# Patient Record
Sex: FEMALE | Race: WHITE | NOT HISPANIC OR LATINO | Employment: UNEMPLOYED | URBAN - METROPOLITAN AREA
[De-identification: names, ages, dates, MRNs, and addresses within clinical notes are randomized per-mention and may not be internally consistent; named-entity substitution may affect disease eponyms.]

---

## 2017-01-30 DIAGNOSIS — E66.09 NON MORBID OBESITY DUE TO EXCESS CALORIES: Primary | ICD-10-CM

## 2017-01-30 RX ORDER — PHENTERMINE HYDROCHLORIDE 37.5 MG/1
37.5 CAPSULE ORAL EVERY MORNING
Qty: 30 CAPSULE | Refills: 0 | Status: SHIPPED | OUTPATIENT
Start: 2017-01-30 | End: 2017-11-06

## 2017-01-30 NOTE — TELEPHONE ENCOUNTER
Phentermine     Last Written Prescription Date:  12-30-16  Last Fill Quantity: 30,   # refills: 0  Last Office Visit with [facility], [facility] or  Health prescribing provider: 12-30-16  Future Office visit:       Routing refill request to provider for review/approval because:  Drug not on the [facility], [facility] or  [facility] refill protocol or controlled substance

## 2017-01-30 NOTE — TELEPHONE ENCOUNTER
Per my note on 12/30/16, ok for refills x1. This is the first refill    Rx signed and placed in MA task.     [facility]

## 2017-02-08 ENCOUNTER — OFFICE VISIT (OUTPATIENT)
Dept: URGENT CARE | Facility: RETAIL CLINIC | Age: 42
End: 2017-02-08
Payer: COMMERCIAL

## 2017-02-08 VITALS
TEMPERATURE: 97.8 F | SYSTOLIC BLOOD PRESSURE: 126 MMHG | WEIGHT: 232 LBS | BODY MASS INDEX: 36.33 KG/M2 | DIASTOLIC BLOOD PRESSURE: 89 MMHG | HEART RATE: 88 BPM

## 2017-02-08 DIAGNOSIS — J06.9 ACUTE URI: ICD-10-CM

## 2017-02-08 DIAGNOSIS — J02.9 ACUTE PHARYNGITIS, UNSPECIFIED ETIOLOGY: Primary | ICD-10-CM

## 2017-02-08 LAB — S PYO AG THROAT QL IA.RAPID: NEGATIVE

## 2017-02-08 PROCEDURE — 87081 CULTURE SCREEN ONLY: CPT | Performed by: PHYSICIAN ASSISTANT

## 2017-02-08 PROCEDURE — 87880 STREP A ASSAY W/OPTIC: CPT | Mod: QW | Performed by: PHYSICIAN ASSISTANT

## 2017-02-08 PROCEDURE — 99213 OFFICE O/P EST LOW 20 MIN: CPT | Performed by: PHYSICIAN ASSISTANT

## 2017-02-08 ASSESSMENT — PAIN SCALES - GENERAL: PAINLEVEL: MILD PAIN (3)

## 2017-02-08 NOTE — MR AVS SNAPSHOT
"              After Visit Summary   2/8/2017    Andree Kennedy    MRN: [id number]           Patient Information     Date Of Birth          1975        Visit Information        Provider Department      2/8/2017 1:10 PM Flor Romero PA-C [facility]        Today's Diagnoses     Acute pharyngitis, unspecified etiology    -  1       Care Instructions    For sore throat  Rapid strep test today is negative.   Your throat culture is pending. [facility] will call you if positive results to start antibiotics at that time.  No phone call if strep culture is negative  Symptomatic treat with fluids, rest, salt water gargles, lozenges, and over the counter pain reliever, such as tylenol or ibuprofen as needed.   Please follow up with primary care provider if not improving, worsening or new symptoms     For ear and nasal congestion  Sudafed (decongestant) for congestion.  Apply warm compresses/packs for 15 minutes daily.  Steam treatments or humidifier.  Tylenol or ibuprofen as needed for pain or fever  Please follow up with primary care provider if not improving, worsening or new symptoms         Follow-ups after your visit        Who to contact     You can reach your care team any time of the day by calling [phone].  Notification of test results:  If you have an abnormal lab result, we will notify you by phone as soon as possible.         Additional Information About Your Visit        MyChart Information     A[employer]ek lets you send messages to your doctor, view your test results, renew your prescriptions, schedule appointments and more. To sign up, go to [email or web address] . Click on \"Log in\" on the left side of the screen, which will take you to the Welcome page. Then click on \"Sign up Now\" on the right side of the page.     You will be asked to enter the access code listed below, as well as some personal information. Please follow the directions to create your username and " password.     Your access code is: [email or web address]  Expires: 3/8/2017  1:34 PM     Your access code will  in 90 days. If you need help or a new code, please call your [place] clinic or [phone].        Care EveryWhere ID     This is your Care EveryWhere ID. This could be used by other organizations to access your [place] medical records  [id number]        Your Vitals Were     Pulse Temperature                88 97.8  F (36.6  C) (Temporal)           Blood Pressure from Last 3 Encounters:   17 126/89   16 122/76   16 141/100    Weight from Last 3 Encounters:   17 232 lb (105.235 kg)   16 224 lb (101.606 kg)   16 226 lb (102.513 kg)              We Performed the Following     BETA STREP GROUP A R/O CULTURE     RAPID STREP SCREEN        Primary Care Provider Office Phone # Fax #    Letty GILBERT Oneill PA-C [phone]       [facility] [address]  [facility] [address]        Thank you!     Thank you for choosing [facility]  for your care. Our goal is always to provide you with excellent care. Hearing back from our patients is one way we can continue to improve our services. Please take a few minutes to complete the written survey that you may receive in the mail after your visit with us. Thank you!             Your Updated Medication List - Protect others around you: Learn how to safely use, store and throw away your medicines at www.disposemymeds.org.          This list is accurate as of: 17  1:53 PM.  Always use your most recent med list.                   Brand Name Dispense Instructions for use    LATUDA PO      Take 40 mg by mouth daily       phentermine 37.5 MG capsule     30 capsule    Take 1 capsule (37.5 mg) by mouth every morning       XANAX PO      Take by mouth every 4 hours as needed for anxiety       ZOLOFT PO      Take 100 mg by mouth daily

## 2017-02-08 NOTE — PROGRESS NOTES
Chief Complaint   Patient presents with     Pharyngitis     for 3 days, feels tired     Ear Problem     left      SUBJECTIVE:  Andree Kennedy is a 41 year old female with a chief complaint of sore throat.  Onset of symptoms was 2 day(s) ago.    Course of illness: still present.  Severity moderate  Current and Associated symptoms: sore throat, ear pressure, tired, sinus pressure  Treatment measures tried include None tried.  Predisposing factors include had strep as a child    Past Medical History   Diagnosis Date     Papanicolaou smear of cervix with low grade squamous intraepithelial lesion (LGSIL) 11/3/15, 11/4/16     + HR HPV     Anxiety      Depressive disorder      Bipolar 1 disorder (H)      Current Outpatient Prescriptions   Medication Sig Dispense Refill     phentermine 37.5 MG capsule Take 1 capsule (37.5 mg) by mouth every morning 30 capsule 0     Lurasidone HCl (LATUDA PO) Take 40 mg by mouth daily        Sertraline HCl (ZOLOFT PO) Take 100 mg by mouth daily        ALPRAZolam (XANAX PO) Take by mouth every 4 hours as needed for anxiety        No Known Allergies     History   Smoking status     Former Smoker     Types: Cigarettes   Smokeless tobacco     Never Used       ROS:  Review of systems negative except as stated above.    OBJECTIVE:   /89 mmHg  Pulse 88  Temp(Src) 97.8  F (36.6  C) (Temporal)  Wt 232 lb (105.235 kg)  GENERAL APPEARANCE: healthy, alert and no distress  EYES: conjunctiva clear  HENT: ear canals clear. R TM gray. L TM gray with serous effusion. Nose boggy, pink.  Pharynx erythematous with no exudate noted.  NECK: supple, tender to palpation, small adenopathy noted  RESP: lungs clear to auscultation - no rales, rhonchi or wheezes  CV: regular rates and rhythm, normal S1 S2, no murmur noted  SKIN: no suspicious lesions or rashes    Rapid Strep test is negative; await throat culture results.    ASSESSMENT:     Acute pharyngitis, unspecified etiology  Acute URI    PLAN:   Rapid  strep test today is negative.   Your throat culture is pending. [facility] will call you if positive results to start antibiotics at that time.  No phone call if strep culture is negative  Symptomatic treat with fluids, rest, salt water gargles, lozenges, and over the counter pain reliever, such as tylenol or ibuprofen as needed.   Decongestant and warm compresses  Please follow up with primary care provider if not improving, worsening or new symptoms    Flor Romero PA-C  [facility]

## 2017-02-09 ENCOUNTER — OFFICE VISIT (OUTPATIENT)
Dept: OBGYN | Facility: OTHER | Age: 42
End: 2017-02-09
Payer: COMMERCIAL

## 2017-02-09 VITALS
HEART RATE: 72 BPM | SYSTOLIC BLOOD PRESSURE: 132 MMHG | BODY MASS INDEX: 36.21 KG/M2 | WEIGHT: 231.25 LBS | DIASTOLIC BLOOD PRESSURE: 84 MMHG

## 2017-02-09 DIAGNOSIS — N89.8 VAGINAL ODOR: Primary | ICD-10-CM

## 2017-02-09 LAB
MICRO REPORT STATUS: ABNORMAL
SPECIMEN SOURCE: ABNORMAL
WET PREP SPEC: ABNORMAL

## 2017-02-09 PROCEDURE — 86780 TREPONEMA PALLIDUM: CPT | Performed by: ADVANCED PRACTICE MIDWIFE

## 2017-02-09 PROCEDURE — 87389 HIV-1 AG W/HIV-1&-2 AB AG IA: CPT | Performed by: ADVANCED PRACTICE MIDWIFE

## 2017-02-09 PROCEDURE — 87591 N.GONORRHOEAE DNA AMP PROB: CPT | Performed by: ADVANCED PRACTICE MIDWIFE

## 2017-02-09 PROCEDURE — 87210 SMEAR WET MOUNT SALINE/INK: CPT | Performed by: ADVANCED PRACTICE MIDWIFE

## 2017-02-09 PROCEDURE — 87491 CHLMYD TRACH DNA AMP PROBE: CPT | Performed by: ADVANCED PRACTICE MIDWIFE

## 2017-02-09 PROCEDURE — 99213 OFFICE O/P EST LOW 20 MIN: CPT | Performed by: ADVANCED PRACTICE MIDWIFE

## 2017-02-09 PROCEDURE — 36415 COLL VENOUS BLD VENIPUNCTURE: CPT | Performed by: ADVANCED PRACTICE MIDWIFE

## 2017-02-09 RX ORDER — METRONIDAZOLE 7.5 MG/G
1 GEL VAGINAL AT BEDTIME
Qty: 70 G | Refills: 3 | Status: SHIPPED | OUTPATIENT
Start: 2017-02-09 | End: 2017-02-14

## 2017-02-09 NOTE — PROGRESS NOTES
"Andree Kennedy is a 41 year old who presents to the clinic for evaluation of vaginal odor and burning.   Feels that she has had probably 3 vaginal infections in the last 4-5 months.  She is also concerned about STI.   Reports that she and her  \"took a break\" a while ago and would like to be screened.      Histories reviewed and updated  Past Medical History   Diagnosis Date     Papanicolaou smear of cervix with low grade squamous intraepithelial lesion (LGSIL) 11/3/15, 11/4/16     + HR HPV     Anxiety      Depressive disorder      Bipolar 1 disorder (H)      Past Surgical History   Procedure Laterality Date     No history of surgery       Social History     Social History     Marital Status: Single     Spouse Name: N/A     Number of Children: N/A     Years of Education: N/A     Occupational History           customer service     Social History Main Topics     Smoking status: Former Smoker     Types: Cigarettes     Smokeless tobacco: Never Used     Alcohol Use: Yes      Comment: social     Drug Use: No     Sexual Activity:     Partners: Male     Birth Control/ Protection: None     Other Topics Concern     Parent/Sibling W/ Cabg, Mi Or Angioplasty Before 65f 55m? No     Social History Narrative     Family History   Problem Relation Age of Onset     Cancer - colorectal Mother      polyp removal     DIABETES Father      CANCER Father      Skin     Hypertension Father      HEART DISEASE Father      Unknown/Adopted Maternal Grandmother      Unknown/Adopted Maternal Grandfather      Unknown/Adopted Paternal Grandmother      Unknown/Adopted Paternal Grandfather            CONSTITUTIONAL: Healthy, alert, a bit weepy on and off through the visit  : vaginal burning on and off may be related to intercourse.   PSYCHIATRIC: has a therapist and psychiatist    EXAM:  /84 mmHg  Pulse 72  Wt 231 lb 4 oz (104.894 kg)  LMP 02/04/2017 (Approximate)    : PELVIC EXAM:  Vulva: No external lesions, normal hair " distribution, no adenopathy, BUS WNL  Vagina: Moist, pink, no abnormal discharge, well rugated, no lesions.  Ph is >4.5  Odor is noted.   Cervix: smooth, pink, no visible lesions, Rectal exam: deferred    Wet prep with clue    ASSESSMENT/PLAN:    (N89.8) Vaginal odor  (primary encounter diagnosis)  Comment:   Plan: Wet prep, Anti Treponema, Chlamydia trachomatis        PCR, Neisseria gonorrhoeae PCR, HIV Antigen         Antibody Combo            Will plan to place on suppression.    Use metrogel x 5 nights then once a week for 3-6 months.   Will contact me if this fails or she has other issues.

## 2017-02-09 NOTE — NURSING NOTE
"Chief Complaint   Patient presents with     Vaginal Problem     check for vaginal infection       Initial /84 mmHg  Pulse 72  Wt 231 lb 4 oz (104.894 kg)  LMP 2017 (Approximate) Estimated body mass index is 36.21 kg/(m^2) as calculated from the following:    Height as of 16: 5' 7.01\" (1.702 m).    Weight as of this encounter: 231 lb 4 oz (104.894 kg).  BP completed using cuff size: large        The following HM Due: NONE      The following patient reported/Care Every where data was sent to:  P ABSTRACT QUALITY INITIATIVES [49779]       patient has appointment for today  [address] CMA                 "

## 2017-02-09 NOTE — MR AVS SNAPSHOT
After Visit Summary   2/9/2017    Andree Kennedy    MRN: [id number]           Patient Information     Date Of Birth          1975        Visit Information        Provider Department      2/9/2017 12:30 PM Arelis Melgar APRN [facility]        Today's Diagnoses     Vaginal odor    -  1       Care Instructions                          Bacterial Vaginosis (Vaginal Infection)  Information About Your Condition:  Description  Bacterial vaginosis (BV) is a common infection of the vagina caused by bacteria. Bacterial vaginosis needs to be treated because it increases your risk of becoming infected with HIV if you are exposed to the virus. If you also have a sexually transmitted infection, such as chlamydia, the risk that the infection will spread into the uterus is higher when you also have BV. The symptoms usually go away within a few days after you start treatment.   Symptoms  Many women do not have any symptoms. If symptoms are present, they may include one or more of the following:  a fishy smelling, gray or yellowish discharge from the vagina, especially after sexual intercourse   itching or irritation around the opening of the vagina   pain during urination   Causes  Bacterial vaginosis appears to be caused by an overgrowth of several types of bacteria. It is normal to have these bacteria in the vagina. However, when something upsets the balance between normal and harmful bacteria in the vagina, it can cause unpleasant symptoms.   It is not known what causes the overgrowth of harmful bacteria. Most cases of BV occur in sexually active women. Women who have more than one sexual partner or who have a woman as a partner have a greater risk of developing the problem. However, women who are not sexually active can also have BV.   Douching or using an IUD (intrauterine device) for birth control may increase your risk.   What You Should Do At Home (Follow-up Care)   If you were  given a prescription be sure to get it filled right away. Follow the directions exactly. Take the medicine until it is completely gone. Do not stop taking it just because you feel better. If you do not think it is helping, call your healthcare provider. Do not increase how much you take or how often you take it without talking to your healthcare provider first.   If there is a possibility that you may be pregnant, tell your healthcare provider. Do NOT take metronidazole (Flagyl  or MetroGel ). It should not be used during the first 3 months of pregnancy. It can be used AFTER the first 3 months of pregnancy if it is clearly needed.   If you are taking metronidazole (Flagyl  or MetroGel ), do not drink any alcohol until 2 days after you finish the medicine. Drinking alcohol while you are taking Flagyl  may cause severe nausea and vomiting.   If you have sexual intercourse while you are taking the medicine, make sure a latex or polyurethane condom is used so you do not become reinfected.   Please keep all medicines out of the reach of children.   What You Can Do To Stay Healthy   Don't have sex.   If you have sexual intercourse, have only one partner who has no other partners.   Do not douche.   Protect yourself with a condom every time you have vaginal, anal, or oral sex.   Care Alerts  Call Your Healthcare Provider Right Away If:  You develop new or worsening pain.   You have new abnormal vaginal bleeding   Your symptoms don t improve or they get worse, or new symptoms develop.   Your symptoms last more than 1 week.   You have symptoms that worry you.           Follow-ups after your visit        Who to contact     If you have questions or need follow up information about today's clinic visit or your schedule please contact [facility] directly at [phone].  Normal or non-critical lab and imaging results will be communicated to you by MyChart, letter or phone within 4 business days after the clinic  "has received the results. If you do not hear from us within 7 days, please contact the clinic through Guanri or phone. If you have a critical or abnormal lab result, we will notify you by phone as soon as possible.  Submit refill requests through Guanri or call your pharmacy and they will forward the refill request to us. Please allow 3 business days for your refill to be completed.          Additional Information About Your Visit        ProviationharMobileHelp Information     Guanri lets you send messages to your doctor, view your test results, renew your prescriptions, schedule appointments and more. To sign up, go to [email or web address] . Click on \"Log in\" on the left side of the screen, which will take you to the Welcome page. Then click on \"Sign up Now\" on the right side of the page.     You will be asked to enter the access code listed below, as well as some personal information. Please follow the directions to create your username and password.     Your access code is: [email or web address]  Expires: 3/8/2017  1:34 PM     Your access code will  in 90 days. If you need help or a new code, please call your [place] clinic or [phone].        Care EveryWhere ID     This is your Care EveryWhere ID. This could be used by other organizations to access your [place] medical records  [id number]        Your Vitals Were     Pulse Last Period                72 2017 (Approximate)           Blood Pressure from Last 3 Encounters:   17 132/84   17 126/89   16 122/76    Weight from Last 3 Encounters:   17 231 lb 4 oz (104.894 kg)   17 232 lb (105.235 kg)   16 224 lb (101.606 kg)              We Performed the Following     Anti Treponema     Chlamydia trachomatis PCR     HIV Antigen Antibody Combo     Neisseria gonorrhoeae PCR     Wet prep          Today's Medication Changes          These changes are accurate as of: 17  1:07 PM.  If you have any questions, ask your nurse or doctor.    "            Start taking these medicines.        Dose/Directions    metroNIDAZOLE 0.75 % vaginal gel   Commonly known as:  METROGEL   Used for:  Vaginal odor   Started by:  Arelis Melgar APRN CNM        Dose:  1 applicator   Place 1 applicator vaginally At Bedtime for 5 days   Quantity:  70 g   Refills:  3            Where to get your medicines      These medications were sent to [facility] [address] [address] [address]  [address] [facility] [address]     Phone:  [phone]    - metroNIDAZOLE 0.75 % vaginal gel             Primary Care Provider Office Phone # Fax #    Letty GILBERT Oneill PA-C [phone]       [facility] [address]  [facility] [address]        Thank you!     Thank you for choosing [facility]  for your care. Our goal is always to provide you with excellent care. Hearing back from our patients is one way we can continue to improve our services. Please take a few minutes to complete the written survey that you may receive in the mail after your visit with us. Thank you!             Your Updated Medication List - Protect others around you: Learn how to safely use, store and throw away your medicines at www.disposemymeds.org.          This list is accurate as of: 2/9/17  1:07 PM.  Always use your most recent med list.                   Brand Name Dispense Instructions for use    LATUDA PO      Take 40 mg by mouth daily       metroNIDAZOLE 0.75 % vaginal gel    METROGEL    70 g    Place 1 applicator vaginally At Bedtime for 5 days       phentermine 37.5 MG capsule     30 capsule    Take 1 capsule (37.5 mg) by mouth every morning       XANAX PO      Take by mouth every 4 hours as needed for anxiety       ZOLOFT PO      Take 100 mg by mouth daily

## 2017-02-10 LAB
BETA STREP CONFIRM: NORMAL
C TRACH DNA SPEC QL NAA+PROBE: NORMAL
HIV 1+2 AB+HIV1 P24 AG SERPL QL IA: NORMAL
N GONORRHOEA DNA SPEC QL NAA+PROBE: NORMAL
SPECIMEN SOURCE: NORMAL
SPECIMEN SOURCE: NORMAL
T PALLIDUM IGG+IGM SER QL: NEGATIVE

## 2017-09-29 ENCOUNTER — ALLIED HEALTH/NURSE VISIT (OUTPATIENT)
Dept: FAMILY MEDICINE | Facility: OTHER | Age: 42
End: 2017-09-29
Payer: COMMERCIAL

## 2017-09-29 DIAGNOSIS — Z23 NEED FOR PROPHYLACTIC VACCINATION AND INOCULATION AGAINST INFLUENZA: Primary | ICD-10-CM

## 2017-09-29 PROCEDURE — 90686 IIV4 VACC NO PRSV 0.5 ML IM: CPT

## 2017-09-29 PROCEDURE — 99207 ZZC NO CHARGE NURSE ONLY: CPT

## 2017-09-29 PROCEDURE — 90471 IMMUNIZATION ADMIN: CPT

## 2017-09-29 NOTE — MR AVS SNAPSHOT
"              After Visit Summary   9/29/2017    Andree Kennedy    MRN: [id number]           Patient Information     Date Of Birth          1975        Visit Information        Provider Department      9/29/2017 4:00 PM NL FLU SHOT ERC [facility]        Today's Diagnoses     Need for prophylactic vaccination and inoculation against influenza    -  1       Follow-ups after your visit        Your next 10 appointments already scheduled     Sep 29, 2017  4:00 PM CDT   Nurse Only with NL FLU SHOT ERC   [facility]    [address]  [facility] [id number] [phone]              Who to contact     If you have questions or need follow up information about today's clinic visit or your schedule please contact [facility] directly at [phone].  Normal or non-critical lab and imaging results will be communicated to you by BurudaConcerthart, letter or phone within 4 business days after the clinic has received the results. If you do not hear from us within 7 days, please contact the clinic through MyChart or phone. If you have a critical or abnormal lab result, we will notify you by phone as soon as possible.  Submit refill requests through [employer] or call your pharmacy and they will forward the refill request to us. Please allow 3 business days for your refill to be completed.          Additional Information About Your Visit        MyChart Information     [employer] lets you send messages to your doctor, view your test results, renew your prescriptions, schedule appointments and more. To sign up, go to [email or web address] . Click on \"Log in\" on the left side of the screen, which will take you to the Welcome page. Then click on \"Sign up Now\" on the right side of the page.     You will be asked to enter the access code listed below, as well as some personal information. Please follow the directions to create your username and password.   "   Your access code is: [email or web address]  Expires: 2017  2:41 PM     Your access code will  in 90 days. If you need help or a new code, please call your [place] clinic or [phone].        Care EveryWhere ID     This is your Care EveryWhere ID. This could be used by other organizations to access your [place] medical records  [id number]         Blood Pressure from Last 3 Encounters:   17 132/84   17 126/89   16 122/76    Weight from Last 3 Encounters:   17 231 lb 4 oz (104.9 kg)   17 232 lb (105.2 kg)   16 224 lb (101.6 kg)              We Performed the Following     FLU VAC, SPLIT VIRUS IM > 3 YO (QUADRIVALENT) [88625]     Vaccine Administration, Initial [73849]        Primary Care Provider Office Phone # Fax #    Letty GILBERT Oneill PA-C [phone]       [address] [place] [address] [address]  [facility] [address]        Equal Access to Services     [facility]: Hadii aad ku hadasho Soomaali, waaxda luqadaha, qaybta kaalmada shawna, ana avila . So [facility] [phone].    ATENCIÓN: Si habla español, tiene a rivers disposición servicios gratuitos de asistencia lingüística. [facility] [phone].    We comply with applicable federal civil rights laws and [place] laws. We do not discriminate on the basis of race, color, national origin, age, disability, sex, sexual orientation, or gender identity.            Thank you!     Thank you for choosing [facility]  for your care. Our goal is always to provide you with excellent care. Hearing back from our patients is one way we can continue to improve our services. Please take a few minutes to complete the written survey that you may receive in the mail after your visit with us. Thank you!             Your Updated Medication List - Protect others around you: Learn how to safely use, store and throw away your medicines at www.disposemymeds.org.          This list is accurate  as of: 9/29/17  2:41 PM.  Always use your most recent med list.                   Brand Name Dispense Instructions for use Diagnosis    LATUDA PO      Take 40 mg by mouth daily        phentermine 37.5 MG capsule     30 capsule    Take 1 capsule (37.5 mg) by mouth every morning    Non morbid obesity due to excess calories       XANAX PO      Take by mouth every 4 hours as needed for anxiety        ZOLOFT PO      Take 100 mg by mouth daily

## 2017-09-29 NOTE — PROGRESS NOTES
Injectable Influenza Immunization Documentation    1.  Is the person to be vaccinated sick today?   No    2. Does the person to be vaccinated have an allergy to a component   of the vaccine?   No    3. Has the person to be vaccinated ever had a serious reaction   to influenza vaccine in the past?   No    4. Has the person to be vaccinated ever had Guillain-Barré syndrome?   No    Form completed by Andree Kennedy/Vandana Dumont, [facility]

## 2017-11-02 NOTE — PROGRESS NOTES
SUBJECTIVE:                                                    Andree Kennedy is a 42 year old female who presents to clinic today for the following health issues:    Pt states also having sore throat sx's and would like a throat swab  Throat pain started Friday, fatigue, feels like something is stuck in throat, stuffy  Acute Illness   Acute illness concerns: As above   Onset: Friday     Fever: no    Chills/Sweats: no    Headache (location?): no    Sinus Pressure:YES    Conjunctivitis:  no    Ear Pain: no    Rhinorrhea: no    Congestion: YES    Sore Throat: YES     Cough: no    Wheeze: no    Decreased Appetite: no    Nausea: no    Vomiting: no    Diarrhea:  no    Dysuria/Freq.: no    Fatigue/Achiness: YES    Sick/Strep Exposure: no     Therapies Tried and outcome: Increased fluids       History of Present Illness     Depression & Anxiety Follow-up:     Depression/Anxiety:  Depression & Anxiety    Status since last visit::  Worsened    Other associated symptoms of depression and anxiety::  None    Significant life event::  No    Current substance use::  None    Diet:  Other  Frequency of exercise:  2-3 days/week  Duration of exercise:  45-60 minutes  Taking medications regularly:  Yes  Medication side effects:  None  Additional concerns today:  No    - This time of year worsens, has seasonal affective   - Latuda 40 mg and Sertraline 100 mg, Xanax PRN - 2-3x/week   - Therapy every week, working on coping mechanisms   - Denies SI     PHQ-9 SCORE 9/12/2013 6/1/2016 11/6/2017   Total Score 10 - -   Total Score MyChart - - 25 (Severe depression)   Total Score - 25 25     MICA-7 SCORE 9/12/2013 6/1/2016   Total Score 11 -   Total Score - 21       Medication Followup of Phentermine    Taking Medication as prescribed: not taking currently    Side Effects:  None    Medication Helping Symptoms:  Yes    - Wondering about a medication called contrave   - Also wondering about bariatric surgery referral  - Phentermine helps but  "always comes back slowly after stopping   - Tried many diets and other medications in the past        Hypertension Follow-up      Outpatient blood pressures are not being checked.    Low Salt Diet: not monitoring salt      Answers for HPI/ROS submitted by the patient on 11/6/2017   PHQ-2 Score: 6  If you checked off any problems, how difficult have these problems made it for you to do your work, take care of things at home, or get along with other people?: Extremely difficult  PHQ9 TOTAL SCORE: 25        Plan from last visit 12/30/16  1.  - Restart Phentermine, reviewed use and side effects   - Can do for total of 12 weeks, ok for refills x2   - Continue with 1,500 calorie diet and get back on exercise      2.   - Will get wet prep today   - Results will be communicated to patient when available and appropriate medication changes made if necessary       The 10-year ASCVD risk score (Sita TILLMAN Jr, et al., 2013) is: 1.7%    Values used to calculate the score:      Age: 42 years      Sex: Female      Is Non- : No      Diabetic: No      Tobacco smoker: No      Systolic Blood Pressure: 128 mmHg      Is BP treated: No      HDL Cholesterol: 36 mg/dL      Total Cholesterol: 224 mg/dL      Problem list and histories reviewed & adjusted, as indicated.  Additional history: as documented    BP Readings from Last 3 Encounters:   11/06/17 (!) 138/96   02/09/17 132/84   02/08/17 126/89    Wt Readings from Last 3 Encounters:   11/06/17 241 lb (109.3 kg)   02/09/17 231 lb 4 oz (104.9 kg)   02/08/17 232 lb (105.2 kg)             Labs reviewed in EPIC      ROS:  Constitutional, HEENT, cardiovascular, pulmonary, gi and gu systems are negative, except as otherwise noted.      OBJECTIVE:   BP (!) 138/96 (BP Location: Right arm, Patient Position: Chair, Cuff Size: Adult Regular)  Pulse 74  Temp 98.1  F (36.7  C) (Oral)  Resp 16  Ht 5' 5.75\" (1.67 m)  Wt 241 lb (109.3 kg)  SpO2 98%  BMI 39.2 kg/m2  Body mass " index is 39.2 kg/(m^2).  GENERAL APPEARANCE: healthy, alert and no distress  EYES: Eyes grossly normal to inspection, PERRLA, conjunctivae and sclerae without injection or discharge, EOM intact   HENT: Bilateral ear canals without erythema or cerumen, bilateral TM's pearly grey with normal light reflex, no effusion, injection, or bulging, nasal turbinates without swelling, erythema, or discharge, mouth without ulcers or lesions, oropharynx mildly erythematous without edema, oral mucous membranes moist, no sinus tenderness   NECK: No adenopathy in cervical, supraclavicular, or axillary regions, no asymmetry, masses, or scars, and thyroid normal to palpation, no JVD   RESP: Lungs clear to auscultation - no rales, rhonchi or wheezes   CV: Regular rates and rhythm, normal S1 S2, no S3 or S4, no murmur, click or rub, no peripheral edema and peripheral pulses strong and symmetric bilaterally    MS: No musculoskeletal defects are noted and gait is age appropriate without ataxia   SKIN: No suspicious lesions or rashes, hydration status appears adeuqate with normal skin turgor   PSYCH: Alert and oriented x3; speech- coherent , normal rate and volume; able to articulate logical thoughts, able to abstract reason, no tangential thoughts, no hallucinations or delusions, mentation appears normal, Mood is euthymic. Affect is appropriate for this mood state and bright. Thought content is free of suicidal ideation, hallucinations, and delusions. Dress is adequate and upkept. Eye contact is good during conversation.         Diagnostic Test Results:  Results for orders placed or performed in visit on 11/06/17   BASIC METABOLIC PANEL   Result Value Ref Range    Sodium 139 133 - 144 mmol/L    Potassium 4.3 3.4 - 5.3 mmol/L    Chloride 107 94 - 109 mmol/L    Carbon Dioxide 26 20 - 32 mmol/L    Anion Gap 6 3 - 14 mmol/L    Glucose 84 70 - 99 mg/dL    Urea Nitrogen 8 7 - 30 mg/dL    Creatinine 0.72 0.52 - 1.04 mg/dL    GFR Estimate 89 >60  mL/min/1.7m2    GFR Estimate If Black >90 >60 mL/min/1.7m2    Calcium 8.5 8.5 - 10.1 mg/dL   Lipid panel reflex to direct LDL Fasting   Result Value Ref Range    Cholesterol 277 (H) <200 mg/dL    Triglycerides 258 (H) <150 mg/dL    HDL Cholesterol 39 (L) >49 mg/dL    LDL Cholesterol Calculated 186 (H) <100 mg/dL    Non HDL Cholesterol 238 (H) <130 mg/dL   TSH with free T4 reflex   Result Value Ref Range    TSH 1.57 0.40 - 4.00 mU/L   Strep, Rapid Screen   Result Value Ref Range    Specimen Description Throat     Rapid Strep A Screen       NEGATIVE: No Group A streptococcal antigen detected by immunoassay, await culture report.         ASSESSMENT/PLAN:       ICD-10-CM    1. HTN, goal below 140/90 I10 BASIC METABOLIC PANEL     BARIATRIC ADULT REFERRAL     TSH with free T4 reflex   2. Hyperlipidemia LDL goal <130 E78.5 Lipid panel reflex to direct LDL Fasting     BARIATRIC ADULT REFERRAL     TSH with free T4 reflex   3. Class 2 obesity due to excess calories without serious comorbidity with body mass index (BMI) of 39.0 to 39.9 in adult E66.09 BARIATRIC ADULT REFERRAL    Z68.39 TSH with free T4 reflex     naltrexone-bupropion (CONTRAVE) 8-90 MG per 12 hr tablet   4. Anxiety state F41.1 naltrexone-bupropion (CONTRAVE) 8-90 MG per 12 hr tablet   5. Mild episode of recurrent major depressive disorder (H) F33.0 naltrexone-bupropion (CONTRAVE) 8-90 MG per 12 hr tablet   6. Bipolar I disorder (H) F31.9    7. Seasonal affective disorder (H) F33.9    8. Throat pain R07.0 Strep, Rapid Screen     Beta strep group A culture   9. Viral URI J06.9     B97.89      1. HTN   - Known diagnosis since 2013, controlled without medication   - Elevated today but did come down with rest   - Will get labs today to update BMP, as well as TSH to rule out reversible causes   - Results will be communicated to patient when available and appropriate medication changes made if necessary   - Given BP log to monitor at pharmacies, return to clinic if  >140/90 consistently   - Hand out on DASH diet given    2. Lipids   - Discussed new diagnosis as of labs from March 2016 and need for yearly rechecks   - Discussed risks of uncontrolled cholesterol   - Will update lab today  - Results will be communicated to patient when available and appropriate medication changes made if necessary   - Discussed ASCVD risk score and decision making for medication     3. Obesity   - Discussed risks of this including impact on her hypertension and hyperlipidemia   - Discussed alternative medications as well as bariatric surgery referral in detail   - Discussed I would prefer at this time to switch to a long term medication instead of short term phentermine   - Will start Contrave, discussed use and side effects including how could help with her mood, also discussed taper plan (see patient instructions)   - Recheck every 2-3 weeks to assist in taper  - Continue with diet and exercise, return to [id number] calorie diet, and increase frequency and intensity of exercise   - Patient to consider bariatric referral, this was given, discussed intensive and takes awhile before surgery happens so would be ok to pursue seeing if she is a candidate early     4-7. Mood   - Discussed bupropion in Contrave will help with mood as well as weight loss   - Recheck every 2-3 weeks   - Patient to monitor mood symptoms for improvements and worsening   - Continue with Latuda & Zoloft, as well as Xanax PRN, as prescribed by counseling/physciatry group  - Continue with weekly counseling     8 & 9. Acute illness   - Discussed no signs of infection on exam, negative rapid strep, will await culture   - Continue with OTC medications for symptomatic treatment as helpful   - Return to clinic if symptoms worsen or persist >10 days     The patient indicates understanding of these issues and agrees with the plan.    Follow up: As outlined above       A total of 50 minutes was spent with the patient today, with greater  than 50% of the visit involving counseling and coordination of care.      Letty Oneill PA-C  [facility]

## 2017-11-03 NOTE — PROGRESS NOTES
SUBJECTIVE:   CC: Andree Kennedy is an 42 year old woman who presents for preventive health visit.     Healthy Habits:    Do you get at least three servings of calcium containing foods daily (dairy, green leafy vegetables, etc.)? yes    Amount of exercise or daily activities, outside of work: 3 day(s) per week    Problems taking medications regularly No    Medication side effects: No    Have you had an eye exam in the past two years? yes    Do you see a dentist twice per year? yes    Do you have sleep apnea, excessive snoring or daytime drowsiness?no        No concerns    Today's PHQ-2 Score:   PHQ-2 ( 1999 [employer]) 11/7/2017 11/6/2017   Q1: Little interest or pleasure in doing things 3 3   Q2: Feeling down, depressed or hopeless 3 3   PHQ-2 Score 6 6   Q1: Little interest or pleasure in doing things - Nearly every day   Q2: Feeling down, depressed or hopeless - Nearly every day   PHQ-2 Score - 6         Abuse: Current or Past(Physical, Sexual or Emotional)- No  Do you feel safe in your environment - Yes  Social History   Substance Use Topics     Smoking status: Former Smoker     Types: Cigarettes     Smokeless tobacco: Never Used     Alcohol use Yes      Comment: social     The patient does not drink >3 drinks per day nor >7 drinks per week.    Reviewed orders with patient.  Reviewed health maintenance and updated orders accordingly - Yes  BP Readings from Last 3 Encounters:   11/07/17 132/80   11/06/17 128/88   02/09/17 132/84    Wt Readings from Last 3 Encounters:   11/07/17 242 lb 8 oz (110 kg)   11/06/17 241 lb (109.3 kg)   02/09/17 231 lb 4 oz (104.9 kg)                  Patient Active Problem List   Diagnosis     Anxiety state     Headache, post-traumatic     Neck pain     Headaches, tension     Cervical radiculopathy     Neural foraminal stenosis of cervical spine     Cervical spondylosis     Postpartum hypertension     HTN, goal below 140/90     Obesity     Papanicolaou smear of cervix with low grade  squamous intraepithelial lesion (LGSIL)     Hyperlipidemia LDL goal <130     Mild episode of recurrent major depressive disorder (H)     Bipolar I disorder (H)     Seasonal affective disorder (H)     Past Surgical History:   Procedure Laterality Date     NO HISTORY OF SURGERY         Social History   Substance Use Topics     Smoking status: Former Smoker     Types: Cigarettes     Smokeless tobacco: Never Used     Alcohol use Yes      Comment: social     Family History   Problem Relation Age of Onset     Cancer - colorectal Mother      polyp removal     DIABETES Father      CANCER Father      Skin     Hypertension Father      HEART DISEASE Father      Unknown/Adopted Maternal Grandmother      Unknown/Adopted Maternal Grandfather      Unknown/Adopted Paternal Grandmother      Unknown/Adopted Paternal Grandfather          Current Outpatient Prescriptions   Medication Sig Dispense Refill     Lurasidone HCl (LATUDA PO) Take 40 mg by mouth daily        Sertraline HCl (ZOLOFT PO) Take 100 mg by mouth daily        ALPRAZolam (XANAX PO) Take by mouth every 4 hours as needed for anxiety       naltrexone-bupropion (CONTRAVE) 8-90 MG per 12 hr tablet Take 1 tablet in the morning for 1 week, then increase to 1 tablet in morning and 1 in afternoon. (Patient not taking: Reported on 11/7/2017) 60 tablet 0           Patient under age 50, mutual decision reflected in health maintenance.        Pertinent mammograms are reviewed under the imaging tab.  History of abnormal Pap smear:   Last 3 Pap Results:   PAP (no units)   Date Value   11/04/2016 LSIL (A)   11/03/2015 LSIL (A)       Reviewed and updated as needed this visit by clinical staff         Reviewed and updated as needed this visit by Provider            ROS:  C: NEGATIVE for fever, chills, change in weight  I: NEGATIVE for worrisome rashes, moles or lesions  E: NEGATIVE for vision changes or irritation  ENT: NEGATIVE for ear, mouth and throat problems  R: NEGATIVE for  significant cough or SOB  B: NEGATIVE for masses, tenderness or discharge  CV: NEGATIVE for chest pain, palpitations or peripheral edema  GI: NEGATIVE for nausea, abdominal pain, heartburn, or change in bowel habits  : NEGATIVE for unusual urinary or vaginal symptoms. Periods are regular.  M: NEGATIVE for significant arthralgias or myalgia  N: NEGATIVE for weakness, dizziness or paresthesias  E: NEGATIVE for temperature intolerance, skin/hair changes  P: NEGATIVE for changes in mood or affect    OBJECTIVE:   There were no vitals taken for this visit.  EXAM:  GENERAL: healthy, alert and no distress  EYES: Eyes grossly normal to inspection, PERRL and conjunctivae and sclerae normal  HENT: ear canals and TM's normal, nose and mouth without ulcers or lesions  NECK: no adenopathy, no asymmetry, masses, or scars and thyroid normal to palpation  RESP: lungs clear to auscultation - no rales, rhonchi or wheezes  BREAST: normal without masses, tenderness or nipple discharge and no palpable axillary masses or adenopathy  CV: regular rate and rhythm, normal S1 S2, no S3 or S4, no murmur, click or rub, no peripheral edema and peripheral pulses strong  ABDOMEN: soft, nontender, no hepatosplenomegaly, no masses and bowel sounds normal   (female): normal female external genitalia, normal urethral meatus, vaginal mucosa pink, moist, well rugated, and normal cervix without masses or discharge  MS: no gross musculoskeletal defects noted, no edema  SKIN: no suspicious lesions or rashes  NEURO: Normal strength and tone, mentation intact and speech normal  PSYCH: mentation appears normal, affect normal/bright    ASSESSMENT/PLAN:   (Z01.419) Encounter for well woman exam with routine gynecological exam  (primary encounter diagnosis)  Comment:   Plan:     (N89.8) Vaginal odor  Comment:   Plan: Wet prep            (R87.612) Papanicolaou smear of cervix with low grade squamous intraepithelial lesion (LGSIL)  Comment:   Plan: Pap imaged  "thin layer diagnostic with HPV         (select HPV order below), HPV High Risk Types         DNA Cervical            (L29.8) Vaginal itching  Comment:   Plan: terconazole (TERAZOL 3) 0.8 % cream              COUNSELING:   Reviewed preventive health counseling, as reflected in patient instructions       Regular exercise       Healthy diet/nutrition       Contraception       Family planning    Continues to see therapist on a weekly basis     reports that she has quit smoking. Her smoking use included Cigarettes. She has never used smokeless tobacco.    Estimated body mass index is 36.21 kg/(m^2) as calculated from the following:    Height as of 12/8/16: 5' 7.01\" (1.702 m).    Weight as of 2/9/17: 231 lb 4 oz (104.9 kg).   Weight management plan: consdering bariatric surgery    Counseling Resources:  ATP IV Guidelines  Pooled Cohorts Equation Calculator  Breast Cancer Risk Calculator  FRAX Risk Assessment  ICSI Preventive Guidelines  Dietary Guidelines for Americans, 2010  USDA's MyPlate  ASA Prophylaxis  Lung CA Screening    Day FRAN Ochoa [facility]  "

## 2017-11-06 ENCOUNTER — TELEPHONE (OUTPATIENT)
Dept: FAMILY MEDICINE | Facility: OTHER | Age: 42
End: 2017-11-06

## 2017-11-06 ENCOUNTER — OFFICE VISIT (OUTPATIENT)
Dept: FAMILY MEDICINE | Facility: OTHER | Age: 42
End: 2017-11-06
Payer: MEDICARE

## 2017-11-06 VITALS
SYSTOLIC BLOOD PRESSURE: 128 MMHG | HEIGHT: 66 IN | WEIGHT: 241 LBS | TEMPERATURE: 98.1 F | OXYGEN SATURATION: 98 % | HEART RATE: 74 BPM | BODY MASS INDEX: 38.73 KG/M2 | RESPIRATION RATE: 16 BRPM | DIASTOLIC BLOOD PRESSURE: 88 MMHG

## 2017-11-06 DIAGNOSIS — F33.8 SEASONAL AFFECTIVE DISORDER (H): ICD-10-CM

## 2017-11-06 DIAGNOSIS — J06.9 VIRAL URI: ICD-10-CM

## 2017-11-06 DIAGNOSIS — E66.09 CLASS 2 OBESITY DUE TO EXCESS CALORIES WITHOUT SERIOUS COMORBIDITY WITH BODY MASS INDEX (BMI) OF 39.0 TO 39.9 IN ADULT: Primary | ICD-10-CM

## 2017-11-06 DIAGNOSIS — F31.9 BIPOLAR I DISORDER (H): ICD-10-CM

## 2017-11-06 DIAGNOSIS — F41.1 ANXIETY STATE: ICD-10-CM

## 2017-11-06 DIAGNOSIS — E66.09 CLASS 2 OBESITY DUE TO EXCESS CALORIES WITHOUT SERIOUS COMORBIDITY WITH BODY MASS INDEX (BMI) OF 39.0 TO 39.9 IN ADULT: ICD-10-CM

## 2017-11-06 DIAGNOSIS — F33.0 MILD EPISODE OF RECURRENT MAJOR DEPRESSIVE DISORDER (H): ICD-10-CM

## 2017-11-06 DIAGNOSIS — E66.812 CLASS 2 OBESITY DUE TO EXCESS CALORIES WITHOUT SERIOUS COMORBIDITY WITH BODY MASS INDEX (BMI) OF 39.0 TO 39.9 IN ADULT: ICD-10-CM

## 2017-11-06 DIAGNOSIS — I10 HTN, GOAL BELOW 140/90: Primary | ICD-10-CM

## 2017-11-06 DIAGNOSIS — E66.812 CLASS 2 OBESITY DUE TO EXCESS CALORIES WITHOUT SERIOUS COMORBIDITY WITH BODY MASS INDEX (BMI) OF 39.0 TO 39.9 IN ADULT: Primary | ICD-10-CM

## 2017-11-06 DIAGNOSIS — E78.5 HYPERLIPIDEMIA LDL GOAL <130: ICD-10-CM

## 2017-11-06 DIAGNOSIS — R07.0 THROAT PAIN: ICD-10-CM

## 2017-11-06 LAB
ANION GAP SERPL CALCULATED.3IONS-SCNC: 6 MMOL/L (ref 3–14)
BUN SERPL-MCNC: 8 MG/DL (ref 7–30)
CALCIUM SERPL-MCNC: 8.5 MG/DL (ref 8.5–10.1)
CHLORIDE SERPL-SCNC: 107 MMOL/L (ref 94–109)
CHOLEST SERPL-MCNC: 277 MG/DL
CO2 SERPL-SCNC: 26 MMOL/L (ref 20–32)
CREAT SERPL-MCNC: 0.72 MG/DL (ref 0.52–1.04)
DEPRECATED S PYO AG THROAT QL EIA: NORMAL
GFR SERPL CREATININE-BSD FRML MDRD: 89 ML/MIN/1.7M2
GLUCOSE SERPL-MCNC: 84 MG/DL (ref 70–99)
HDLC SERPL-MCNC: 39 MG/DL
LDLC SERPL CALC-MCNC: 186 MG/DL
NONHDLC SERPL-MCNC: 238 MG/DL
POTASSIUM SERPL-SCNC: 4.3 MMOL/L (ref 3.4–5.3)
SODIUM SERPL-SCNC: 139 MMOL/L (ref 133–144)
SPECIMEN SOURCE: NORMAL
TRIGL SERPL-MCNC: 258 MG/DL
TSH SERPL DL<=0.005 MIU/L-ACNC: 1.57 MU/L (ref 0.4–4)

## 2017-11-06 PROCEDURE — 87081 CULTURE SCREEN ONLY: CPT | Performed by: PHYSICIAN ASSISTANT

## 2017-11-06 PROCEDURE — 99214 OFFICE O/P EST MOD 30 MIN: CPT | Performed by: PHYSICIAN ASSISTANT

## 2017-11-06 PROCEDURE — 87880 STREP A ASSAY W/OPTIC: CPT | Performed by: PHYSICIAN ASSISTANT

## 2017-11-06 PROCEDURE — 80061 LIPID PANEL: CPT | Performed by: PHYSICIAN ASSISTANT

## 2017-11-06 PROCEDURE — 80048 BASIC METABOLIC PNL TOTAL CA: CPT | Performed by: PHYSICIAN ASSISTANT

## 2017-11-06 PROCEDURE — 36415 COLL VENOUS BLD VENIPUNCTURE: CPT | Performed by: PHYSICIAN ASSISTANT

## 2017-11-06 PROCEDURE — 84443 ASSAY THYROID STIM HORMONE: CPT | Performed by: PHYSICIAN ASSISTANT

## 2017-11-06 ASSESSMENT — PATIENT HEALTH QUESTIONNAIRE - PHQ9
SUM OF ALL RESPONSES TO PHQ QUESTIONS 1-9: 25
10. IF YOU CHECKED OFF ANY PROBLEMS, HOW DIFFICULT HAVE THESE PROBLEMS MADE IT FOR YOU TO DO YOUR WORK, TAKE CARE OF THINGS AT HOME, OR GET ALONG WITH OTHER PEOPLE: EXTREMELY DIFFICULT
SUM OF ALL RESPONSES TO PHQ QUESTIONS 1-9: 25

## 2017-11-06 NOTE — NURSING NOTE
"Chief Complaint   Patient presents with     Hypertension     Weight Loss     options     Panel Management     hpv/pap, height, bmp       Initial BP (!) 138/96 (BP Location: Right arm, Patient Position: Chair, Cuff Size: Adult Regular)  Pulse 74  Temp 98.1  F (36.7  C) (Oral)  Resp 16  Ht 5' 5.75\" (1.67 m)  Wt 241 lb (109.3 kg)  SpO2 98%  BMI 39.2 kg/m2 Estimated body mass index is 39.2 kg/(m^2) as calculated from the following:    Height as of this encounter: 5' 5.75\" (1.67 m).    Weight as of this encounter: 241 lb (109.3 kg).  Medication Reconciliation: complete  "

## 2017-11-06 NOTE — PROGRESS NOTES
Please call patient with the following message    Labs normal except cholesterol which has increased from 1 year ago. ASCVD risk score (as we talked about in clinic) was 1.7%, is now 2.2%, so still no need for medication, but should work on low fat diet and weight loss.     Bassem Oneill PA-C      The 10-year ASCVD risk score ([place] TILLMAN Jr, et al., 2013) is: 2.2%    Values used to calculate the score:      Age: 42 years      Sex: Female      Is Non- : No      Diabetic: No      Tobacco smoker: No      Systolic Blood Pressure: 128 mmHg      Is BP treated: No      HDL Cholesterol: 39 mg/dL      Total Cholesterol: 277 mg/dL

## 2017-11-06 NOTE — PATIENT INSTRUCTIONS
1. Blood pressure   - Labs today, await results   - Monitor BP, return to clinic if continues to be >140/90     2. Lipids   - Labs today, await results  The 10-year ASCVD risk score ([place] LAYNE Jr, et al., 2013) is: 1.7%    Values used to calculate the score:      Age: 42 years      Sex: Female      Is Non- : No      Diabetic: No      Tobacco smoker: No      Systolic Blood Pressure: 128 mmHg      Is BP treated: No      HDL Cholesterol: 36 mg/dL      Total Cholesterol: 224 mg/dL    3. Obesity   - Start Contrave       Initial: One tablet (naltrexone 8 mg/bupropion 90 mg) once daily in the morning for 1 week; at week 2, increase to 1 tablet twice daily administered in the morning and late afternoon and continue for 1 week; at week 3, increase to 2 tablets in the morning and 1 tablet in the evening and continue for 1 week; at week 4, increase to 2 tablets twice daily administered in the morning and evening and continue for the remainder of the treatment course.  - Recheck every 2-3 weeks   - Consider bariatric referral       4. Mood  - Contrave will help, monitor symptoms                                              Dietary Approaches to Stop Hypertension (The DASH Diet)   What is hypertension?   Hypertension is blood pressure that keeps being higher than normal. Blood pressure is the force of blood against artery walls as the heart pumps blood through the body. Blood pressure can be unhealthy if it is above 120/80. The higher your blood pressure, the greater the health risk.   High blood pressure can be controlled if you take these steps:   Maintain a healthy weight.   Are physically active.   Follow a healthy eating plan, which includes foods that do not have a lot of salt and sodium.   Do not drink a lot of alcohol.   Diet affects high blood pressure. Following the DASH diet and reducing the amount of sodium in your diet will help lower your blood pressure. It will also help prevent high blood  pressure.   What is the DASH diet?   Dietary Approaches to Stop Hypertension (DASH) is a diet that is low in saturated fat, cholesterol, and total fat. It emphasizes fruits, vegetables, and low-fat dairy foods. The DASH diet also includes whole-grain products, fish, poultry, and nuts. It encourages fewer servings of red meat, sweets, and sugar-containing beverages. It is rich in magnesium, potassium, and calcium, as well as protein and fiber.   How do I get started on the DASH diet?   The DASH diet requires no special foods and has no hard-to-follow recipes. Start by seeing how DASH compares with your current eating habits.   The DASH eating plan illustrated below is based on a diet of 2,000 calories a day. Your healthcare provider or a dietitian can help you determine how many calories a day you need. Most adults need somewhere between 1600 and 2800 calories a day. Serving sizes for different foods vary from 1/2 cup to 1 and 1/4 cups. Check product nutrition labels for serving sizes and the number of calories per serving.                      Number of        Examples of  Food Group      servings         serving size  ----------------------------------------------------------------    Grains and      7 to 8 per day   1 slice of bread  grain products                   1 cup ready-to-eat cold cereal                                   1/2 cup cooked rice, pasta,                                   or cereal    Vegetables      4 to 5 per day   1 cup raw leafy vegetable                                   1/2 cup cooked vegetable                                   6 ounces (oz) vegetable juice      Fruits          4 to 5 per day   1 medium fruit                                   1/4 cup dried fruit                                   1/2 cup fresh, frozen, or                                   canned fruit                                   6 oz fruit juice      Low-fat or      2 to 3 per day   8 oz milk  fat-free                          1 cup yogurt  dairy foods                      1 and 1/2 oz cheese    Lean meats,  poultry,        2 or fewer per   3 oz cooked lean meat,  or fish         day              skinless poultry, or fish    Nuts, seeds,    4 to 5 per week  1/3 cup or 1 and 1/2 oz nuts  and dry beans                    1 tablespoon or 1/2 oz seeds                                   1/2 cup cooked dry beans    Fats and oils   2 to 3 per day   1 teaspoon soft margarine                                   1 tablespoon low-fat mayonnaise                                   2 tablespoons light salad                                   dressing                                   1 teaspoon vegetable oil    Sweets          5 per week       1 tablespoon sugar                                   1 tablespoon jelly or jam                                   1/2 oz jelly beans                                   8 oz lemonade  ----------------------------------------------------------------  Make changes gradually. Here are some suggestions that might help:   If you now eat 1 or 2 servings of vegetables a day, add a serving at lunch and another at dinner.   If you have not been eating fruit regularly, or have only juice at breakfast, add a serving to your meals or have it as a snack.   Drink milk or water with lunch or dinner instead of soda, sugar-sweetened tea, or alcohol. Choose low-fat (1%) or fat-free (nonfat) dairy products so that you are eating fewer calories and less saturated fat, total fat, and cholesterol.   Read food labels on margarines and salad dressings to choose products lowest in fat.   If you now eat large portions of meat, slowly cut back--by a half or a third at each meal. Limit meat to 6 ounces a day (two 3-ounce servings). Three to 4 ounces is about the size of a deck of cards.   Have 2 or more meatless meals each week. Increase servings of vegetables, rice, pasta, and beans in all meals. Try casseroles, pasta, and stir-nicholson dishes,  which have less meat and more vegetables, grains, and beans.   Use fruits canned in their own juice. Fresh fruits require little or no preparation. Dried fruits are a good choice to carry with you or to have ready in the car.   Try these snacks ideas: unsalted pretzels or nuts mixed with raisins, eve crackers, low-fat and fat-free yogurt or frozen yogurt, popcorn with no salt or butter added, and raw vegetables.   Choose whole-grain foods to get more nutrients, including minerals and fiber. For example, choose whole-wheat bread, whole-grain cereals, or brown rice.   Use fresh, frozen, or no-salt-added canned vegetables.   Remember to also reduce the salt and sodium in your diet. Try to have no more than 2000 milligrams (mg) of sodium per day, with a goal of further reducing the sodium to 1500 mg per day. Three important ways to reduce sodium are:   Eat food products with reduced-sodium or no salt added.   Use less salt when you prepare foods and do not add salt to your food at the table.   Read food labels. Aim for foods that contain less than 5% of the daily value of sodium.   The DASH eating plan is not designed for weight loss. But it contains many lower-calorie foods, such as fruits and vegetables. You can make it lower in calories by replacing high-calorie foods with more fruits and vegetables. Some ideas to increase fruits and vegetables and decrease calories include:   Eat a medium apple instead of 4 shortbread cookies. You'll save 80 calories.   Eat 1/4 cup of dried apricots instead of a 2-ounce bag of pork rinds. You'll save 230 calories.   Have a hamburger that's 3 ounces instead of 6 ounces. Add a 1/2 cup serving of carrots and a 1/2 cup serving of spinach. You'll save more than 200 calories.   Instead of 5 ounces of chicken, have a stir nicholson with 2 ounces of chicken and 1 and 1/2 cups of raw vegetables. Use just a small amount of vegetable oil. You'll save 50 calories.   Have a 1/2 cup serving of  low-fat frozen yogurt instead of a 1-and-1/2-ounce chocolate bar. You'll save about 110 calories.   Use low-fat or fat-free condiments, such as fat-free salad dressings.   Eat smaller portions. Cut back gradually.   Use food labels to compare fat and calorie content in packaged foods. Items marked low fat or fat free may be lower in fat but not lower in calories than their regular versions.   Limit foods with lots of added sugar, such as pies, flavored yogurts, candy bars, ice cream, sherbet, regular soft drinks, and fruit drinks.   Drink water or club soda instead of cola or other soda drinks.     For more information, see the National [employer] [place] Web site at: http://www.nhlbi.nih.gov/health/public/heart/hbp/dash/.

## 2017-11-06 NOTE — MR AVS SNAPSHOT
After Visit Summary   11/6/2017    Andree Kennedy    MRN: [id number]           Patient Information     Date Of Birth          1975        Visit Information        Provider Department      11/6/2017 10:30 AM Letty Oneill PA-C [facility]        Today's Diagnoses     HTN, goal below 140/90    -  1    Class 2 obesity due to excess calories without serious comorbidity with body mass index (BMI) of 35.0 to 35.9 in adult        Hyperlipidemia LDL goal <130        Throat pain        Anxiety state        Mild episode of recurrent major depressive disorder (H)        Bipolar I disorder (H)        Seasonal affective disorder (H)          Care Instructions    1. Blood pressure   - Labs today, await results   - Monitor BP, return to clinic if continues to be >140/90     2. Lipids   - Labs today, await results  The 10-year ASCVD risk score (Sita LAYNE Jr, et al., 2013) is: 1.7%    Values used to calculate the score:      Age: 42 years      Sex: Female      Is Non- : No      Diabetic: No      Tobacco smoker: No      Systolic Blood Pressure: 128 mmHg      Is BP treated: No      HDL Cholesterol: 36 mg/dL      Total Cholesterol: 224 mg/dL    3. Obesity   - Start Contrave       Initial: One tablet (naltrexone 8 mg/bupropion 90 mg) once daily in the morning for 1 week; at week 2, increase to 1 tablet twice daily administered in the morning and late afternoon and continue for 1 week; at week 3, increase to 2 tablets in the morning and 1 tablet in the evening and continue for 1 week; at week 4, increase to 2 tablets twice daily administered in the morning and evening and continue for the remainder of the treatment course.  - Recheck every 2-3 weeks   - Consider bariatric referral       4. Mood  - Contrave will help, monitor symptoms                                              Dietary Approaches to Stop Hypertension (The DASH Diet)   What is hypertension?    Hypertension is blood pressure that keeps being higher than normal. Blood pressure is the force of blood against artery walls as the heart pumps blood through the body. Blood pressure can be unhealthy if it is above 120/80. The higher your blood pressure, the greater the health risk.   High blood pressure can be controlled if you take these steps:   Maintain a healthy weight.   Are physically active.   Follow a healthy eating plan, which includes foods that do not have a lot of salt and sodium.   Do not drink a lot of alcohol.   Diet affects high blood pressure. Following the DASH diet and reducing the amount of sodium in your diet will help lower your blood pressure. It will also help prevent high blood pressure.   What is the DASH diet?   Dietary Approaches to Stop Hypertension (DASH) is a diet that is low in saturated fat, cholesterol, and total fat. It emphasizes fruits, vegetables, and low-fat dairy foods. The DASH diet also includes whole-grain products, fish, poultry, and nuts. It encourages fewer servings of red meat, sweets, and sugar-containing beverages. It is rich in magnesium, potassium, and calcium, as well as protein and fiber.   How do I get started on the DASH diet?   The DASH diet requires no special foods and has no hard-to-follow recipes. Start by seeing how DASH compares with your current eating habits.   The DASH eating plan illustrated below is based on a diet of 2,000 calories a day. Your healthcare provider or a dietitian can help you determine how many calories a day you need. Most adults need somewhere between 1600 and 2800 calories a day. Serving sizes for different foods vary from 1/2 cup to 1 and 1/4 cups. Check product nutrition labels for serving sizes and the number of calories per serving.                      Number of        Examples of  Food Group      servings         serving size  ----------------------------------------------------------------    Grains and      7 to 8 per  day   1 slice of bread  grain products                   1 cup ready-to-eat cold cereal                                   1/2 cup cooked rice, pasta,                                   or cereal    Vegetables      4 to 5 per day   1 cup raw leafy vegetable                                   1/2 cup cooked vegetable                                   6 ounces (oz) vegetable juice      Fruits          4 to 5 per day   1 medium fruit                                   1/4 cup dried fruit                                   1/2 cup fresh, frozen, or                                   canned fruit                                   6 oz fruit juice      Low-fat or      2 to 3 per day   8 oz milk  fat-free                         1 cup yogurt  dairy foods                      1 and 1/2 oz cheese    Lean meats,  poultry,        2 or fewer per   3 oz cooked lean meat,  or fish         day              skinless poultry, or fish    Nuts, seeds,    4 to 5 per week  1/3 cup or 1 and 1/2 oz nuts  and dry beans                    1 tablespoon or 1/2 oz seeds                                   1/2 cup cooked dry beans    Fats and oils   2 to 3 per day   1 teaspoon soft margarine                                   1 tablespoon low-fat mayonnaise                                   2 tablespoons light salad                                   dressing                                   1 teaspoon vegetable oil    Sweets          5 per week       1 tablespoon sugar                                   1 tablespoon jelly or jam                                   1/2 oz jelly beans                                   8 oz lemonade  ----------------------------------------------------------------  Make changes gradually. Here are some suggestions that might help:   If you now eat 1 or 2 servings of vegetables a day, add a serving at lunch and another at dinner.   If you have not been eating fruit regularly, or have only juice at breakfast, add a serving to  your meals or have it as a snack.   Drink milk or water with lunch or dinner instead of soda, sugar-sweetened tea, or alcohol. Choose low-fat (1%) or fat-free (nonfat) dairy products so that you are eating fewer calories and less saturated fat, total fat, and cholesterol.   Read food labels on margarines and salad dressings to choose products lowest in fat.   If you now eat large portions of meat, slowly cut back--by a half or a third at each meal. Limit meat to 6 ounces a day (two 3-ounce servings). Three to 4 ounces is about the size of a deck of cards.   Have 2 or more meatless meals each week. Increase servings of vegetables, rice, pasta, and beans in all meals. Try casseroles, pasta, and stir-nicholson dishes, which have less meat and more vegetables, grains, and beans.   Use fruits canned in their own juice. Fresh fruits require little or no preparation. Dried fruits are a good choice to carry with you or to have ready in the car.   Try these snacks ideas: unsalted pretzels or nuts mixed with raisins, eve crackers, low-fat and fat-free yogurt or frozen yogurt, popcorn with no salt or butter added, and raw vegetables.   Choose whole-grain foods to get more nutrients, including minerals and fiber. For example, choose whole-wheat bread, whole-grain cereals, or brown rice.   Use fresh, frozen, or no-salt-added canned vegetables.   Remember to also reduce the salt and sodium in your diet. Try to have no more than 2000 milligrams (mg) of sodium per day, with a goal of further reducing the sodium to 1500 mg per day. Three important ways to reduce sodium are:   Eat food products with reduced-sodium or no salt added.   Use less salt when you prepare foods and do not add salt to your food at the table.   Read food labels. Aim for foods that contain less than 5% of the daily value of sodium.   The DASH eating plan is not designed for weight loss. But it contains many lower-calorie foods, such as fruits and vegetables. You  can make it lower in calories by replacing high-calorie foods with more fruits and vegetables. Some ideas to increase fruits and vegetables and decrease calories include:   Eat a medium apple instead of 4 shortbread cookies. You'll save 80 calories.   Eat 1/4 cup of dried apricots instead of a 2-ounce bag of pork rinds. You'll save 230 calories.   Have a hamburger that's 3 ounces instead of 6 ounces. Add a 1/2 cup serving of carrots and a 1/2 cup serving of spinach. You'll save more than 200 calories.   Instead of 5 ounces of chicken, have a stir nicholson with 2 ounces of chicken and 1 and 1/2 cups of raw vegetables. Use just a small amount of vegetable oil. You'll save 50 calories.   Have a 1/2 cup serving of low-fat frozen yogurt instead of a 1-and-1/2-ounce chocolate bar. You'll save about 110 calories.   Use low-fat or fat-free condiments, such as fat-free salad dressings.   Eat smaller portions. Cut back gradually.   Use food labels to compare fat and calorie content in packaged foods. Items marked low fat or fat free may be lower in fat but not lower in calories than their regular versions.   Limit foods with lots of added sugar, such as pies, flavored yogurts, candy bars, ice cream, sherbet, regular soft drinks, and fruit drinks.   Drink water or club soda instead of cola or other soda drinks.     For more information, see the National Heart, Lung, and [place] Web site at: http://www.nhlbi.nih.gov/health/public/heart/hbp/dash/.             Follow-ups after your visit        Additional Services     BARIATRIC ADULT REFERRAL       Your provider has referred you to: PREFERRED PROVIDERS:  G: [facility] [phone]  [email or web address]  [facility] [phone].  "[email or web address]    Please be aware that coverage of these services is subject to the terms and limitations of your health insurance plan.  Call member services at your health plan with any benefit or coverage questions.      Please bring the following with you to your appointment:      (1) List of current medications   (2) This referral request   (3) Any documents/labs given to you for this referral                  Your next 10 appointments already scheduled     Nov 07, 2017  1:00 PM CST   PHYSICAL with FRAN Cabrera CNM   [facility]    [address] [facility] [address] [phone]              Who to contact     If you have questions or need follow up information about today's clinic visit or your schedule please contact [facility] directly at [phone].  Normal or non-critical lab and imaging results will be communicated to you by MyChart, letter or phone within 4 business days after the clinic has received the results. If you do not hear from us within 7 days, please contact the clinic through Zabu Studiohart or phone. If you have a critical or abnormal lab result, we will notify you by phone as soon as possible.  Submit refill requests through SureDone or call your pharmacy and they will forward the refill request to us. Please allow 3 business days for your refill to be completed.          Additional Information About Your Visit        [employer] Information     SureDone lets you send messages to your doctor, view your test results, renew your prescriptions, schedule appointments and more. To sign up, go to [email or web address] . Click on \"Log in\" on the left side of the screen, which will take you to the Welcome page. Then click on \"Sign up Now\" on the right side of the page.     You will be asked to enter the access code listed below, as well as some personal " "information. Please follow the directions to create your username and password.     Your access code is: [email or web address]  Expires: 2018  8:14 AM     Your access code will  in 90 days. If you need help or a new code, please call your [place] clinic or [phone].        Care EveryWhere ID     This is your Care EveryWhere ID. This could be used by other organizations to access your [place] medical records  [id number]        Your Vitals Were     Pulse Temperature Respirations Height Pulse Oximetry BMI (Body Mass Index)    74 98.1  F (36.7  C) (Oral) 16 5' 5.75\" (1.67 m) 98% 39.2 kg/m2       Blood Pressure from Last 3 Encounters:   17 128/88   17 132/84   17 126/89    Weight from Last 3 Encounters:   17 241 lb (109.3 kg)   17 231 lb 4 oz (104.9 kg)   17 232 lb (105.2 kg)              We Performed the Following     BARIATRIC ADULT REFERRAL     BASIC METABOLIC PANEL     Beta strep group A culture     Lipid panel reflex to direct LDL Fasting     Strep, Rapid Screen     TSH with free T4 reflex          Today's Medication Changes          These changes are accurate as of: 17 11:05 AM.  If you have any questions, ask your nurse or doctor.               Start taking these medicines.        Dose/Directions    naltrexone-bupropion 8-90 MG per 12 hr tablet   Commonly known as:  CONTRAVE   Used for:  Class 2 obesity due to excess calories without serious comorbidity with body mass index (BMI) of 35.0 to 35.9 in adult, Anxiety state, Mild episode of recurrent major depressive disorder (H)   Started by:  Letty Oneill PA-C        Take 1 tablet in the morning for 1 week, then increase to 1 tablet in morning and 1 in afternoon.   Quantity:  60 tablet   Refills:  0            Where to get your medicines      These medications were sent to [facility] [address]  [address] [facility] [address]     Phone:  [phone]     " naltrexone-bupropion 8-90 MG per 12 hr tablet                Primary Care Provider Office Phone # Fax #    Letty HUNT ARIA Oneill [phone]       [address] [place] [address]        Equal Access to Services     ERIC VALENTIN : Hadii catrachita ku hadasho Soomaali, waaxda luqadaha, qaybta kaalmada adeegyada, waxay tahira hayarchana mcnamarajessicanorma bergman. So [facility] [phone].    ATENCIÓN: Si habla español, tiene a rivers disposición servicios gratuitos de asistencia lingüística. Llame al [phone].    We comply with applicable federal civil rights laws and [place] laws. We do not discriminate on the basis of race, color, national origin, age, disability, sex, sexual orientation, or gender identity.            Thank you!     Thank you for choosing [facility]  for your care. Our goal is always to provide you with excellent care. Hearing back from our patients is one way we can continue to improve our services. Please take a few minutes to complete the written survey that you may receive in the mail after your visit with us. Thank you!             Your Updated Medication List - Protect others around you: Learn how to safely use, store and throw away your medicines at www.disposemymeds.org.          This list is accurate as of: 11/6/17 11:05 AM.  Always use your most recent med list.                   Brand Name Dispense Instructions for use Diagnosis    LATUDA PO      Take 40 mg by mouth daily        naltrexone-bupropion 8-90 MG per 12 hr tablet    CONTRAVE    60 tablet    Take 1 tablet in the morning for 1 week, then increase to 1 tablet in morning and 1 in afternoon.    Class 2 obesity due to excess calories without serious comorbidity with body mass index (BMI) of 35.0 to 35.9 in adult, Anxiety state, Mild episode of recurrent major depressive disorder (H)       XANAX PO      Take by mouth every 4 hours as needed for anxiety        ZOLOFT PO      Take 100 mg by mouth  daily

## 2017-11-07 ENCOUNTER — OFFICE VISIT (OUTPATIENT)
Dept: OBGYN | Facility: OTHER | Age: 42
End: 2017-11-07
Payer: MEDICARE

## 2017-11-07 VITALS
BODY MASS INDEX: 38.06 KG/M2 | HEART RATE: 68 BPM | SYSTOLIC BLOOD PRESSURE: 132 MMHG | HEIGHT: 67 IN | WEIGHT: 242.5 LBS | DIASTOLIC BLOOD PRESSURE: 80 MMHG

## 2017-11-07 DIAGNOSIS — R87.612 PAPANICOLAOU SMEAR OF CERVIX WITH LOW GRADE SQUAMOUS INTRAEPITHELIAL LESION (LGSIL): ICD-10-CM

## 2017-11-07 DIAGNOSIS — Z01.419 ENCOUNTER FOR WELL WOMAN EXAM WITH ROUTINE GYNECOLOGICAL EXAM: Primary | ICD-10-CM

## 2017-11-07 DIAGNOSIS — N89.8 VAGINAL ITCHING: ICD-10-CM

## 2017-11-07 DIAGNOSIS — N89.8 VAGINAL ODOR: ICD-10-CM

## 2017-11-07 LAB
BACTERIA SPEC CULT: NORMAL
SPECIMEN SOURCE: NORMAL
SPECIMEN SOURCE: NORMAL
WET PREP SPEC: NORMAL

## 2017-11-07 PROCEDURE — 88141 CYTOPATH C/V INTERPRET: CPT | Performed by: ADVANCED PRACTICE MIDWIFE

## 2017-11-07 PROCEDURE — G0476 HPV COMBO ASSAY CA SCREEN: HCPCS | Performed by: ADVANCED PRACTICE MIDWIFE

## 2017-11-07 PROCEDURE — 99396 PREV VISIT EST AGE 40-64: CPT | Performed by: ADVANCED PRACTICE MIDWIFE

## 2017-11-07 PROCEDURE — 87210 SMEAR WET MOUNT SALINE/INK: CPT | Performed by: ADVANCED PRACTICE MIDWIFE

## 2017-11-07 PROCEDURE — 88175 CYTOPATH C/V AUTO FLUID REDO: CPT | Performed by: ADVANCED PRACTICE MIDWIFE

## 2017-11-07 RX ORDER — TERCONAZOLE 8 MG/G
1 CREAM VAGINAL AT BEDTIME
Qty: 20 G | Refills: 0 | Status: SHIPPED | OUTPATIENT
Start: 2017-11-07 | End: 2017-11-10

## 2017-11-07 ASSESSMENT — ANXIETY QUESTIONNAIRES
IF YOU CHECKED OFF ANY PROBLEMS ON THIS QUESTIONNAIRE, HOW DIFFICULT HAVE THESE PROBLEMS MADE IT FOR YOU TO DO YOUR WORK, TAKE CARE OF THINGS AT HOME, OR GET ALONG WITH OTHER PEOPLE: EXTREMELY DIFFICULT
1. FEELING NERVOUS, ANXIOUS, OR ON EDGE: NEARLY EVERY DAY
2. NOT BEING ABLE TO STOP OR CONTROL WORRYING: NEARLY EVERY DAY
GAD7 TOTAL SCORE: 21
5. BEING SO RESTLESS THAT IT IS HARD TO SIT STILL: NEARLY EVERY DAY
7. FEELING AFRAID AS IF SOMETHING AWFUL MIGHT HAPPEN: NEARLY EVERY DAY
3. WORRYING TOO MUCH ABOUT DIFFERENT THINGS: NEARLY EVERY DAY
6. BECOMING EASILY ANNOYED OR IRRITABLE: NEARLY EVERY DAY

## 2017-11-07 ASSESSMENT — PATIENT HEALTH QUESTIONNAIRE - PHQ9
SUM OF ALL RESPONSES TO PHQ QUESTIONS 1-9: 25
5. POOR APPETITE OR OVEREATING: NEARLY EVERY DAY
SUM OF ALL RESPONSES TO PHQ QUESTIONS 1-9: 25

## 2017-11-07 NOTE — NURSING NOTE
"Chief Complaint   Patient presents with     Physical       Initial /80 (BP Location: Right arm, Patient Position: Chair, Cuff Size: Adult Large)  Pulse 68  Ht 5' 7\" (1.702 m)  Wt 242 lb 8 oz (110 kg)  BMI 37.98 kg/m2 Estimated body mass index is 37.98 kg/(m^2) as calculated from the following:    Height as of this encounter: 5' 7\" (1.702 m).    Weight as of this encounter: 242 lb 8 oz (110 kg).  Medication Reconciliation: navjot Street CMA      "

## 2017-11-07 NOTE — MR AVS SNAPSHOT
After Visit Summary   11/7/2017    Andree Kennedy    MRN: [id number]           Patient Information     Date Of Birth          1975        Visit Information        Provider Department      11/7/2017 1:00 PM Arelis Melgar APRN [facility]        Today's Diagnoses     Encounter for well woman exam with routine gynecological exam    -  1    Vaginal odor        Papanicolaou smear of cervix with low grade squamous intraepithelial lesion (LGSIL)        Vaginal itching          Care Instructions      Preventive Health Recommendations  Female Ages 40 to 49    Yearly exam:     See your health care provider every year in order to  1. Review health changes.   2. Discuss preventive care.    3. Review your medicines if your doctor prescribed any.      Get a Pap test every three years (unless you have an abnormal result and your provider advises testing more often).      If you get Pap tests with HPV test, you only need to test every 5 years, unless you have an abnormal result. You do not need a Pap test if your uterus was removed (hysterectomy) and you have not had cancer.      You should be tested each year for STDs (sexually transmitted diseases), if you're at risk.       Ask your doctor if you should have a mammogram.      Have a colonoscopy (test for colon cancer) if someone in your family has had colon cancer or polyps before age 50.       Have a cholesterol test every 5 years.       Have a diabetes test (fasting glucose) after age 45. If you are at risk for diabetes, you should have this test every 3 years.    Shots: Get a flu shot each year. Get a tetanus shot every 10 years.     Nutrition:     Eat at least 5 servings of fruits and vegetables each day.    Eat whole-grain bread, whole-wheat pasta and brown rice instead of white grains and rice.    Talk to your provider about Calcium and Vitamin D.     Lifestyle    Exercise at least 150 minutes a week (an average of 30 minutes a  "day, 5 days a week). This will help you control your weight and prevent disease.    Limit alcohol to one drink per day.    No smoking.     Wear sunscreen to prevent skin cancer.    See your dentist every six months for an exam and cleaning.          Follow-ups after your visit        Who to contact     If you have questions or need follow up information about today's clinic visit or your schedule please contact [facility] directly at [phone].  Normal or non-critical lab and imaging results will be communicated to you by Appotahart, letter or phone within 4 business days after the clinic has received the results. If you do not hear from us within 7 days, please contact the clinic through Appotahart or phone. If you have a critical or abnormal lab result, we will notify you by phone as soon as possible.  Submit refill requests through MyMedMatch or call your pharmacy and they will forward the refill request to us. Please allow 3 business days for your refill to be completed.          Additional Information About Your Visit        MyMedMatch Information     MyMedMatch lets you send messages to your doctor, view your test results, renew your prescriptions, schedule appointments and more. To sign up, go to [email or web address] . Click on \"Log in\" on the left side of the screen, which will take you to the Welcome page. Then click on \"Sign up Now\" on the right side of the page.     You will be asked to enter the access code listed below, as well as some personal information. Please follow the directions to create your username and password.     Your access code is: [email or web address]  Expires: 2018  8:14 AM     Your access code will  in 90 days. If you need help or a new code, please call your [place] clinic or [phone].        Care EveryWhere ID     This is your Care EveryWhere ID. This could be used by other organizations to access your [place] medical records  [id number]        Your Vitals Were     " "Pulse Height Last Period BMI (Body Mass Index)          68 5' 7\" (1.702 m) 10/23/2017 (Exact Date) 37.98 kg/m2         Blood Pressure from Last 3 Encounters:   11/07/17 132/80   11/06/17 128/88   02/09/17 132/84    Weight from Last 3 Encounters:   11/07/17 242 lb 8 oz (110 kg)   11/06/17 241 lb (109.3 kg)   02/09/17 231 lb 4 oz (104.9 kg)              We Performed the Following     HPV High Risk Types DNA Cervical     Pap imaged thin layer diagnostic with HPV (select HPV order below)     Wet prep          Today's Medication Changes          These changes are accurate as of: 11/7/17  2:41 PM.  If you have any questions, ask your nurse or doctor.               Start taking these medicines.        Dose/Directions    terconazole 0.8 % cream   Commonly known as:  TERAZOL 3   Used for:  Vaginal itching   Started by:  Arelis Melgar APRN CNM        Dose:  1 applicator   Place 1 applicator (1 g) vaginally At Bedtime for 3 days   Quantity:  20 g   Refills:  0            Where to get your medicines      These medications were sent to [facility] [address] [place], [place] [address] [address] [address]     Phone:  [phone]     terconazole 0.8 % cream                Primary Care Provider Office Phone # Fax #    Letty HUNT ARIA Oneill [phone]       [address] [place] [address] [address]  [facility] [address]        Equal Access to Services     [facility] AH: Hadii catrachita ku hadasho Soomaali, waaxda luqadaha, qaybta kaalmada adeegyada, waxeve bergman. So [facility] [phone].    ATENCIÓN: Si habla español, tiene a rivers disposición servicios gratuitos de asistencia lingüística. Llame al [phone].    We comply with applicable federal civil rights laws and [place] laws. We do not discriminate on the basis of race, color, national origin, age, disability, sex, sexual orientation, or gender identity.            Thank you!     Thank you for choosing [facility]  for your care. Our goal is always to provide you with excellent care. Hearing back from our patients is one way we can continue to improve our services. Please take a few minutes to complete the written survey that you may receive in the mail after your visit with us. Thank you!             Your Updated Medication List - Protect others around you: Learn how to safely use, store and throw away your medicines at www.disposemymeds.org.          This list is accurate as of: 11/7/17  2:41 PM.  Always use your most recent med list.                   Brand Name Dispense Instructions for use Diagnosis    LATUDA PO      Take 40 mg by mouth daily        naltrexone-bupropion 8-90 MG per 12 hr tablet    CONTRAVE    60 tablet    Take 1 tablet in the morning for 1 week, then increase to 1 tablet in morning and 1 in afternoon.    Class 2 obesity due to excess calories without serious comorbidity with body mass index (BMI) of 39.0 to 39.9 in adult, Anxiety state, Mild episode of recurrent major depressive disorder (H)       terconazole 0.8 % cream    TERAZOL 3    20 g    Place 1 applicator (1 g) vaginally At Bedtime for 3 days    Vaginal itching       XANAX PO      Take by mouth every 4 hours as needed for anxiety        ZOLOFT PO      Take 100 mg by mouth daily

## 2017-11-07 NOTE — TELEPHONE ENCOUNTER
[facility] Prior Authorization Team   Phone: [phone]  Fax: [phone]      PA Initiation    Medication: contrave 8-90mg tab  Insurance Company: [employer] - Phone [phone] Fax [phone]  Pharmacy Filling the Rx: [facility] [place], [place] - [address] [place] [address]  Filling Pharmacy Phone: [phone]  Filling Pharmacy Fax: [phone]  Start Date: 11/7/2017

## 2017-11-07 NOTE — TELEPHONE ENCOUNTER
[facility] Prior Authorization Team   Phone: [phone]  Fax: [phone]      PRIOR AUTHORIZATION DENIED    Medication: contrave 8-90mg tab- DENIED    Denial Date: 11/7/2017    Denial Rational: Denied due to contrave being a weight loss medication, which is one of the classes not covered under Medicare Part D and patients plan does not have a supplemental benefit:        Appeal Information:

## 2017-11-07 NOTE — TELEPHONE ENCOUNTER
Prior Authorization Retail Medication Request  Medication/Dose: contrave 8-90mg tab  Diagnosis and ICD code:   New/Renewal/Insurance Change PA:   Previously Tried and Failed Therapies:     Insurance ID (if provided):   Insurance Phone (if provided):     Any additional info from fax request:     If you received a fax notification from an outside Pharmacy:  Pharmacy Name:  Pharmacy #:  Pharmacy Fax:

## 2017-11-07 NOTE — TELEPHONE ENCOUNTER
Ins doesn't cover Contrave 8-90 mg . Try for a prior auth . Ins is  Medica Part d at [id number] and id# [id number]  Elissa Amezcua, Pharmacy [facility] [place] [phone]

## 2017-11-08 ASSESSMENT — ANXIETY QUESTIONNAIRES: GAD7 TOTAL SCORE: 21

## 2017-11-13 ENCOUNTER — TELEPHONE (OUTPATIENT)
Dept: FAMILY MEDICINE | Facility: OTHER | Age: 42
End: 2017-11-13

## 2017-11-13 NOTE — TELEPHONE ENCOUNTER
Patient informed she has an appt scheduled Friday with bariatrics.  She also states there was another medication you had discussed with her that was a lower dose so it was long term.  I asked if she would like to discuss that with the specialist Friday and she requested message be sent to you as you had discussed it with her.

## 2017-11-13 NOTE — TELEPHONE ENCOUNTER
Pt following up on medication Contrave below. Pt states hasnt heard anything yet. Please follow up with pt at [phone]

## 2017-11-13 NOTE — TELEPHONE ENCOUNTER
Please call patient     - All weight loss medications will be denied under your insurance plan.   - We could consider an appeal but not likely to be covered. Likely will need to pay out of pocket for this   - Should talk to the pharmacy about this - see what her out of pocket would be for this vs phentermine-topiramate   - The other option would be to start with the bariatric center, they can get more things covered sometimes     Let me know how she would like to proceed.     Bassem Oneill PA-C  [facility]

## 2017-11-13 NOTE — TELEPHONE ENCOUNTER
Yes, this is the phentermine-topiramate I mentioned below    I have signed this rx. Please fax to pharmacy, however due to information on Prior auth, is likely to also not be covered.     Bassem Oneill PA-C  [facility]

## 2017-11-14 ENCOUNTER — TELEPHONE (OUTPATIENT)
Dept: FAMILY MEDICINE | Facility: OTHER | Age: 42
End: 2017-11-14

## 2017-11-14 NOTE — TELEPHONE ENCOUNTER
[facility] Prior Authorization Team   Phone: [phone]  Fax: [phone]      PA Initiation    Medication: qsymia 7.4-46mg   Insurance Company: [employer] - Phone [phone] Fax [phone]  Pharmacy Filling the Rx: [facility] [place], [place] - [address] [place] [address]  Filling Pharmacy Phone: [phone]  Filling Pharmacy Fax: [phone]  Start Date: 11/14/2017

## 2017-11-14 NOTE — TELEPHONE ENCOUNTER
Reason for Call:  Request for results:    Name of test or procedure: lab work    Date of test of procedure: 11/7/17    Location of the test or procedure: Agency    OK to leave the result message on voice mail or with a family member? YES    Phone number Patient can be reached at:  Home number on file [phone] (home)    Additional comments: please with results from lab work.      Call taken on 11/14/2017 at 11:20 AM by Bisi Hitchcock

## 2017-11-14 NOTE — TELEPHONE ENCOUNTER
Spoke with patient and let her know her pap is still pending and she will be contacted by pap nurse when those results are final.  Mary Street CMA

## 2017-11-14 NOTE — TELEPHONE ENCOUNTER
PA required on: Qsymia 7.5-45mg  Patient Insurance: Medica part D  Insurance BIN: [id number]     Insurance PCN: [id number]  Insurance ID: [id number]  Insurance phone number: [phone]   Pharmacy NPI: [id number]    Please let us know if approved or denied, thank you!    On behalf of [facility]    Kat Richmond CPhT  Float Technician

## 2017-11-15 LAB
COPATH REPORT: ABNORMAL
PAP: ABNORMAL

## 2017-11-15 NOTE — TELEPHONE ENCOUNTER
[facility] Prior Authorization Team   Phone: [phone]  Fax: [phone]      PRIOR AUTHORIZATION DENIED    Medication: qsymia 7.4-46mg - DENIED    Denial Date: 11/15/2017    Denial Rational: Denied due to medication being  Weight loss agent which is one of the classes not covered under Part D and for her plan, they do not offer a supplemental benefit:        Appeal Information:

## 2017-11-16 ENCOUNTER — TELEPHONE (OUTPATIENT)
Dept: FAMILY MEDICINE | Facility: OTHER | Age: 42
End: 2017-11-16

## 2017-11-16 LAB
FINAL DIAGNOSIS: ABNORMAL
HPV HR 12 DNA CVX QL NAA+PROBE: POSITIVE
HPV16 DNA SPEC QL NAA+PROBE: NEGATIVE
HPV18 DNA SPEC QL NAA+PROBE: NEGATIVE
SPECIMEN DESCRIPTION: ABNORMAL

## 2017-11-16 NOTE — TELEPHONE ENCOUNTER
Spoke with patient and low grade headache and still feeling sinus pressure. Patient was scheduled for appointment per last OV note if her symptoms had not resolved.     Next 5 appointments (look out 90 days)     Nov 17, 2017 10:15 AM CST   SHORT with Letty Oneill PA-C   [facility]    [address] [place] [id number]   [phone]                Timo Zamora RN, BSN

## 2017-11-16 NOTE — PROGRESS NOTES
SUBJECTIVE:                                                    Andree Kennedy is a 42 year old female who presents to clinic today for the following health issues:      HPI    Acute Illness   Acute illness concerns: sinus  Onset: 10+ days     Fever: no    Chills/Sweats: \Yes    Headache (location?): YES    Sinus Pressure:YES    Conjunctivitis:  no    Ear Pain: no    Rhinorrhea: no     Congestion: YES    Sore Throat: YES- last week     Cough: no    Wheeze: no    Decreased Appetite: YES    Nausea: no     Vomiting: No    Diarrhea:  no    Dysuria/Freq.: no    Fatigue/Achiness: YES    Sick/Strep Exposure: no      Therapies Tried and outcome: None      Medication Followup of Weight loss medication       Taking Medication as prescribed: NO    Side Effects:  NA    Medication Helping Symptoms:  NA     - Contrave $160, [facility] $133 per month      Phentermine $12-16/month   - Dec 1st appointment with Bariatric Center                    RN Triage note from yesterday  Spoke with patient and low grade headache and still feeling sinus pressure. Patient was scheduled for appointment per last OV note if her symptoms had not resolved.         Plan from visit on 11/6/17  8 & 9. Acute illness   - Discussed no signs of infection on exam, negative rapid strep, will await culture   - Continue with OTC medications for symptomatic treatment as helpful   - Return to clinic if symptoms worsen or persist >10 days      Problem list and histories reviewed & adjusted, as indicated.  Additional history: as documented    ROS:  Constitutional, HEENT, cardiovascular, pulmonary, gi and gu systems are negative, except as otherwise noted.      OBJECTIVE:   /89 (BP Location: Left arm, Patient Position: Chair, Cuff Size: Adult Regular)  Pulse 69  Temp 97.5  F (36.4  C) (Oral)  Resp 16  Wt 238 lb (108 kg)  LMP 10/23/2017 (Exact Date)  SpO2 97%  BMI 37.28 kg/m2  Body mass index is 37.28 kg/(m^2).  GENERAL APPEARANCE: moderately ill  appearing, alert and no distress  EYES: Eyes grossly normal to inspection, PERRLA, conjunctivae and sclerae without injection or discharge, EOM intact   HENT: Bilateral ear canals without erythema or cerumen, bilateral TM's pearly grey with normal light reflex, bilateral clear serous effusion with no injection or bulging, nasal turbinates with severe swelling and erythema, yellow-green nasal discharge, mouth without ulcers or lesions, oropharynx mildly erythematous without edema or exudate, post nasal drip present, oral mucous membranes moist, no sinus tenderness   NECK: Bilateral anterior cervical adenopathy, no adenopathy in posterior cervical or supraclavicular regions   RESP: Lungs clear to auscultation - no rales, rhonchi or wheezes   CV: Regular rates and rhythm, normal S1 S2, no S3 or S4, no murmur, click or rub  MS: No musculoskeletal defects are noted and gait is age appropriate without ataxia   SKIN: No suspicious lesions or rashes, hydration status appears adeuqate with normal skin turgor   PSYCH: Alert and oriented x3; speech- coherent , normal rate and volume; able to articulate logical thoughts, able to abstract reason, no tangential thoughts, no hallucinations or delusions, mentation appears normal, Mood is euthymic. Affect is appropriate for this mood state and bright. Thought content is free of suicidal ideation, hallucinations, and delusions. Dress is adequate and upkept. Eye contact is good during conversation.       Diagnostic Test Results:  none     ASSESSMENT/PLAN:       ICD-10-CM    1. Acute sinusitis with symptoms > 10 days J01.90 azithromycin (ZITHROMAX) 250 MG tablet   2. Class 2 obesity due to excess calories without serious comorbidity with body mass index (BMI) of 39.0 to 39.9 in adult E66.09 Phentermine-Topiramate 7.5-46 MG CP24    Z68.39 phentermine 37.5 MG capsule     1. Sinus infection  - Discussed antibiotic use, duration, and side effects  - Encouraged rest and fluids  - Can try  OTC Afrin or nasal saline   - Hand out given     2. Obesity  - Will give her RX for phentermine-topirmate to see cost, she never got previous one   - Discussed I would prefer this as it is approved for long term use   - However, if too expensive, will let her fill the phentermine (one or the other not both)   - Reviewed both use and side effects  - Recheck 1 month after starting   - Continue with plan for bariatric center     The patient indicates understanding of these issues and agrees with the plan.    Follow up: as above         Letty Oneill PA-C  [facility]

## 2017-11-16 NOTE — TELEPHONE ENCOUNTER
Pt saw Bassem for poss strep and wasn't given medications, but said Bassem said if she was still having sinus type symptoms in 10 days she would call something in.   Pharmacy is:  [facility]

## 2017-11-17 ENCOUNTER — OFFICE VISIT (OUTPATIENT)
Dept: FAMILY MEDICINE | Facility: OTHER | Age: 42
End: 2017-11-17
Payer: MEDICARE

## 2017-11-17 VITALS
WEIGHT: 238 LBS | BODY MASS INDEX: 37.28 KG/M2 | RESPIRATION RATE: 16 BRPM | OXYGEN SATURATION: 97 % | DIASTOLIC BLOOD PRESSURE: 89 MMHG | TEMPERATURE: 97.5 F | HEART RATE: 69 BPM | SYSTOLIC BLOOD PRESSURE: 135 MMHG

## 2017-11-17 DIAGNOSIS — E66.09 CLASS 2 OBESITY DUE TO EXCESS CALORIES WITHOUT SERIOUS COMORBIDITY WITH BODY MASS INDEX (BMI) OF 39.0 TO 39.9 IN ADULT: ICD-10-CM

## 2017-11-17 DIAGNOSIS — J01.90 ACUTE SINUSITIS WITH SYMPTOMS > 10 DAYS: Primary | ICD-10-CM

## 2017-11-17 DIAGNOSIS — E66.812 CLASS 2 OBESITY DUE TO EXCESS CALORIES WITHOUT SERIOUS COMORBIDITY WITH BODY MASS INDEX (BMI) OF 39.0 TO 39.9 IN ADULT: ICD-10-CM

## 2017-11-17 PROCEDURE — 99214 OFFICE O/P EST MOD 30 MIN: CPT | Performed by: PHYSICIAN ASSISTANT

## 2017-11-17 RX ORDER — PHENTERMINE HYDROCHLORIDE 37.5 MG/1
37.5 CAPSULE ORAL EVERY MORNING
Qty: 30 CAPSULE | Refills: 0 | Status: SHIPPED | OUTPATIENT
Start: 2017-11-17 | End: 2017-12-27

## 2017-11-17 RX ORDER — AZITHROMYCIN 250 MG/1
TABLET, FILM COATED ORAL
Qty: 6 TABLET | Refills: 1 | Status: SHIPPED | OUTPATIENT
Start: 2017-11-17 | End: 2017-11-30

## 2017-11-17 ASSESSMENT — PAIN SCALES - GENERAL: PAINLEVEL: NO PAIN (0)

## 2017-11-17 NOTE — MR AVS SNAPSHOT
After Visit Summary   11/17/2017    Andree Kennedy    MRN: [id number]           Patient Information     Date Of Birth          1975        Visit Information        Provider Department      11/17/2017 10:15 AM Letty Oneill PA-C [facility]        Today's Diagnoses     Acute sinusitis with symptoms > 10 days    -  1    Class 2 obesity due to excess calories without serious comorbidity with body mass index (BMI) of 39.0 to 39.9 in adult          Care Instructions    - Start Z-pack, two tablets today then 1 tablet for 4 days     Wait 5-7 days after done, if still symptoms then can refill     - Afrin (OTC) nasal spray     Twice a day     For 3 days     - Nasal saline, as much as you need     - Recheck 1 month                   Sinusitis           What is sinusitis?   Sinusitis is swollen, infected linings of the sinuses. The sinuses are hollow spaces in the bones of your face and skull. They connect with the nose through small openings. Like the nose, their linings make mucus.   How does it occur?   Sinusitis occurs when the sinus linings become infected. The passageways from the sinuses to the nose are very narrow. Swelling and mucus may block the passageways. This leads to pressure changes in the sinuses that can be painful.   A number of things can cause swelling and sinusitis. Most often it's allergens (things that cause allergies, like pollen and mold) and viruses, such as viruses that cause the common cold. Whether the cause is allergies or a virus, the sinus linings can swell. When swelling causes the sinus passageway to swell shut, bacteria, viruses, and even fungus can be trapped in the sinuses and cause a sinus infection.   If your nasal bones have been injured or are deformed, causing partial blockage of the sinus openings, you are more likely to get sinusitis.   What are the symptoms?   Symptoms include:   feeling of fullness or pressure in your head   a  headache that is most painful when you first wake up in the morning or when you bend your head down or forward   pain above or below your eyes   aching in the upper jaw and teeth   runny or stuffy nose   cough, especially at night   fluid draining down the back of your throat (postnasal drainage)   sore throat in the morning or evening.   How is it diagnosed?   Your healthcare provider will ask about your symptoms and will examine you. You may have an X-ray to look for swelling, fluid, or small benign growths (polyps) in the sinuses.   How is it treated?   Decongestants may help. They may be nonprescription or prescription. They are available as liquids, pills, and nose sprays.   Your healthcare provider may prescribe an antibiotic. In some cases you may need to take decongestants and antibiotics for several weeks.   You may need nonprescription medicine for pain, such as acetaminophen or ibuprofen. Check with your healthcare provider before you give any medicine that contains aspirin or salicylates to a child or teen. This includes medicines like baby aspirin, some cold medicines, and Pepto Bismol. Children and teens who take aspirin are at risk for a serious illness called Reye's syndrome. Ibuprofen is an NSAID. Nonsteroidal anti-inflammatory medicines (NSAIDs) may cause stomach bleeding and other problems. These risks increase with age. Read the label and take as directed. Unless recommended by your healthcare provider, do not take NSAIDs for more than 10 days for any reason.   If you have chronic or repeated sinus infections, allergies may be the cause. Your healthcare provider may prescribe antihistamine tablets or prescription nasal sprays (steroids or cromolyn) to treat the allergies.   If you have chronic, severe sinusitis that does not respond to treatment with medicines, surgery may be done. The surgeon can create an extra or enlarged passageway in the wall of the sinus cavity. This allows the sinuses to  drain more easily through the nasal passages. This should help them stay free of infection.   How long will the effects last?   Symptoms may get better gradually over 3 to 10 days. Depending on what caused the sinusitis and how severe it is, it may last for days or weeks. The symptoms may come back if you do not finish all of your antibiotic.   How can I take care of myself?   Follow your healthcare provider's instructions.   If you are taking an antibiotic, take all of it as directed by your provider. If you stop taking the medicine when your symptoms are gone but before you have taken all of the medicine, symptoms may come back.   Avoid tobacco smoke.   If you have allergies, take care to avoid the things you are allergic to, such as animal dander.   Add moisture to the air with a humidifier or a vaporizer, unless you have mold allergy (mold may grow in your vaporizer).   Inhale steam from a basin of hot water or shower to help open your sinuses and relieve pain.   Use saline nasal sprays to help wash out nasal passages and clear some mucus from the airways.   Use decongestants as directed on the label or by your provider.   If you are using a nonprescription nasal-spray decongestant, generally you should not use it for more than 3 days. After 3 days it may cause your symptoms to get worse. Ask your healthcare provider if it is OK for you to use a nasal spray decongestant longer than this.   Get plenty of rest.   Drink more fluids to keep the mucus as thin as possible so your sinuses can drain more easily.   Put warm compresses on painful areas.   Take antibiotics as prescribed. Use all of the medicine, even after you feel better. Some sinus infections require 2 to 4 weeks of antibiotic treatment.   See your healthcare provider if the pain lasts for several days or gets worse.   If the sinus areas above or below your eyes are swollen or bulging, see your healthcare provider right away. This symptom may mean that  the infection is spreading. A spreading infection can affect other parts of your body--even the brain--and needs to be treated promptly.   How can I help prevent sinusitis?   Treat your colds and allergies promptly. Use decongestants as soon as you start having symptoms.   Do not smoke and stay away from secondhand smoke.   Drink lots of fluids to keep the mucus thin.   Humidify your home if the air is particularly dry.   If you have sinus infections often, consider having allergy tests.   If sinusitis continues to be a problem despite treatment, you might need an exam by an ear, nose, and throat doctor (called an ENT or otolaryngologist). The specialist will check for polyps or a deformed bone that may be blocking your sinuses.     Published by [employer].  This content is reviewed periodically and is subject to change as new health information becomes available. The information is intended to inform and educate and is not a replacement for medical evaluation, advice, diagnosis or treatment by a healthcare professional.   Developed by [employer].   ? 2010 [employer] and/or its affiliates. All Rights Reserved.   Copyright   [employer] Reference [employer] 2011  Adult Health Advisor                Follow-ups after your visit        Who to contact     If you have questions or need follow up information about today's clinic visit or your schedule please contact [facility] directly at [phone].  Normal or non-critical lab and imaging results will be communicated to you by MyChart, letter or phone within 4 business days after the clinic has received the results. If you do not hear from us within 7 days, please contact the clinic through MyChart or phone. If you have a critical or abnormal lab result, we will notify you by phone as soon as possible.  Submit refill requests through [employer] or call your pharmacy and they will forward the refill request to us. Please allow 3 business days for your refill to  be completed.          Additional Information About Your Visit        [employer] Information     [employer] gives you secure access to your electronic health record. If you see a primary care provider, you can also send messages to your care team and make appointments. If you have questions, please call your primary care clinic.  If you do not have a primary care provider, please call [phone] and they will assist you.        Care EveryWhere ID     This is your Care EveryWhere ID. This could be used by other organizations to access your [place] medical records  [id number]        Your Vitals Were     Pulse Temperature Respirations Last Period Pulse Oximetry BMI (Body Mass Index)    69 97.5  F (36.4  C) (Oral) 16 10/23/2017 (Exact Date) 97% 37.28 kg/m2       Blood Pressure from Last 3 Encounters:   11/17/17 135/89   11/07/17 132/80   11/06/17 128/88    Weight from Last 3 Encounters:   11/17/17 238 lb (108 kg)   11/07/17 242 lb 8 oz (110 kg)   11/06/17 241 lb (109.3 kg)              Today, you had the following     No orders found for display         Today's Medication Changes          These changes are accurate as of: 11/17/17 10:41 AM.  If you have any questions, ask your nurse or doctor.               Start taking these medicines.        Dose/Directions    azithromycin 250 MG tablet   Commonly known as:  ZITHROMAX   Used for:  Acute sinusitis with symptoms > 10 days   Started by:  Letty Oneill PA-C        Two tablets first day, then one tablet daily for four days.   Quantity:  6 tablet   Refills:  1       phentermine 37.5 MG capsule   Used for:  Class 2 obesity due to excess calories without serious comorbidity with body mass index (BMI) of 39.0 to 39.9 in adult   Started by:  Letty Oneill PA-C        Dose:  37.5 mg   Take 1 capsule (37.5 mg) by mouth every morning   Quantity:  30 capsule   Refills:  0            Where to get your medicines      These medications were sent to  [facility] - [place], [place] - [address]  [address], [facility] [address]     Phone:  [phone]     azithromycin 250 MG tablet         Some of these will need a paper prescription and others can be bought over the counter.  Ask your nurse if you have questions.     Bring a paper prescription for each of these medications     phentermine 37.5 MG capsule    Phentermine-Topiramate 7.5-46 MG Cp24                Primary Care Provider Office Phone # Fax #    Letty GILBERT Oneill PA-C [phone]       [address]  [facility] [address]        Equal Access to Services     ERIC VALENTIN : Hadii catrachita lerma hadasho Sodakshaali, waaxda luqadaha, qaybta kaalmada adeegyada, ana avila . So [facility] [phone].    ATENCIÓN: Si habla español, tiene a rivers disposición servicios gratuitos de asistencia lingüística. [facility] [phone].    We comply with applicable federal civil rights laws and [place] laws. We do not discriminate on the basis of race, color, national origin, age, disability, sex, sexual orientation, or gender identity.            Thank you!     Thank you for choosing [facility]  for your care. Our goal is always to provide you with excellent care. Hearing back from our patients is one way we can continue to improve our services. Please take a few minutes to complete the written survey that you may receive in the mail after your visit with us. Thank you!             Your Updated Medication List - Protect others around you: Learn how to safely use, store and throw away your medicines at www.disposemymeds.org.          This list is accurate as of: 11/17/17 10:41 AM.  Always use your most recent med list.                   Brand Name Dispense Instructions for use Diagnosis    azithromycin 250 MG tablet    ZITHROMAX    6 tablet    Two tablets first day, then one tablet daily for four days.    Acute sinusitis with symptoms > 10  days       LATUDA PO      Take 40 mg by mouth daily        naltrexone-bupropion 8-90 MG per 12 hr tablet    CONTRAVE    60 tablet    Take 1 tablet in the morning for 1 week, then increase to 1 tablet in morning and 1 in afternoon.    Class 2 obesity due to excess calories without serious comorbidity with body mass index (BMI) of 39.0 to 39.9 in adult, Anxiety state, Mild episode of recurrent major depressive disorder (H)       phentermine 37.5 MG capsule     30 capsule    Take 1 capsule (37.5 mg) by mouth every morning    Class 2 obesity due to excess calories without serious comorbidity with body mass index (BMI) of 39.0 to 39.9 in adult       Phentermine-Topiramate 7.5-46 MG Cp24     30 capsule    Take 1 tablet by mouth daily    Class 2 obesity due to excess calories without serious comorbidity with body mass index (BMI) of 39.0 to 39.9 in adult       XANAX PO      Take by mouth every 4 hours as needed for anxiety        ZOLOFT PO      Take 100 mg by mouth daily

## 2017-11-17 NOTE — PATIENT INSTRUCTIONS
- Start Z-pack, two tablets today then 1 tablet for 4 days     Wait 5-7 days after done, if still symptoms then can refill     - Afrin (OTC) nasal spray     Twice a day     For 3 days     - Nasal saline, as much as you need     - Recheck 1 month                   Sinusitis           What is sinusitis?   Sinusitis is swollen, infected linings of the sinuses. The sinuses are hollow spaces in the bones of your face and skull. They connect with the nose through small openings. Like the nose, their linings make mucus.   How does it occur?   Sinusitis occurs when the sinus linings become infected. The passageways from the sinuses to the nose are very narrow. Swelling and mucus may block the passageways. This leads to pressure changes in the sinuses that can be painful.   A number of things can cause swelling and sinusitis. Most often it's allergens (things that cause allergies, like pollen and mold) and viruses, such as viruses that cause the common cold. Whether the cause is allergies or a virus, the sinus linings can swell. When swelling causes the sinus passageway to swell shut, bacteria, viruses, and even fungus can be trapped in the sinuses and cause a sinus infection.   If your nasal bones have been injured or are deformed, causing partial blockage of the sinus openings, you are more likely to get sinusitis.   What are the symptoms?   Symptoms include:   feeling of fullness or pressure in your head   a headache that is most painful when you first wake up in the morning or when you bend your head down or forward   pain above or below your eyes   aching in the upper jaw and teeth   runny or stuffy nose   cough, especially at night   fluid draining down the back of your throat (postnasal drainage)   sore throat in the morning or evening.   How is it diagnosed?   Your healthcare provider will ask about your symptoms and will examine you. You may have an X-ray to look for swelling, fluid, or small benign growths (polyps)  in the sinuses.   How is it treated?   Decongestants may help. They may be nonprescription or prescription. They are available as liquids, pills, and nose sprays.   Your healthcare provider may prescribe an antibiotic. In some cases you may need to take decongestants and antibiotics for several weeks.   You may need nonprescription medicine for pain, such as acetaminophen or ibuprofen. Check with your healthcare provider before you give any medicine that contains aspirin or salicylates to a child or teen. This includes medicines like baby aspirin, some cold medicines, and Pepto Bismol. Children and teens who take aspirin are at risk for a serious illness called Reye's syndrome. Ibuprofen is an NSAID. Nonsteroidal anti-inflammatory medicines (NSAIDs) may cause stomach bleeding and other problems. These risks increase with age. Read the label and take as directed. Unless recommended by your healthcare provider, do not take NSAIDs for more than 10 days for any reason.   If you have chronic or repeated sinus infections, allergies may be the cause. Your healthcare provider may prescribe antihistamine tablets or prescription nasal sprays (steroids or cromolyn) to treat the allergies.   If you have chronic, severe sinusitis that does not respond to treatment with medicines, surgery may be done. The surgeon can create an extra or enlarged passageway in the wall of the sinus cavity. This allows the sinuses to drain more easily through the nasal passages. This should help them stay free of infection.   How long will the effects last?   Symptoms may get better gradually over 3 to 10 days. Depending on what caused the sinusitis and how severe it is, it may last for days or weeks. The symptoms may come back if you do not finish all of your antibiotic.   How can I take care of myself?   Follow your healthcare provider's instructions.   If you are taking an antibiotic, take all of it as directed by your provider. If you stop  taking the medicine when your symptoms are gone but before you have taken all of the medicine, symptoms may come back.   Avoid tobacco smoke.   If you have allergies, take care to avoid the things you are allergic to, such as animal dander.   Add moisture to the air with a humidifier or a vaporizer, unless you have mold allergy (mold may grow in your vaporizer).   Inhale steam from a basin of hot water or shower to help open your sinuses and relieve pain.   Use saline nasal sprays to help wash out nasal passages and clear some mucus from the airways.   Use decongestants as directed on the label or by your provider.   If you are using a nonprescription nasal-spray decongestant, generally you should not use it for more than 3 days. After 3 days it may cause your symptoms to get worse. Ask your healthcare provider if it is OK for you to use a nasal spray decongestant longer than this.   Get plenty of rest.   Drink more fluids to keep the mucus as thin as possible so your sinuses can drain more easily.   Put warm compresses on painful areas.   Take antibiotics as prescribed. Use all of the medicine, even after you feel better. Some sinus infections require 2 to 4 weeks of antibiotic treatment.   See your healthcare provider if the pain lasts for several days or gets worse.   If the sinus areas above or below your eyes are swollen or bulging, see your healthcare provider right away. This symptom may mean that the infection is spreading. A spreading infection can affect other parts of your body--even the brain--and needs to be treated promptly.   How can I help prevent sinusitis?   Treat your colds and allergies promptly. Use decongestants as soon as you start having symptoms.   Do not smoke and stay away from secondhand smoke.   Drink lots of fluids to keep the mucus thin.   Humidify your home if the air is particularly dry.   If you have sinus infections often, consider having allergy tests.   If sinusitis continues to  be a problem despite treatment, you might need an exam by an ear, nose, and throat doctor (called an ENT or otolaryngologist). The specialist will check for polyps or a deformed bone that may be blocking your sinuses.     Published by [employer].  This content is reviewed periodically and is subject to change as new health information becomes available. The information is intended to inform and educate and is not a replacement for medical evaluation, advice, diagnosis or treatment by a healthcare professional.   Developed by [employer].   ? 2010 [employer] and/or its affiliates. All Rights Reserved.   Copyright   [employer] 2011  Adult Health Advisor

## 2017-11-17 NOTE — LETTER
My Depression Action Plan  Name: Andree Kennedy   Date of Birth 1975  Date: 11/16/2017    My doctor: Letty Oneill   My clinic: [address]  [facility] [address]  [phone]          GREEN    ZONE   Good Control    What it looks like:     Things are going generally well. You have normal up s and down s. You may even feel depressed from time to time, but bad moods usually last less than a day.   What you need to do:  1. Continue to care for yourself (see self care plan)  2. Check your depression survival kit and update it as needed  3. Follow your physician s recommendations including any medication.  4. Do not stop taking medication unless you consult with your physician first.           YELLOW         ZONE Getting Worse    What it looks like:     Depression is starting to interfere with your life.     It may be hard to get out of bed; you may be starting to isolate yourself from others.    Symptoms of depression are starting to last most all day and this has happened for several days.     You may have suicidal thoughts but they are not constant.   What you need to do:     1. Call your care team, your response to treatment will improve if you keep your care team informed of your progress. Yellow periods are signs an adjustment may need to be made.     2. Continue your self-care, even if you have to fake it!    3. Talk to someone in your support network    4. Open up your depression survival kit           RED    ZONE Medical Alert - Get Help    What it looks like:     Depression is seriously interfering with your life.     You may experience these or other symptoms: You can t get out of bed most days, can t work or engage in other necessary activities, you have trouble taking care of basic hygiene, or basic responsibilities, thoughts of suicide or death that will not go away, self-injurious behavior.     What you need to do:  1. Call your  care team and request a same-day appointment. If they are not available (weekends or after hours) call your local crisis line, emergency room or 911.      Electronically signed by: Augusta Guerra, November 16, 2017    Depression Self Care Plan / Survival Kit    Self-Care for Depression  Here s the deal. Your body and mind are really not as separate as most people think.  What you do and think affects how you feel and how you feel influences what you do and think. This means if you do things that people who feel good do, it will help you feel better.  Sometimes this is all it takes.  There is also a place for medication and therapy depending on how severe your depression is, so be sure to consult with your medical provider and/ or Behavioral Health Consultant if your symptoms are worsening or not improving.     In order to better manage my stress, I will:    Exercise  Get some form of exercise, every day. This will help reduce pain and release endorphins, the  feel good  chemicals in your brain. This is almost as good as taking antidepressants!  This is not the same as joining a gym and then never going! (they count on that by the way ) It can be as simple as just going for a walk or doing some gardening, anything that will get you moving.      Hygiene   Maintain good hygiene (Get out of bed in the morning, Make your bed, Brush your teeth, Take a shower, and Get dressed like you were going to work, even if you are unemployed).  If your clothes don't fit try to get ones that do.    Diet  I will strive to eat foods that are good for me, drink plenty of water, and avoid excessive sugar, caffeine, alcohol, and other mood-altering substances.  Some foods that are helpful in depression are: complex carbohydrates, B vitamins, flaxseed, fish or fish oil, fresh fruits and vegetables.    Psychotherapy  I agree to participate in Individual Therapy (if recommended).    Medication  If prescribed medications, I agree to take them.   Missing doses can result in serious side effects.  I understand that drinking alcohol, or other illicit drug use, may cause potential side effects.  I will not stop my medication abruptly without first discussing it with my provider.    Staying Connected With Others  I will stay in touch with my friends, family members, and my primary care provider/team.    Use your imagination  Be creative.  We all have a creative side; it doesn t matter if it s oil painting, sand castles, or mud pies! This will also kick up the endorphins.    Witness Beauty  (AKA stop and smell the roses) Take a look outside, even in mid-winter. Notice colors, textures. Watch the squirrels and birds.     Service to others  Be of service to others.  There is always someone else in need.  By helping others we can  get out of ourselves  and remember the really important things.  This also provides opportunities for practicing all the other parts of the program.    Humor  Laugh and be silly!  Adjust your TV habits for less news and crime-drama and more comedy.    Control your stress  Try breathing deep, massage therapy, biofeedback, and meditation. Find time to relax each day.     My support system    Clinic Contact:  Phone number:    Contact 1:  Phone number:    Contact 2:  Phone number:    Druze/:  Phone number:    Therapist:  Phone number:    Local crisis center:    Phone number:    Other community support:  Phone number:

## 2017-11-17 NOTE — NURSING NOTE
"Chief Complaint   Patient presents with     Sinus Problem     Panel Management     DAP       Initial /89 (BP Location: Left arm, Patient Position: Chair, Cuff Size: Adult Regular)  Pulse 69  Temp 97.5  F (36.4  C) (Oral)  Resp 16  Wt 238 lb (108 kg)  LMP 10/23/2017 (Exact Date)  SpO2 97%  BMI 37.28 kg/m2 Estimated body mass index is 37.28 kg/(m^2) as calculated from the following:    Height as of 11/7/17: 5' 7\" (1.702 m).    Weight as of this encounter: 238 lb (108 kg).  Medication Reconciliation: complete  "

## 2017-11-24 ENCOUNTER — TELEPHONE (OUTPATIENT)
Dept: FAMILY MEDICINE | Facility: OTHER | Age: 42
End: 2017-11-24

## 2017-11-24 NOTE — TELEPHONE ENCOUNTER
You placed a referral for patient to weight management on 11/6/17.  Patient has not scheduled as of yet.      Please review and forward to team if follow up with the patient is needed.     Thank you!  Elissa/Clinic Referrals Dyad II

## 2017-11-30 ENCOUNTER — OFFICE VISIT (OUTPATIENT)
Dept: OBGYN | Facility: OTHER | Age: 42
End: 2017-11-30
Payer: MEDICARE

## 2017-11-30 VITALS
SYSTOLIC BLOOD PRESSURE: 130 MMHG | HEART RATE: 76 BPM | WEIGHT: 236.75 LBS | DIASTOLIC BLOOD PRESSURE: 90 MMHG | BODY MASS INDEX: 37.08 KG/M2

## 2017-11-30 DIAGNOSIS — R87.612 PAPANICOLAOU SMEAR OF CERVIX WITH LOW GRADE SQUAMOUS INTRAEPITHELIAL LESION (LGSIL): Primary | ICD-10-CM

## 2017-11-30 DIAGNOSIS — Z32.00 PREGNANCY EXAMINATION OR TEST, PREGNANCY UNCONFIRMED: ICD-10-CM

## 2017-11-30 LAB — BETA HCG QUAL IFA URINE: NEGATIVE

## 2017-11-30 PROCEDURE — 88305 TISSUE EXAM BY PATHOLOGIST: CPT | Mod: 26 | Performed by: OBSTETRICS & GYNECOLOGY

## 2017-11-30 PROCEDURE — 88305 TISSUE EXAM BY PATHOLOGIST: CPT | Performed by: OBSTETRICS & GYNECOLOGY

## 2017-11-30 PROCEDURE — 57454 BX/CURETT OF CERVIX W/SCOPE: CPT | Performed by: OBSTETRICS & GYNECOLOGY

## 2017-11-30 PROCEDURE — 84703 CHORIONIC GONADOTROPIN ASSAY: CPT | Performed by: OBSTETRICS & GYNECOLOGY

## 2017-11-30 NOTE — PROGRESS NOTES
"42 year old  presents for colposcopy.      Indication for procedure:low grade squamous intraepithelial lesion (LGSIL), positive hrHPV other  Prior history of cervical dysplasia:   11/3/15 pap LSIL/+ HR HPV. Plan: colposcopy.   11/19/15 colposcopy. ASCUS.  Plan Pap and cotest in 16 LSIL pap/+ HR HPV (not 16 or 18). Plan: colposcopy bef 17.  16 [place] - LSIL.  Plan: \"Pap and HPV in 12 months.   17 LSIL pap, + HR HPV (not 16 or 18)    Patient's last menstrual period was 10/23/2017 (exact date).    Tobacco: No  Gardasil vaccination status:No  Pregnancy test: Negative    Discussed nature of HPV related infection, natural history and association with cervical dysplasia.  Procedure for colposcopy and biopsy was explained to the patient and consent obtained.  All the patient's questions were answered.    PROCEDURE:  COLPOSCOPY  After a procedural timeout was taken, she was positioned in dorsal lithotomy and a speculum was inserted to allow visualization of the cervix. A 5% acetic acid solution was applied to the ectocervix with large swabs. Lugols solution was also applied.  Colposcopic examination was then undertaken of the cervix, distal vaginal canal and vaginal fornices.    FINDINGS:      Cervix:  SCJ visualized - lesion at 12 and 5-7 o'clock. Similar to last year.  Biopsy at 12 oclock.  ECC done.  Hemostasis with monsels.      Vaginal inspection: distal normal without visible lesions.    Vulvar colposcopy: vulvar colposcopy not performed.    Procedure Summary: Patient tolerated procedure well and colposcopy adequate.    Colposcopic Impression: mild dysplasia    15 minutes were spent in face to face discussion regarding her abnormal pap, separate from the procedure.     Plan: Specimens labelled and sent to pathology. and call to discuss Pathology results.  Handout given from [facility].  Will likely proceed with LEEP due to persistent LSIL.   Plan to do in the OR due to poor visualization, " large cervix, and tendency to bleed.  Vaginal walls are lax and fall into the visual field, over the cervix, making a leep a little more challenging.          Tisha Cameron MD

## 2017-11-30 NOTE — MR AVS SNAPSHOT
After Visit Summary   11/30/2017    Andree Kennedy    MRN: [id number]           Patient Information     Date Of Birth          1975        Visit Information        Provider Department      11/30/2017 1:30 PM Tisha Cameron [facility]; [facility]        Today's Diagnoses     Papanicolaou smear of cervix with low grade squamous intraepithelial lesion (LGSIL)    -  1    Pregnancy examination or test, pregnancy unconfirmed           Follow-ups after your visit        Follow-up notes from your care team     Return if symptoms worsen or fail to improve.      Who to contact     If you have questions or need follow up information about today's clinic visit or your schedule please contact [facility] directly at [phone].  Normal or non-critical lab and imaging results will be communicated to you by MyChart, letter or phone within 4 business days after the clinic has received the results. If you do not hear from us within 7 days, please contact the clinic through MyChart or phone. If you have a critical or abnormal lab result, we will notify you by phone as soon as possible.  Submit refill requests through [employer] or call your pharmacy and they will forward the refill request to us. Please allow 3 business days for your refill to be completed.          Additional Information About Your Visit        MyChart Information     [employer] gives you secure access to your electronic health record. If you see a primary care provider, you can also send messages to your care team and make appointments. If you have questions, please call your primary care clinic.  If you do not have a primary care provider, please call [phone] and they will assist you.        Care EveryWhere ID     This is your Care EveryWhere ID. This could be used by other organizations to access your [facility] medical records  [id number]        Your Vitals Were     Pulse Last Period BMI (Body  Mass Index)             76 11/15/2017 37.08 kg/m2          Blood Pressure from Last 3 Encounters:   11/30/17 130/90   11/17/17 135/89   11/07/17 132/80    Weight from Last 3 Encounters:   11/30/17 236 lb 12 oz (107.4 kg)   11/17/17 238 lb (108 kg)   11/07/17 242 lb 8 oz (110 kg)              We Performed the Following     Beta HCG qual IFA urine     COLP CERVIX/UPPER VAGINA W BX CERVIX/ENDOCERV CURETT     Surgical pathology exam        Primary Care Provider Office Phone # Fax #    Letty GILBERT Oneill PA-C [phone]       [address]  [facility] [address]        Equal Access to Services     ERIC VALENTIN : Hadii catrachita lerma hadasho Sodakshaali, waaxda luqadaha, qaybta kaalmada adeegyada, waxeve avila . So [facility] [phone].    ATENCIÓN: Si habla español, tiene a rivers disposición servicios gratuitos de asistencia lingüística. Ll[facility] [phone].    We comply with applicable federal civil rights laws and [place] laws. We do not discriminate on the basis of race, color, national origin, age, disability, sex, sexual orientation, or gender identity.            Thank you!     Thank you for choosing [facility]  for your care. Our goal is always to provide you with excellent care. Hearing back from our patients is one way we can continue to improve our services. Please take a few minutes to complete the written survey that you may receive in the mail after your visit with us. Thank you!             Your Updated Medication List - Protect others around you: Learn how to safely use, store and throw away your medicines at www.disposemymeds.org.          This list is accurate as of: 11/30/17  2:00 PM.  Always use your most recent med list.                   Brand Name Dispense Instructions for use Diagnosis    LATUDA PO      Take 40 mg by mouth daily        naltrexone-bupropion 8-90 MG per 12 hr tablet    CONTRAVE    60 tablet    Take 1 tablet in the  morning for 1 week, then increase to 1 tablet in morning and 1 in afternoon.    Class 2 obesity due to excess calories without serious comorbidity with body mass index (BMI) of 39.0 to 39.9 in adult, Anxiety state, Mild episode of recurrent major depressive disorder (H)       phentermine 37.5 MG capsule     30 capsule    Take 1 capsule (37.5 mg) by mouth every morning    Class 2 obesity due to excess calories without serious comorbidity with body mass index (BMI) of 39.0 to 39.9 in adult       Phentermine-Topiramate 7.5-46 MG Cp24     30 capsule    Take 1 tablet by mouth daily    Class 2 obesity due to excess calories without serious comorbidity with body mass index (BMI) of 39.0 to 39.9 in adult       XANAX PO      Take by mouth every 4 hours as needed for anxiety        ZOLOFT PO      Take 100 mg by mouth daily

## 2017-11-30 NOTE — NURSING NOTE
"Chief Complaint   Patient presents with     Colposcopy       Initial /90 (BP Location: Left arm, Patient Position: Chair, Cuff Size: Adult Regular)  Pulse 76  Wt 236 lb 12 oz (107.4 kg)  LMP 11/15/2017  BMI 37.08 kg/m2 Estimated body mass index is 37.08 kg/(m^2) as calculated from the following:    Height as of 17: 5' 7\" (1.702 m).    Weight as of this encounter: 236 lb 12 oz (107.4 kg).  BP completed using cuff size: regular        The following HM Due: NONE      The following patient reported/Care Every where data was sent to:  P ABSTRACT QUALITY INITIATIVES [[id number]]       N/a    Ting Wilson CMA  2017           "

## 2017-12-06 LAB — COPATH REPORT: NORMAL

## 2017-12-07 ENCOUNTER — TELEPHONE (OUTPATIENT)
Dept: OBGYN | Facility: OTHER | Age: 42
End: 2017-12-07

## 2017-12-07 NOTE — TELEPHONE ENCOUNTER
Called patient and discussed results.  Discussed LEEP now versus repeat Pap in Nov 18.  Patient elects the latter.  All questions answered and she is comfortable with plan.

## 2017-12-13 ENCOUNTER — TELEPHONE (OUTPATIENT)
Dept: FAMILY MEDICINE | Facility: OTHER | Age: 42
End: 2017-12-13

## 2017-12-13 NOTE — TELEPHONE ENCOUNTER
Left message for patient to call back. Please ask which labs she needs sent over. There are labs dated 11/30, 11/07, 11/06.  Vandana Dumont, [facility]

## 2017-12-13 NOTE — TELEPHONE ENCOUNTER
Reason for Call:  Request for results:    Name of test or procedure: labs    Date of test of procedure: today    Location of the test or procedure: [place]    OK to leave the result message on voice mail or with a family member? YES    Phone number Patient can be reached at:  Home number on file [phone] (home)    Additional comments: please send her results when they are in to [facility] fax [phone] per patient    Call taken on 12/13/2017 at 11:10 AM by Rajwinder Durán

## 2017-12-13 NOTE — TELEPHONE ENCOUNTER
Patient said we can send over all labs except the ones related to her pap/urine results. She is particularly looking for the cholesterol type of lab work.

## 2017-12-27 ENCOUNTER — TELEPHONE (OUTPATIENT)
Dept: FAMILY MEDICINE | Facility: OTHER | Age: 42
End: 2017-12-27

## 2017-12-27 DIAGNOSIS — E66.09 CLASS 2 OBESITY DUE TO EXCESS CALORIES WITHOUT SERIOUS COMORBIDITY WITH BODY MASS INDEX (BMI) OF 39.0 TO 39.9 IN ADULT: ICD-10-CM

## 2017-12-27 DIAGNOSIS — E66.812 CLASS 2 OBESITY DUE TO EXCESS CALORIES WITHOUT SERIOUS COMORBIDITY WITH BODY MASS INDEX (BMI) OF 39.0 TO 39.9 IN ADULT: ICD-10-CM

## 2017-12-27 NOTE — TELEPHONE ENCOUNTER
Spoke to our pharmacy and the phetermine 7.5 46 not covered. Patient had filled the plain phentermine on 11/27/17 at [facility] per Katheryn looking up in data base.

## 2017-12-27 NOTE — TELEPHONE ENCOUNTER
Reason for Call:  Medication or medication refill:    Do you use a [facility]?  Name of the pharmacy and phone number for the current request:  [facility] - [phone]    Name of the medication requested: Phentermine-Topiramate 7.5-46 MG     Other request: Patient is all out of medication;  asking for a call to let her know medication has been refilled. She has an appointment scheduled for next week.    Can we leave a detailed message on this number? YES    Phone number patient can be reached at: Home number on file [phone] (home)    Best Time: any    Call taken on 12/27/2017 at 12:48 PM by Katya Jackson

## 2017-12-28 RX ORDER — PHENTERMINE HYDROCHLORIDE 37.5 MG/1
37.5 CAPSULE ORAL EVERY MORNING
Qty: 30 CAPSULE | Refills: 0 | Status: SHIPPED | OUTPATIENT
Start: 2017-12-28 | End: 2018-01-05

## 2017-12-28 NOTE — TELEPHONE ENCOUNTER
RX for phentermine 37.5 mg signed and placed in MA task.     Bassem Oneill PA-C  [facility]

## 2017-12-28 NOTE — TELEPHONE ENCOUNTER
Pharmacy called and stated that pt wants rx sent to [facility] in [place]. Called and gave verbal to [facility] for 1x refill of phentermine 37.5 mg.   Called pt and let her know rx was sent over to [facility] pharmacy in [place].    Winter White, CMA

## 2017-12-29 NOTE — PROGRESS NOTES
SUBJECTIVE:                                                    Andree Kennedy is a 42 year old female who presents to clinic today for the following health issues:      HPI    Hypertension Follow-up      Outpatient blood pressures are not being checked.    Low Salt Diet: low salt    Depression and Anxiety Follow-Up    Status since last visit: No change    Other associated symptoms:None    Complicating factors:     Significant life event: No     Current substance abuse: None    - See doctor weekly       Medication Followup of Phentermine       Taking Medication as prescribed: yes    Side Effects:  None    Medication Helping Symptoms:  yes    - Just started on her 2nd month on 12/28/17   - Going well   - Almost 10 lbs weight loss   - [facility]              Answers for HPI/ROS submitted by the patient on 1/5/2018   MICA 7 TOTAL SCORE: 21  If you checked off any problems, how difficult have these problems made it for you to do your work, take care of things at home, or get along with other people?: Extremely difficult  PHQ9 TOTAL SCORE: 25    PHQ-9 Score and MyChart F/U Questions 11/6/2017 11/7/2017 1/5/2018   Total Score 25 25 25   Q9: Suicide Ideation Several days Several days Several days   F/U: Thoughts of suicide or self harm? No - No   F/U: Safety concerns for self or others? No - No     MICA-7 SCORE 6/1/2016 11/7/2017 1/5/2018   Total Score - - -   Total Score - - 21 (severe anxiety)   Total Score 21 21 21     In the past two weeks have you had thoughts of suicide or self-harm?  No.    Do you have concerns about your personal safety or the safety of others?   No    Problem list and histories reviewed & adjusted, as indicated.  Additional history: as documented    ROS:  Constitutional, HEENT, cardiovascular, pulmonary, gi and gu systems are negative, except as otherwise noted.      OBJECTIVE:   BP (!) 138/98 (BP Location: Right arm, Patient Position: Chair, Cuff Size: Adult  "Regular)  Pulse 76  Temp 98  F (36.7  C) (Oral)  Resp 12  Ht 5' 6.77\" (1.696 m)  Wt 229 lb (103.9 kg)  SpO2 99%  BMI 36.11 kg/m2  Body mass index is 36.11 kg/(m^2).  GENERAL APPEARANCE: healthy, alert and no distress  EYES: Eyes grossly normal to inspection, PERRLA, conjunctivae and sclerae without injection or discharge, EOM intact   MS: No musculoskeletal defects are noted and gait is age appropriate without ataxia   SKIN: No suspicious lesions or rashes, hydration status appears adeuqate with normal skin turgor   PSYCH: Alert and oriented x3; speech- coherent , normal rate and volume; able to articulate logical thoughts, able to abstract reason, no tangential thoughts, no hallucinations or delusions, mentation appears normal, Mood is euthymic. Affect is appropriate for this mood state and bright. Thought content is free of suicidal ideation, hallucinations, and delusions. Dress is adequate and upkept. Eye contact is good during conversation.       Diagnostic Test Results:  none     ASSESSMENT/PLAN:       ICD-10-CM    1. HTN, goal below 140/90 I10    2. Class 2 obesity due to excess calories without serious comorbidity with body mass index (BMI) of 39.0 to 39.9 in adult E66.09 phentermine 37.5 MG capsule    Z68.39    3. Mild episode of recurrent major depressive disorder (H) F33.0    4. Bipolar I disorder (H) F31.9    5. Seasonal affective disorder (H) F33.9      1. HTN   - Continues to fluctuate but does come down, not on medications at this point   - Discussed likely due to phentermine   - Patient to continue to monitor   - Recheck monthly     2. Obesity   - Just started 2 month of phentermine, doing well with ~10 lbs weight loss   - Will continue for a total of 3 months and then try to find medication to maintain   - Recheck monthly   - Continue with plan to pursue gastric bypass     3-5. Mood   - Follows with psychiatry and counseling weekly, not managed by me   - Did assure no SI or plan due to PHQ9 " scores, patient denies both     The patient indicates understanding of these issues and agrees with the plan.    Follow up: 1 month       Letty Oneill PA-C  [facility]

## 2018-01-05 ENCOUNTER — OFFICE VISIT (OUTPATIENT)
Dept: FAMILY MEDICINE | Facility: OTHER | Age: 43
End: 2018-01-05
Payer: COMMERCIAL

## 2018-01-05 VITALS
OXYGEN SATURATION: 99 % | DIASTOLIC BLOOD PRESSURE: 86 MMHG | HEART RATE: 76 BPM | RESPIRATION RATE: 12 BRPM | HEIGHT: 67 IN | TEMPERATURE: 98 F | BODY MASS INDEX: 35.94 KG/M2 | SYSTOLIC BLOOD PRESSURE: 124 MMHG | WEIGHT: 229 LBS

## 2018-01-05 DIAGNOSIS — F33.0 MILD EPISODE OF RECURRENT MAJOR DEPRESSIVE DISORDER (H): ICD-10-CM

## 2018-01-05 DIAGNOSIS — E66.812 CLASS 2 OBESITY DUE TO EXCESS CALORIES WITHOUT SERIOUS COMORBIDITY WITH BODY MASS INDEX (BMI) OF 39.0 TO 39.9 IN ADULT: ICD-10-CM

## 2018-01-05 DIAGNOSIS — F31.9 BIPOLAR I DISORDER (H): ICD-10-CM

## 2018-01-05 DIAGNOSIS — F33.8 SEASONAL AFFECTIVE DISORDER (H): ICD-10-CM

## 2018-01-05 DIAGNOSIS — I10 HTN, GOAL BELOW 140/90: Primary | ICD-10-CM

## 2018-01-05 DIAGNOSIS — E66.09 CLASS 2 OBESITY DUE TO EXCESS CALORIES WITHOUT SERIOUS COMORBIDITY WITH BODY MASS INDEX (BMI) OF 39.0 TO 39.9 IN ADULT: ICD-10-CM

## 2018-01-05 PROCEDURE — 99214 OFFICE O/P EST MOD 30 MIN: CPT | Performed by: PHYSICIAN ASSISTANT

## 2018-01-05 RX ORDER — PHENTERMINE HYDROCHLORIDE 37.5 MG/1
37.5 CAPSULE ORAL EVERY MORNING
Qty: 30 CAPSULE | Refills: 0 | Status: SHIPPED | OUTPATIENT
Start: 2018-01-25 | End: 2018-02-08

## 2018-01-05 ASSESSMENT — ANXIETY QUESTIONNAIRES
GAD7 TOTAL SCORE: 21
1. FEELING NERVOUS, ANXIOUS, OR ON EDGE: NEARLY EVERY DAY
7. FEELING AFRAID AS IF SOMETHING AWFUL MIGHT HAPPEN: NEARLY EVERY DAY
2. NOT BEING ABLE TO STOP OR CONTROL WORRYING: NEARLY EVERY DAY
4. TROUBLE RELAXING: NEARLY EVERY DAY
3. WORRYING TOO MUCH ABOUT DIFFERENT THINGS: NEARLY EVERY DAY
GAD7 TOTAL SCORE: 21
GAD7 TOTAL SCORE: 21
7. FEELING AFRAID AS IF SOMETHING AWFUL MIGHT HAPPEN: NEARLY EVERY DAY
6. BECOMING EASILY ANNOYED OR IRRITABLE: NEARLY EVERY DAY
5. BEING SO RESTLESS THAT IT IS HARD TO SIT STILL: NEARLY EVERY DAY

## 2018-01-05 NOTE — MR AVS SNAPSHOT
After Visit Summary   1/5/2018    Andree Kennedy    MRN: [id number]           Patient Information     Date Of Birth          1975        Visit Information        Provider Department      1/5/2018 11:00 AM Letty Oneill PA-C [facility]        Today's Diagnoses     HTN, goal below 140/90    -  1    Class 2 obesity due to excess calories without serious comorbidity with body mass index (BMI) of 39.0 to 39.9 in adult        Mild episode of recurrent major depressive disorder (H)        Bipolar I disorder (H)        Seasonal affective disorder (H)          Care Instructions    - Recheck 1 month               Follow-ups after your visit        Who to contact     If you have questions or need follow up information about today's clinic visit or your schedule please contact [facility] directly at [phone].  Normal or non-critical lab and imaging results will be communicated to you by [employer], letter or phone within 4 business days after the clinic has received the results. If you do not hear from us within 7 days, please contact the clinic through [employer] or phone. If you have a critical or abnormal lab result, we will notify you by phone as soon as possible.  Submit refill requests through [email or web address] or call your pharmacy and they will forward the refill request to us. Please allow 3 business days for your refill to be completed.          Additional Information About Your Visit        MyChart Information     [email or web address] gives you secure access to your electronic health record. If you see a primary care provider, you can also send messages to your care team and make appointments. If you have questions, please call your primary care clinic.  If you do not have a primary care provider, please call [phone] and they will assist you.        Care EveryWhere ID     This is your Care EveryWhere ID. This could be used by other organizations to access your  "[place] medical records  [id number]        Your Vitals Were     Pulse Temperature Respirations Height Pulse Oximetry BMI (Body Mass Index)    76 98  F (36.7  C) (Oral) 12 5' 6.77\" (1.696 m) 99% 36.11 kg/m2       Blood Pressure from Last 3 Encounters:   01/05/18 124/86   11/30/17 130/90   11/17/17 135/89    Weight from Last 3 Encounters:   01/05/18 229 lb (103.9 kg)   11/30/17 236 lb 12 oz (107.4 kg)   11/17/17 238 lb (108 kg)              Today, you had the following     No orders found for display         Today's Medication Changes          These changes are accurate as of: 1/5/18 11:25 AM.  If you have any questions, ask your nurse or doctor.               Stop taking these medicines if you haven't already. Please contact your care team if you have questions.     naltrexone-bupropion 8-90 MG per 12 hr tablet   Commonly known as:  CONTRAVE   Stopped by:  Letty Oneill PA-C           Phentermine-Topiramate 7.5-46 MG Cp24   Stopped by:  Letty Oneill PA-C                Where to get your medicines      Some of these will need a paper prescription and others can be bought over the counter.  Ask your nurse if you have questions.     Bring a paper prescription for each of these medications     phentermine 37.5 MG capsule                Primary Care Provider Office Phone # Fax #    Letty Oneill PA-C [phone]       [address] [place] [id number]        Equal Access to Services     [facility] AH: Hadii aad ku hadasho Soomaali, waaxda luqadaha, qaybta kaalmada adeegyada, ana bergman. So [facility] [phone].    ATENCIÓN: Si habla español, tiene a rivers disposición servicios gratuitos de asistencia lingüística. Llame al [phone].    We comply with applicable federal civil rights laws and [place] laws. We do not discriminate on the basis of race, color, national origin, age, disability, sex, sexual orientation, " or gender identity.            Thank you!     Thank you for choosing [facility]  for your care. Our goal is always to provide you with excellent care. Hearing back from our patients is one way we can continue to improve our services. Please take a few minutes to complete the written survey that you may receive in the mail after your visit with us. Thank you!             Your Updated Medication List - Protect others around you: Learn how to safely use, store and throw away your medicines at www.disposemymeds.org.          This list is accurate as of: 1/5/18 11:25 AM.  Always use your most recent med list.                   Brand Name Dispense Instructions for use Diagnosis    LATUDA PO      Take 40 mg by mouth daily        phentermine 37.5 MG capsule   Start taking on:  1/25/2018     30 capsule    Take 1 capsule (37.5 mg) by mouth every morning    Class 2 obesity due to excess calories without serious comorbidity with body mass index (BMI) of 39.0 to 39.9 in adult       XANAX PO      Take by mouth every 4 hours as needed for anxiety        ZOLOFT PO      Take 100 mg by mouth daily

## 2018-01-05 NOTE — NURSING NOTE
"Chief Complaint   Patient presents with     Hypertension     Recheck Medication     Health Maintenance     kristie, phq9       Initial BP (!) 138/98 (BP Location: Right arm, Patient Position: Chair, Cuff Size: Adult Regular)  Pulse 76  Temp 98  F (36.7  C) (Oral)  Resp 12  Ht 5' 6.77\" (1.696 m)  Wt 229 lb (103.9 kg)  SpO2 99%  BMI 36.11 kg/m2 Estimated body mass index is 36.11 kg/(m^2) as calculated from the following:    Height as of this encounter: 5' 6.77\" (1.696 m).    Weight as of this encounter: 229 lb (103.9 kg).  Medication Reconciliation: complete  "

## 2018-01-06 ASSESSMENT — ANXIETY QUESTIONNAIRES: GAD7 TOTAL SCORE: 21

## 2018-01-06 ASSESSMENT — PATIENT HEALTH QUESTIONNAIRE - PHQ9: SUM OF ALL RESPONSES TO PHQ QUESTIONS 1-9: 25

## 2018-01-22 ENCOUNTER — OFFICE VISIT (OUTPATIENT)
Dept: FAMILY MEDICINE | Facility: OTHER | Age: 43
End: 2018-01-22
Payer: COMMERCIAL

## 2018-01-22 VITALS
HEART RATE: 68 BPM | TEMPERATURE: 98 F | DIASTOLIC BLOOD PRESSURE: 70 MMHG | BODY MASS INDEX: 35.8 KG/M2 | SYSTOLIC BLOOD PRESSURE: 128 MMHG | RESPIRATION RATE: 16 BRPM | WEIGHT: 227 LBS

## 2018-01-22 DIAGNOSIS — J00 COMMON COLD: ICD-10-CM

## 2018-01-22 DIAGNOSIS — R07.0 THROAT PAIN: Primary | ICD-10-CM

## 2018-01-22 LAB
DEPRECATED S PYO AG THROAT QL EIA: NORMAL
SPECIMEN SOURCE: NORMAL

## 2018-01-22 PROCEDURE — 87081 CULTURE SCREEN ONLY: CPT | Performed by: NURSE PRACTITIONER

## 2018-01-22 PROCEDURE — 87880 STREP A ASSAY W/OPTIC: CPT | Performed by: NURSE PRACTITIONER

## 2018-01-22 PROCEDURE — 99213 OFFICE O/P EST LOW 20 MIN: CPT | Performed by: NURSE PRACTITIONER

## 2018-01-22 NOTE — PROGRESS NOTES
SUBJECTIVE:                                                    Andree Kennedy is a 42 year old female who presents to clinic today for the following health issues:      HPI    Acute Illness   Acute illness concerns: sore throat and sinuses  Onset: yesterday     Fever: no     Chills/Sweats: no     Headache (location?): YES    Sinus Pressure:YES    Conjunctivitis:  no    Ear Pain: no    Rhinorrhea: no     Congestion: YES    Sore Throat: YES- just starting      Cough: no    Wheeze: no    Decreased Appetite: no    Nausea: no    Vomiting: no    Diarrhea:  no    Dysuria/Freq.: no    Fatigue/Achiness: YES    Sick/Strep Exposure: no     Therapies Tried and outcome: none     Throat pain worse this am. Hurts to drink water. Denies fever.     Problem list and histories reviewed & adjusted, as indicated.  Additional history: as documented    BP Readings from Last 3 Encounters:   01/22/18 128/70   01/05/18 124/86   11/30/17 130/90    Wt Readings from Last 3 Encounters:   01/22/18 227 lb (103 kg)   01/05/18 229 lb (103.9 kg)   11/30/17 236 lb 12 oz (107.4 kg)                  Labs reviewed in EPIC      ROS:  Constitutional, HEENT, cardiovascular, pulmonary, gi and gu systems are negative, except as otherwise noted.      OBJECTIVE:   /70 (BP Location: Right arm, Patient Position: Chair, Cuff Size: Adult Large)  Pulse 68  Temp 98  F (36.7  C) (Oral)  Resp 16  Wt 227 lb (103 kg)  BMI 35.8 kg/m2  Body mass index is 35.8 kg/(m^2).  GENERAL: healthy, alert and no distress  EYES: Eyes grossly normal to inspection, PERRL and conjunctivae and sclerae normal  HENT: normal cephalic/atraumatic, ear canals and TM's normal, nose and mouth without ulcers or lesions, nasal mucosa edematous , oropharynx clear, oral mucous membranes moist and tonsillar erythema  NECK: bilateral anterior cervical adenopathy, no asymmetry, masses, or scars and thyroid normal to palpation  RESP: lungs clear to auscultation - no rales, rhonchi or  wheezes  CV: regular rate and rhythm, normal S1 S2, no S3 or S4, no murmur, click or rub, no peripheral edema and peripheral pulses strong  ABDOMEN: soft, nontender, no hepatosplenomegaly, no masses and bowel sounds normal  MS: no gross musculoskeletal defects noted, no edema    Diagnostic Test Results:  Results for orders placed or performed in visit on 01/22/18 (from the past 24 hour(s))   Strep, Rapid Screen   Result Value Ref Range    Specimen Description Throat     Rapid Strep A Screen       NEGATIVE: No Group A streptococcal antigen detected by immunoassay, await culture report.       ASSESSMENT/PLAN:       1. Throat pain  Due to common cold viral infection  - Strep, Rapid Screen  - Beta strep group A culture    2. Common cold  Home instructions reviewed.       Patient Instructions   Reassured that this appears to be a viral infection.  Treat symptoms with warm salt water gargles, throat lozenges, and over the counter pain, such as tylenol and ibuprofen, as needed.    Follow-up with primary care provider if not improving      Thank you  Annamarie De La Cruz [facility]

## 2018-01-22 NOTE — MR AVS SNAPSHOT
After Visit Summary   1/22/2018    Andree Kennedy    MRN: [id number]           Patient Information     Date Of Birth          1975        Visit Information        Provider Department      1/22/2018 12:30 PM Annamarie De La Cruz APRN CNP [facility]        Today's Diagnoses     Throat pain    -  1      Care Instructions    Reassured that this appears to be a viral infection.  Treat symptoms with warm salt water gargles, throat lozenges, and over the counter pain, such as tylenol and ibuprofen, as needed.    Follow-up with primary care provider if not improving      Thank you  Annamarie De La Cruz CNP            Follow-ups after your visit        Your next 10 appointments already scheduled     Feb 08, 2018  9:45 AM CST   Office Visit with Letty Oneill PA-C   [facility] ([facility]    [address] [place] [id number] [phone]           Bring a current list of meds and any records pertaining to this visit. For Physicals, please bring immunization records and any forms needing to be filled out. Please arrive 10 minutes early to complete paperwork.              Who to contact     If you have questions or need follow up information about today's clinic visit or your schedule please contact [facility] directly at [phone].  Normal or non-critical lab and imaging results will be communicated to you by MyChart, letter or phone within 4 business days after the clinic has received the results. If you do not hear from us within 7 days, please contact the clinic through MyChart or phone. If you have a critical or abnormal lab result, we will notify you by phone as soon as possible.  Submit refill requests through [employer] or call your pharmacy and they will forward the refill request to us. Please allow 3 business days for your refill to be completed.          Additional Information About Your Visit         Trapit Information     [employer] gives you secure access to your electronic health record. If you see a primary care provider, you can also send messages to your care team and make appointments. If you have questions, please call your primary care clinic.  If you do not have a primary care provider, please call [phone] and they will assist you.        Care EveryWhere ID     This is your Care EveryWhere ID. This could be used by other organizations to access your [place] medical records  [id number]        Your Vitals Were     Pulse Temperature Respirations BMI (Body Mass Index)          68 98  F (36.7  C) (Oral) 16 35.8 kg/m2         Blood Pressure from Last 3 Encounters:   01/22/18 128/70   01/05/18 124/86   11/30/17 130/90    Weight from Last 3 Encounters:   01/22/18 227 lb (103 kg)   01/05/18 229 lb (103.9 kg)   11/30/17 236 lb 12 oz (107.4 kg)              We Performed the Following     Beta strep group A culture     Strep, Rapid Screen        Primary Care Provider Office Phone # Fax #    Letty GILBERT Oneill PA-C [phone]       [address] [facility] [address]  [facility] [address]        Equal Access to Services     ERIC VALENTIN : Hadii aad ku hadasho Soomaali, waaxda luqadaha, qaybta kaalmada adeegyada, ana leighin hayjoen parris avila . So [facility] [phone].    ATENCIÓN: Si habla español, tiene a rivers disposición servicios grat"[employer]os de asistencia lingüística. Llame al [phone].    We comply with applicable federal civil rights laws and [place] laws. We do not discriminate on the basis of race, color, national origin, age, disability, sex, sexual orientation, or gender identity.            Thank you!     Thank you for choosing [facility]  for your care. Our goal is always to provide you with excellent care. Hearing back from our patients is one way we can continue to improve our services. Please take a few minutes to complete the written survey that you may  receive in the mail after your visit with us. Thank you!             Your Updated Medication List - Protect others around you: Learn how to safely use, store and throw away your medicines at www.disposemymeds.org.          This list is accurate as of: 1/22/18  1:10 PM.  Always use your most recent med list.                   Brand Name Dispense Instructions for use Diagnosis    LATUDA PO      Take 40 mg by mouth daily        phentermine 37.5 MG capsule   Start taking on:  1/25/2018     30 capsule    Take 1 capsule (37.5 mg) by mouth every morning    Class 2 obesity due to excess calories without serious comorbidity with body mass index (BMI) of 39.0 to 39.9 in adult       XANAX PO      Take by mouth every 4 hours as needed for anxiety        ZOLOFT PO      Take 100 mg by mouth daily

## 2018-01-22 NOTE — NURSING NOTE
"No chief complaint on file.      Initial /70 (BP Location: Right arm, Patient Position: Chair, Cuff Size: Adult Large)  Pulse 68  Temp 98  F (36.7  C) (Oral)  Resp 16  Wt 227 lb (103 kg)  BMI 35.8 kg/m2 Estimated body mass index is 35.8 kg/(m^2) as calculated from the following:    Height as of 1/5/18: 5' 6.77\" (1.696 m).    Weight as of this encounter: 227 lb (103 kg).  Medication Reconciliation: complete    "

## 2018-01-22 NOTE — PATIENT INSTRUCTIONS
Reassured that this appears to be a viral infection.  Treat symptoms with warm salt water gargles, throat lozenges, and over the counter pain, such as tylenol and ibuprofen, as needed.    Follow-up with primary care provider if not improving      Thank you  Annamarie De La Cruz CNP

## 2018-01-22 NOTE — PROGRESS NOTES
Results discussed with patient in clinic. States understanding of these results.    Annamarie De La Cruz CNP

## 2018-01-23 LAB
BACTERIA SPEC CULT: NORMAL
SPECIMEN SOURCE: NORMAL

## 2018-02-01 NOTE — PROGRESS NOTES
SUBJECTIVE:   Andree Kennedy is a 42 year old female who presents to clinic today for the following health issues:    Refill on phentermine, before vacation.    Medication Followup of Phentermine    Taking Medication as prescribed: NO-stopped while sick for 2 weeks     Side Effects:  None    Medication Helping Symptoms:  Yes    - Doesn't think filled 1/25/18 rx          History of Present Illness     Hypertension:     Outpatient blood pressures:  Are not being checked    Dietary sodium intake::  Low salt diet      Depression and Anxiety Follow-Up    Status since last visit: No change    Other associated symptoms:None    Complicating factors:     Significant life event: No     Current substance abuse: None      PHQ-9 11/7/2017 1/5/2018 2/8/2018   Total Score 25 25 25   Q9: Suicide Ideation Several days Several days Several days     MICA-7 SCORE 11/7/2017 1/5/2018 2/8/2018   Total Score - - -   Total Score - 21 (severe anxiety) 21 (severe anxiety)   Total Score 21 21 21     In the past two weeks have you had thoughts of suicide or self-harm?  No.    Do you have concerns about your personal safety or the safety of others?   No        Plan from last visit 1/5/18  1. HTN   - Continues to fluctuate but does come down, not on medications at this point   - Discussed likely due to phentermine   - Patient to continue to monitor   - Recheck monthly      2. Obesity   - Just started 2 month of phentermine, doing well with ~10 lbs weight loss   - Will continue for a total of 3 months and then try to find medication to maintain   - Recheck monthly   - Continue with plan to pursue gastric bypass      3-5. Mood   - Follows with psychiatry and counseling weekly, not managed by me   - Did assure no SI or plan due to PHQ9 scores, patient denies both           Problem list and histories reviewed & adjusted, as indicated.  Additional history: as documented      BP Readings from Last 3 Encounters:   02/08/18 130/86   01/22/18 128/70    01/05/18 124/86    Wt Readings from Last 3 Encounters:   02/08/18 235 lb 9.6 oz (106.9 kg)   01/22/18 227 lb (103 kg)   01/05/18 229 lb (103.9 kg)            Labs reviewed in EPIC    ROS:  Constitutional, HEENT, cardiovascular, pulmonary, gi and gu systems are negative, except as otherwise noted.    OBJECTIVE:   /86  Pulse 66  Temp 97.5  F (36.4  C) (Oral)  Resp 14  Wt 235 lb 9.6 oz (106.9 kg)  SpO2 98%  BMI 37.15 kg/m2  Body mass index is 37.15 kg/(m^2).  GENERAL APPEARANCE: healthy, alert and no distress  EYES: Eyes grossly normal to inspection, PERRLA, conjunctivae and sclerae without injection or discharge, EOM intact   MS: No musculoskeletal defects are noted and gait is age appropriate without ataxia   SKIN: No suspicious lesions or rashes, hydration status appears adeuqate with normal skin turgor   PSYCH: Alert and oriented x3; speech- coherent , normal rate and volume; able to articulate logical thoughts, able to abstract reason, no tangential thoughts, no hallucinations or delusions, mentation appears normal, Mood is euthymic. Affect is appropriate for this mood state and bright. Thought content is free of suicidal ideation, hallucinations, and delusions. Dress is adequate and upkept. Eye contact is good during conversation.       Diagnostic Test Results:  none     ASSESSMENT/PLAN:       ICD-10-CM    1. HTN, goal below 140/90 I10    2. Class 2 obesity due to excess calories without serious comorbidity with body mass index (BMI) of 39.0 to 39.9 in adult E66.09 phentermine 37.5 MG capsule    Z68.39    3. Anxiety state F41.1    4. Bipolar I disorder (H) F31.9    5. Mild episode of recurrent major depressive disorder (H) F33.0      Plan from last visit 1/5/18  1. HTN   - Continues to fluctuate but does come down, not on medications at this point   - Discussed likely due to phentermine   - Patient to continue to monitor   - Recheck monthly      2. Obesity   - 2 month of phentermine done, doing well  with ~10 lbs weight loss      Filled 11/28 and 12/28 per [place] , been off due to acute illness, refilled today      Reviewed use and side effects   - Will continue for a total of 3 months and then try to find long term medication to maintain      Gave list of these for patient to check with insurance for coverage  - Recheck 1 month   - Continue with plan to pursue gastric bypass if we can't get any further with this       3-5. Mood   - Follows with psychiatry and counseling weekly, not managed by me   - Did assure no SI or plan due to PHQ9 scores, patient denies both     The patient indicates understanding of these issues and agrees with the plan.    Follow up: 1 month         Letty Oneill PA-C  [facility]

## 2018-02-08 ENCOUNTER — OFFICE VISIT (OUTPATIENT)
Dept: FAMILY MEDICINE | Facility: OTHER | Age: 43
End: 2018-02-08
Payer: COMMERCIAL

## 2018-02-08 VITALS
TEMPERATURE: 97.5 F | SYSTOLIC BLOOD PRESSURE: 130 MMHG | OXYGEN SATURATION: 98 % | RESPIRATION RATE: 14 BRPM | DIASTOLIC BLOOD PRESSURE: 86 MMHG | WEIGHT: 235.6 LBS | BODY MASS INDEX: 37.15 KG/M2 | HEART RATE: 66 BPM

## 2018-02-08 DIAGNOSIS — E66.09 CLASS 2 OBESITY DUE TO EXCESS CALORIES WITHOUT SERIOUS COMORBIDITY WITH BODY MASS INDEX (BMI) OF 39.0 TO 39.9 IN ADULT: ICD-10-CM

## 2018-02-08 DIAGNOSIS — I10 HTN, GOAL BELOW 140/90: Primary | ICD-10-CM

## 2018-02-08 DIAGNOSIS — F33.0 MILD EPISODE OF RECURRENT MAJOR DEPRESSIVE DISORDER (H): ICD-10-CM

## 2018-02-08 DIAGNOSIS — F41.1 ANXIETY STATE: ICD-10-CM

## 2018-02-08 DIAGNOSIS — F31.9 BIPOLAR I DISORDER (H): ICD-10-CM

## 2018-02-08 DIAGNOSIS — E66.812 CLASS 2 OBESITY DUE TO EXCESS CALORIES WITHOUT SERIOUS COMORBIDITY WITH BODY MASS INDEX (BMI) OF 39.0 TO 39.9 IN ADULT: ICD-10-CM

## 2018-02-08 PROCEDURE — 99214 OFFICE O/P EST MOD 30 MIN: CPT | Performed by: PHYSICIAN ASSISTANT

## 2018-02-08 RX ORDER — PHENTERMINE HYDROCHLORIDE 37.5 MG/1
37.5 CAPSULE ORAL EVERY MORNING
Qty: 30 CAPSULE | Refills: 0 | Status: SHIPPED | OUTPATIENT
Start: 2018-02-08 | End: 2018-06-06

## 2018-02-08 ASSESSMENT — ANXIETY QUESTIONNAIRES
GAD7 TOTAL SCORE: 21
GAD7 TOTAL SCORE: 21
2. NOT BEING ABLE TO STOP OR CONTROL WORRYING: NEARLY EVERY DAY
5. BEING SO RESTLESS THAT IT IS HARD TO SIT STILL: NEARLY EVERY DAY
4. TROUBLE RELAXING: NEARLY EVERY DAY
7. FEELING AFRAID AS IF SOMETHING AWFUL MIGHT HAPPEN: NEARLY EVERY DAY
6. BECOMING EASILY ANNOYED OR IRRITABLE: NEARLY EVERY DAY
3. WORRYING TOO MUCH ABOUT DIFFERENT THINGS: NEARLY EVERY DAY
GAD7 TOTAL SCORE: 21
1. FEELING NERVOUS, ANXIOUS, OR ON EDGE: NEARLY EVERY DAY
7. FEELING AFRAID AS IF SOMETHING AWFUL MIGHT HAPPEN: NEARLY EVERY DAY

## 2018-02-08 ASSESSMENT — PATIENT HEALTH QUESTIONNAIRE - PHQ9
SUM OF ALL RESPONSES TO PHQ QUESTIONS 1-9: 25
SUM OF ALL RESPONSES TO PHQ QUESTIONS 1-9: 25

## 2018-02-08 ASSESSMENT — PAIN SCALES - GENERAL: PAINLEVEL: NO PAIN (0)

## 2018-02-08 NOTE — NURSING NOTE
"Chief Complaint   Patient presents with     Hypertension     Panel Management     phq9       Initial /86  Pulse 66  Temp 97.5  F (36.4  C) (Oral)  Resp 14  Wt 235 lb 9.6 oz (106.9 kg)  SpO2 98%  BMI 37.15 kg/m2 Estimated body mass index is 37.15 kg/(m^2) as calculated from the following:    Height as of 1/5/18: 5' 6.77\" (1.696 m).    Weight as of this encounter: 235 lb 9.6 oz (106.9 kg).  Medication Reconciliation: complete    "

## 2018-02-08 NOTE — MR AVS SNAPSHOT
After Visit Summary   2/8/2018    Andree Kennedy    MRN: [id number]           Patient Information     Date Of Birth          1975        Visit Information        Provider Department      2/8/2018 9:45 AM Letty Oneill PA-C [facility]        Today's Diagnoses     HTN, goal below 140/90    -  1    Class 2 obesity due to excess calories without serious comorbidity with body mass index (BMI) of 39.0 to 39.9 in adult        Anxiety state        Bipolar I disorder (H)        Mild episode of recurrent major depressive disorder (H)          Care Instructions    - Recheck 1 month     - Orlistat, Belviq, Phentermine-Topamax, Contrave, etc.      Weight loss medications      Ask if they would take a letter of medical necessity           Follow-ups after your visit        Who to contact     If you have questions or need follow up information about today's clinic visit or your schedule please contact [facility] directly at [phone].  Normal or non-critical lab and imaging results will be communicated to you by [employer], letter or phone within 4 business days after the clinic has received the results. If you do not hear from us within 7 days, please contact the clinic through [employer] or phone. If you have a critical or abnormal lab result, we will notify you by phone as soon as possible.  Submit refill requests through [employer] or call your pharmacy and they will forward the refill request to us. Please allow 3 business days for your refill to be completed.          Additional Information About Your Visit        [employer] Information     [employer] gives you secure access to your electronic health record. If you see a primary care provider, you can also send messages to your care team and make appointments. If you have questions, please call your primary care clinic.  If you do not have a primary care provider, please call [phone] and they will assist you.         Care EveryWhere ID     This is your Care EveryWhere ID. This could be used by other organizations to access your [facility] medical records  [id number]        Your Vitals Were     Pulse Temperature Respirations Pulse Oximetry BMI (Body Mass Index)       66 97.5  F (36.4  C) (Oral) 14 98% 37.15 kg/m2        Blood Pressure from Last 3 Encounters:   02/08/18 130/86   01/22/18 128/70   01/05/18 124/86    Weight from Last 3 Encounters:   02/08/18 235 lb 9.6 oz (106.9 kg)   01/22/18 227 lb (103 kg)   01/05/18 229 lb (103.9 kg)              Today, you had the following     No orders found for display         Where to get your medicines      Some of these will need a paper prescription and others can be bought over the counter.  Ask your nurse if you have questions.     Bring a paper prescription for each of these medications     phentermine 37.5 MG capsule          Primary Care Provider Office Phone # Fax #    Letty Oneill PA-C [phone]       [address] [place] [id number]        Equal Access to Services     [facility]: Hadii catrachita ku hadasho Sodakshaali, waaxda luqadaha, qaybta kaalmada adejaquelin, ana avila . So [facility] [phone].    ATENCIÓN: Si habla español, tiene a rivers disposición servicios gratuitos de asistencia lingüística. [facility] [phone].    We comply with applicable federal civil rights laws and [place] laws. We do not discriminate on the basis of race, color, national origin, age, disability, sex, sexual orientation, or gender identity.            Thank you!     Thank you for choosing [facility]  for your care. Our goal is always to provide you with excellent care. Hearing back from our patients is one way we can continue to improve our services. Please take a few minutes to complete the written survey that you may receive in the mail after your visit with us. Thank you!             Your Updated Medication  List - Protect others around you: Learn how to safely use, store and throw away your medicines at www.disposemymeds.org.          This list is accurate as of 2/8/18 10:22 AM.  Always use your most recent med list.                   Brand Name Dispense Instructions for use Diagnosis    LATUDA PO      Take 40 mg by mouth daily        phentermine 37.5 MG capsule     30 capsule    Take 1 capsule (37.5 mg) by mouth every morning    Class 2 obesity due to excess calories without serious comorbidity with body mass index (BMI) of 39.0 to 39.9 in adult       XANAX PO      Take by mouth every 4 hours as needed for anxiety        ZOLOFT PO      Take 100 mg by mouth daily

## 2018-02-08 NOTE — PATIENT INSTRUCTIONS
- Recheck 1 month     - Orlistat, Belviq, Phentermine-Topamax, Contrave, etc.      Weight loss medications      Ask if they would take a letter of medical necessity

## 2018-02-09 ASSESSMENT — PATIENT HEALTH QUESTIONNAIRE - PHQ9: SUM OF ALL RESPONSES TO PHQ QUESTIONS 1-9: 25

## 2018-02-09 ASSESSMENT — ANXIETY QUESTIONNAIRES: GAD7 TOTAL SCORE: 21

## 2018-03-15 ENCOUNTER — TELEPHONE (OUTPATIENT)
Dept: FAMILY MEDICINE | Facility: OTHER | Age: 43
End: 2018-03-15

## 2018-03-15 DIAGNOSIS — E66.09 CLASS 2 OBESITY DUE TO EXCESS CALORIES WITHOUT SERIOUS COMORBIDITY WITH BODY MASS INDEX (BMI) OF 39.0 TO 39.9 IN ADULT: Primary | ICD-10-CM

## 2018-03-15 DIAGNOSIS — E66.812 CLASS 2 OBESITY DUE TO EXCESS CALORIES WITHOUT SERIOUS COMORBIDITY WITH BODY MASS INDEX (BMI) OF 39.0 TO 39.9 IN ADULT: Primary | ICD-10-CM

## 2018-03-15 NOTE — TELEPHONE ENCOUNTER
I reviewed CDL's note. It seems like she was trying her on phentermine for the past 3 months and there was script printed at the end of feb for it. There is no mention of any other drug that would be her preference. Have RN check  if she filled a script end of feb and I think this has to wait till CDL return to let the pt know about her preference.

## 2018-03-15 NOTE — TELEPHONE ENCOUNTER
I spoke with patient, she is currently taking Phentermine and recently had this refilled. She does not need any medication at this time. She states that she contacted her insurance company regarding the medications that her and CDL discussed at her last visit (not mentioned in her last note) and they will cover any of the medications if they are sent through [facility]. She understands CDL is out of office until Monday and is ok for her to review. She also states she is willing to return to clinic to discuss this if she needs to. Will route to CDL to review when she returns to clinic.

## 2018-03-19 NOTE — TELEPHONE ENCOUNTER
Please let patient know I have sent RX for Contrave for her to try. She should check in with me in 3-4 weeks after starting.    Bassem Oneill PA-C  [facility]

## 2018-03-26 ENCOUNTER — TELEPHONE (OUTPATIENT)
Dept: FAMILY MEDICINE | Facility: OTHER | Age: 43
End: 2018-03-26

## 2018-03-26 NOTE — TELEPHONE ENCOUNTER
Reason for Call:  Other prescription    Detailed comments: there was a prescription sent to [facility] and they told the patient that the doctor needs to call them regarding the prescription they had questions about it.    Phone Number Patient can be reached at: Home number on file [phone] (home) or Cell number on file:    Telephone Information:   Mobile [phone]       Best Time: anytime    Can we leave a detailed message on this number? YES    Call taken on 3/26/2018 at 9:41 AM by Marbella Vasquez

## 2018-03-26 NOTE — TELEPHONE ENCOUNTER
Prior Authorization Retail Medication Request    Medication/Dose: contrave  ICD code (if different than what is on RX):    Previously Tried and Failed:    Rationale:      Insurance Name:    Insurance ID:        Pharmacy Information (if different than what is on RX)  Name:    Phone:

## 2018-03-26 NOTE — TELEPHONE ENCOUNTER
Spoke to patient and confirmed this was regarding the Contrave that was prescribed 3/19/18. Called [facility], the Contrave is not covered by patients insurance company but each medication is covered separately up to a 90 day supply. If the Contrave is needed a prior auth can be started, phone number [phone]. Will route to CDL to review/advise.

## 2018-03-28 NOTE — TELEPHONE ENCOUNTER
Per insurance on CMM, medication is not covered under the members plan. Called patients insurance just to make sure medication is not covered. Per Gallito hong, medication is excluded from the members plan and will not be covered. Weight loss medications are excluded from all part D plans.

## 2018-04-25 ENCOUNTER — TELEPHONE (OUTPATIENT)
Dept: FAMILY MEDICINE | Facility: OTHER | Age: 43
End: 2018-04-25

## 2018-04-25 DIAGNOSIS — I10 HTN, GOAL BELOW 140/90: Primary | ICD-10-CM

## 2018-04-25 NOTE — TELEPHONE ENCOUNTER
Reason for Call: Request for an order or referral:    Order or referral being requested: blood pressure monitor    Date needed: as soon as possible    Has the patient been seen by the PCP for this problem? NO    Additional comments: please call to discuss order    Phone number Patient can be reached at:  Home number on file [phone] (home)    Best Time:  any    Can we leave a detailed message on this number?  YES    Call taken on 4/25/2018 at 11:31 AM by Rajwinder Durán

## 2018-04-25 NOTE — TELEPHONE ENCOUNTER
Patient states she would like this sent to [facility] because they told her they would cover it with a pa

## 2018-04-25 NOTE — TELEPHONE ENCOUNTER
Patient states [facility] has not heard about this. They are still needing a letter of medical necessity. [phone]

## 2018-04-25 NOTE — TELEPHONE ENCOUNTER
Submitted another PA and is identical with the first initial PA done by the [facility].   Called [facility] as well and confirmed drug is not covered through patient's Part D plan.  Medication is excluded.  [employer] is her medical benefits and [facility] is her pharmacy benefits. The pharmacy cannot bill the medication through her medical insurance. Confirmed that any weight loss medication is not covered by any Medicare plans.

## 2018-04-25 NOTE — TELEPHONE ENCOUNTER
Spoke to patient, she would like to monitor her blood pressure at home but can not afford to purchase a blood pressure monitor. She is asking for an order or prescription. I suggested she contact her insurance company to see what they cover and let us know specifics of how the monitor would need to be ordered. She will call back after she speaks with her insurance company.

## 2018-04-25 NOTE — TELEPHONE ENCOUNTER
Patient calling to inform that insurance will cover an automatic machine for patient, no specific brand name was given.    Fatoumata Hill  Reception/ Scheduling

## 2018-04-26 NOTE — TELEPHONE ENCOUNTER
Order signed and placed in MA task. Fax to [facility] and notify patient.    Bassem Oneill PA-C  [facility]

## 2018-04-27 ENCOUNTER — TELEPHONE (OUTPATIENT)
Dept: FAMILY MEDICINE | Facility: OTHER | Age: 43
End: 2018-04-27

## 2018-04-27 DIAGNOSIS — I10 HTN, GOAL BELOW 140/90: ICD-10-CM

## 2018-04-27 NOTE — TELEPHONE ENCOUNTER
Reason for Call:  Medication or medication refill:    Do you use a [facility]?  Name of the pharmacy and phone number for the current request:  [facility] [place] - [phone]    Name of the medication requested: blood pressure monitor    Other request: would like this sent to [facility] since [facility] does not carry them. Would like to pick it up today. Call when done.    Can we leave a detailed message on this number? YES    Phone number patient can be reached at: Home number on file [phone] (home)    Best Time: any    Call taken on 4/27/2018 at 2:19 PM by Rajwinder Durán

## 2018-04-27 NOTE — TELEPHONE ENCOUNTER
Blood Pressure Monitor  Prescription approved per [facility] Refill Protocol.  Faxed to pharmacy.   Cmamie Adams RN, BSN

## 2018-05-08 ENCOUNTER — TELEPHONE (OUTPATIENT)
Dept: FAMILY MEDICINE | Facility: OTHER | Age: 43
End: 2018-05-08

## 2018-05-08 NOTE — TELEPHONE ENCOUNTER
Reason for Call:  Other call back    Detailed comments: Patient is calling to check and see if you are still working on PA for her weight loss medication.  Her insurance company told her they have not heard from us all yet.  Please call    Phone Number Patient can be reached at: Home number on file [phone] (home)    Best Time: any    Can we leave a detailed message on this number? YES    Call taken on 5/8/2018 at 9:03 AM by Bisi Hitchcock

## 2018-05-22 ENCOUNTER — TELEPHONE (OUTPATIENT)
Dept: FAMILY MEDICINE | Facility: OTHER | Age: 43
End: 2018-05-22

## 2018-05-22 DIAGNOSIS — E66.09 CLASS 2 OBESITY DUE TO EXCESS CALORIES WITHOUT SERIOUS COMORBIDITY WITH BODY MASS INDEX (BMI) OF 39.0 TO 39.9 IN ADULT: ICD-10-CM

## 2018-05-22 DIAGNOSIS — E66.812 CLASS 2 OBESITY DUE TO EXCESS CALORIES WITHOUT SERIOUS COMORBIDITY WITH BODY MASS INDEX (BMI) OF 39.0 TO 39.9 IN ADULT: ICD-10-CM

## 2018-05-22 RX ORDER — PHENTERMINE HYDROCHLORIDE 37.5 MG/1
37.5 CAPSULE ORAL EVERY MORNING
Qty: 30 CAPSULE | Refills: 0 | OUTPATIENT
Start: 2018-05-22

## 2018-05-22 NOTE — LETTER
[facility]  [address]   [place] [address]  Phone: [phone]    June 14, 2018      RE:   Andree Kennedy  [address]  [place] [address]          To whom it may concern:    Andree Kennedy is a patient at our clinic and under my care. The patient carried the diagnosis of morbid obesity with a BMI of 36 and co-morbid hypertension and hyperlipidemia. Patient is very motivated to lose weight and has been actively working on diet and exercise for this. She has also recently pursued the idea of bariatric surgery and has attended the initial class for this. Through [facility], the road to bariatric surgery is long and requires patient to lose weight and adhere to a diet prior to surgery. In an effort to lose weight to be a good candidate, patient has did trial of Phentermine 37.5 mg and was able to lose ~14 lbs. She tolerated this with no side effects. This medication can not be used for >12 weeks as prescribed by the FDA. Therefore we have now reached a point where she can not continue on this medication and her weight has reached a plateau. It is my medical opinion that patient needs to be approved for a long term weight loss medication such as Phentermine-Topiramate (Qysmia) or Bupropion-Naltrexone (Contrave) in order to make her a good candidate for bariatric surgery, to continue weight loss, and to over all improve health due to weight loss.     Please contact me for questions or concerns.      Sincerely,            Letty Oneill PA-C

## 2018-05-22 NOTE — TELEPHONE ENCOUNTER
Requested Prescriptions   Pending Prescriptions Disp Refills     phentermine 37.5 MG capsule 30 capsule 0     Sig: Take 1 capsule (37.5 mg) by mouth every morning    There is no refill protocol information for this order

## 2018-05-22 NOTE — TELEPHONE ENCOUNTER
See telephone encounter where patient was prescribed Contrave most recently.  She should follow-up in clinic with JENNA MORRIS or wait until she is back to see if she will refill this medication.

## 2018-05-22 NOTE — TELEPHONE ENCOUNTER
LM for patient to return phone call to clinic about message below.  Hetal Parra CMA ([facility])

## 2018-05-23 NOTE — TELEPHONE ENCOUNTER
"Pt called, very frustrated. She contacted christin who told her that she does NOT need to start appeal, that [place] needs to send medical necessity letter to [facility] and then they will cover this medication. Pt has been waiting and getting the \"run around\" for a month, and she would like to have this taken care of (letter sent) today, however, I told her CDL is out, and she was wondering if Lois has time if she can look into this, and possibly contact her with more information.   "

## 2018-05-23 NOTE — TELEPHONE ENCOUNTER
Spoke to patient and gave her an update and will await for PA to be denied to start the appeal. Will contact patient when the PA is denied and patient will call [facility] to start appeal.    Lois Salguero MA

## 2018-05-23 NOTE — TELEPHONE ENCOUNTER
LM for patient to return call to clinic- to give her an update from this morning when I spoke to her. Can either transfer to me or let patient know of message below.      I spoke to patient and she is getting very frustrated with this process of trying to get the contrave approved. I did inform her the coverage denial for this medication- however I contacted [facility] and they will do an appeal to get the medication covered once they see the PA has been denied. Can we submit a PA even though the medication is excluded? And then [facility] will do an appeal for the patient. Please let patient know when this has been done so she can contact [facility] to start the appeal.     Lois Salguero MA

## 2018-06-01 DIAGNOSIS — E66.812 CLASS 2 OBESITY DUE TO EXCESS CALORIES WITHOUT SERIOUS COMORBIDITY WITH BODY MASS INDEX (BMI) OF 39.0 TO 39.9 IN ADULT: ICD-10-CM

## 2018-06-01 DIAGNOSIS — E66.09 CLASS 2 OBESITY DUE TO EXCESS CALORIES WITHOUT SERIOUS COMORBIDITY WITH BODY MASS INDEX (BMI) OF 39.0 TO 39.9 IN ADULT: ICD-10-CM

## 2018-06-01 NOTE — TELEPHONE ENCOUNTER
She was supposed to follow-up in a month from the last prescription given by CDL.  Please schedule appointment to discuss refills.

## 2018-06-01 NOTE — TELEPHONE ENCOUNTER
Phentermine       Last Written Prescription Date:  2/8/18  Last Fill Quantity: 30,   # refills: 0  Last Office Visit: 2/8/18  Future Office visit:       Routing refill request to provider for review/approval because:  Drug not on the G, P or [facility] refill protocol or controlled substance

## 2018-06-03 ENCOUNTER — OFFICE VISIT (OUTPATIENT)
Dept: URGENT CARE | Facility: RETAIL CLINIC | Age: 43
End: 2018-06-03
Payer: COMMERCIAL

## 2018-06-03 VITALS — DIASTOLIC BLOOD PRESSURE: 102 MMHG | SYSTOLIC BLOOD PRESSURE: 147 MMHG | TEMPERATURE: 97.8 F | HEART RATE: 78 BPM

## 2018-06-03 DIAGNOSIS — J30.2 ACUTE SEASONAL ALLERGIC RHINITIS, UNSPECIFIED TRIGGER: Primary | ICD-10-CM

## 2018-06-03 PROCEDURE — 99213 OFFICE O/P EST LOW 20 MIN: CPT | Performed by: PHYSICIAN ASSISTANT

## 2018-06-03 RX ORDER — FLUTICASONE PROPIONATE 50 MCG
2 SPRAY, SUSPENSION (ML) NASAL DAILY
Qty: 1 BOTTLE | Refills: 3 | Status: SHIPPED | OUTPATIENT
Start: 2018-06-03 | End: 2018-09-27

## 2018-06-03 RX ORDER — BUPROPION HYDROCHLORIDE 150 MG/1
TABLET ORAL
COMMUNITY
Start: 2018-05-29 | End: 2019-01-07

## 2018-06-03 RX ORDER — CETIRIZINE HYDROCHLORIDE 10 MG/1
10 TABLET ORAL DAILY
Qty: 30 TABLET | Refills: 3 | Status: SHIPPED | OUTPATIENT
Start: 2018-06-03 | End: 2018-06-14 | Stop reason: ALTCHOICE

## 2018-06-03 RX ORDER — TRAZODONE HYDROCHLORIDE 50 MG/1
TABLET, FILM COATED ORAL
COMMUNITY
Start: 2018-05-29 | End: 2019-01-07

## 2018-06-03 NOTE — PATIENT INSTRUCTIONS
Take daily oral antihistamine (zyrtec/certirizine) 1 tablet once a day   Start steroid nasal spray 2 sprays each nostril once a day  Can take benadryl at night if needed for few nights  Continue for 2 weeks or longer if needed  Ibuprofen or tylenol as needed for headache  Avoid allergen triggers if determined  Please follow up with primary care provider if not improving, worsening or new symptoms or for any adverse reactions to medications.

## 2018-06-03 NOTE — PROGRESS NOTES
Chief Complaint   Patient presents with     Sinus Problem     sinus pain and  pressure, congestion and nasal drainage x 2 days, no fevers     Otalgia     bilateral ear pain x 2 days      SUBJECTIVE:  Andree Kennedy is a 42 year old female here with concerns about sinus pressure. She states onset of symptoms were 2 day(s) ago.  She has had maxillary pressure. Course of illness is worsening. Severity moderate  Current and Associated symptoms: congestion, sneezing, drainage, pressure, ear pain  Predisposing factors include none. Recent treatment has included: None    Past Medical History:   Diagnosis Date     Anxiety      Bipolar 1 disorder (H)      Depressive disorder      Papanicolaou smear of cervix with low grade squamous intraepithelial lesion (LGSIL) 11/3/15, 11/4/16    + HR HPV     Current Outpatient Prescriptions   Medication Sig Dispense Refill     ALPRAZolam (XANAX PO) Take by mouth every 4 hours as needed for anxiety       buPROPion (WELLBUTRIN XL) 150 MG 24 hr tablet        Lurasidone HCl (LATUDA PO) Take 40 mg by mouth daily        order for DME Equipment being ordered: Automatic blood pressure machine 1 each 0     Sertraline HCl (ZOLOFT PO) Take 100 mg by mouth daily        traZODone (DESYREL) 50 MG tablet        naltrexone-bupropion (CONTRAVE) 8-90 MG per 12 hr tablet Take 1 tablet by mouth daily (Patient not taking: Reported on 6/3/2018) 30 tablet 0     phentermine 37.5 MG capsule Take 1 capsule (37.5 mg) by mouth every morning (Patient not taking: Reported on 6/3/2018) 30 capsule 0      No Known Allergies     History   Smoking Status     Never Smoker   Smokeless Tobacco     Never Used       ROS:  CONSTITUTIONAL:NEGATIVE for fever, chills  ENT/MOUTH: POSITIVE for ear pain bilateral, sinus pressure, nasal congestion, postnasal drainage, sneezing and rhinorrhea-clear and NEGATIVE for sore throat  RESP:NEGATIVE for significant cough or wheezing    OBJECTIVE:  BP (!) 147/102 (BP Location: Left arm)   Pulse 78  Temp 97.8  F (36.6  C) (Oral)  Exam:GENERAL APPEARANCE: crying due to discomfort  EYES: conjunctiva clear  HENT: ear canals and TM's normal.  Nose enlarged mucosa, boggy, bluish hue, clear rhinorrhea. mouth without ulcers, erythema or lesions  NECK: supple, nontender, no lymphadenopathy  RESP: lungs clear to auscultation - no rales, rhonchi or wheezes  CV: regular rates and rhythm, normal S1 S2, no murmur noted  SKIN: no suspicious lesions or rashes    ASSESSMENT:  (J30.2) Acute seasonal allergic rhinitis, unspecified trigger    PLAN:   fluticasone (FLONASE) 50 MCG/ACT spray,   cetirizine (ZYRTEC) 10 MG tablet  Take daily oral antihistamine (zyrtec/certirizine) 1 tablet once a day   Start steroid nasal spray 2 sprays each nostril once a day  Can take benadryl at night if needed for few nights  Continue for 2 weeks or longer if needed  Ibuprofen or tylenol as needed for headache  Avoid allergen triggers if determined  Please follow up with primary care provider if not improving, worsening or new symptoms or for any adverse reactions to medications.     Flor Romero PA-C  [facility]

## 2018-06-03 NOTE — MR AVS SNAPSHOT
After Visit Summary   6/3/2018    Andree Kennedy    MRN: [id number]           Patient Information     Date Of Birth          1975        Visit Information        Provider Department      6/3/2018 12:50 PM Flor Romero PA-C [facility]        Today's Diagnoses     Acute seasonal allergic rhinitis, unspecified trigger    -  1      Care Instructions    Take daily oral antihistamine (zyrtec/certirizine) 1 tablet once a day   Start steroid nasal spray 2 sprays each nostril once a day  Can take benadryl at night if needed for few nights  Continue for 2 weeks or longer if needed  Ibuprofen or tylenol as needed for headache  Avoid allergen triggers if determined  Please follow up with primary care provider if not improving, worsening or new symptoms or for any adverse reactions to medications.             Follow-ups after your visit        Who to contact     You can reach your care team any time of the day by calling [phone].  Notification of test results:  If you have an abnormal lab result, we will notify you by phone as soon as possible.         Additional Information About Your Visit        [facility] Information     [facility] gives you secure access to your electronic health record. If you see a primary care provider, you can also send messages to your care team and make appointments. If you have questions, please call your primary care clinic.  If you do not have a primary care provider, please call [phone] and they will assist you.        Care EveryWhere ID     This is your Care EveryWhere ID. This could be used by other organizations to access your [place] medical records  [id number]        Your Vitals Were     Pulse Temperature                78 97.8  F (36.6  C) (Oral)           Blood Pressure from Last 3 Encounters:   06/03/18 (!) 147/102   02/08/18 130/86   01/22/18 128/70    Weight from Last 3 Encounters:   02/08/18 235 lb 9.6 oz (106.9 kg)   01/22/18  227 lb (103 kg)   01/05/18 229 lb (103.9 kg)              Today, you had the following     No orders found for display         Today's Medication Changes          These changes are accurate as of 6/3/18  1:20 PM.  If you have any questions, ask your nurse or doctor.               Start taking these medicines.        Dose/Directions    cetirizine 10 MG tablet   Commonly known as:  zyrTEC   Used for:  Acute seasonal allergic rhinitis, unspecified trigger        Dose:  10 mg   Take 1 tablet (10 mg) by mouth daily   Quantity:  30 tablet   Refills:  3       fluticasone 50 MCG/ACT spray   Commonly known as:  FLONASE   Used for:  Acute seasonal allergic rhinitis, unspecified trigger        Dose:  2 spray   Spray 2 sprays into both nostrils daily   Quantity:  1 Bottle   Refills:  3            Where to get your medicines      These medications were sent to [id number] - [place], [place] - [address]  [address] [facility] [address]     Phone:  [phone]     cetirizine 10 MG tablet    fluticasone 50 MCG/ACT spray                Primary Care Provider Office Phone # Fax #    Letty Oneill PA-C [phone]       [address]  [facility] [address]        Equal Access to Services     ERIC VALENTIN AH: Hadii catrachita lerma hadasho Soomaali, waaxda luqadaha, qaybta kaalmada adeegyada, ana bergman. So [facility] [phone].    ATENCIÓN: Si habla español, tiene a rivers disposición servicios gratuitos de asistencia lingüística. Llame al [phone].    We comply with applicable federal civil rights laws and [place] laws. We do not discriminate on the basis of race, color, national origin, age, disability, sex, sexual orientation, or gender identity.            Thank you!     Thank you for choosing [facility]  for your care. Our goal is always to provide you with excellent care. Hearing back from our patients is one way we can continue to  improve our services. Please take a few minutes to complete the written survey that you may receive in the mail after your visit with us. Thank you!             Your Updated Medication List - Protect others around you: Learn how to safely use, store and throw away your medicines at www.disposemymeds.org.          This list is accurate as of 6/3/18  1:20 PM.  Always use your most recent med list.                   Brand Name Dispense Instructions for use Diagnosis    buPROPion 150 MG 24 hr tablet    WELLBUTRIN XL          cetirizine 10 MG tablet    zyrTEC    30 tablet    Take 1 tablet (10 mg) by mouth daily    Acute seasonal allergic rhinitis, unspecified trigger       fluticasone 50 MCG/ACT spray    FLONASE    1 Bottle    Spray 2 sprays into both nostrils daily    Acute seasonal allergic rhinitis, unspecified trigger       LATUDA PO      Take 40 mg by mouth daily        naltrexone-bupropion 8-90 MG per 12 hr tablet    CONTRAVE    30 tablet    Take 1 tablet by mouth daily    Class 2 obesity due to excess calories without serious comorbidity with body mass index (BMI) of 39.0 to 39.9 in adult       order for DME     1 each    Equipment being ordered: Automatic blood pressure machine    HTN, goal below 140/90       phentermine 37.5 MG capsule     30 capsule    Take 1 capsule (37.5 mg) by mouth every morning    Class 2 obesity due to excess calories without serious comorbidity with body mass index (BMI) of 39.0 to 39.9 in adult       traZODone 50 MG tablet    DESYREL          XANAX PO      Take by mouth every 4 hours as needed for anxiety        ZOLOFT PO      Take 100 mg by mouth daily

## 2018-06-04 RX ORDER — PHENTERMINE HYDROCHLORIDE 37.5 MG/1
37.5 CAPSULE ORAL EVERY MORNING
Qty: 30 CAPSULE | Refills: 0 | Status: CANCELLED | OUTPATIENT
Start: 2018-06-04

## 2018-06-05 ENCOUNTER — TELEPHONE (OUTPATIENT)
Dept: FAMILY MEDICINE | Facility: OTHER | Age: 43
End: 2018-06-05

## 2018-06-05 NOTE — TELEPHONE ENCOUNTER
Spoke with patient.  Was seen at [facility] on sunday.  [place] like head was going to explode.  Headache, sore throat.  Was given script Flonase and zyrtec and benadryl.  Felt it started Friday with a cold.   Huddled with [facility],  Continue home cares: try nasal rinses, but needs to be seen for elevated BP. Could try allegra/sudafed   Has follow up appointment tomorrow am.    Alfredo Tripathi, RN, BSN

## 2018-06-05 NOTE — TELEPHONE ENCOUNTER
Reason for Call:  Same Day Appointment, Requested Provider:  anyone  in [place]     PCP: Letty Oneill    Reason for visit: allergies   Duration of symptoms: ongoing a few days getting worse, requested CDL, advised she is out all week, said would go to any openings in our dyad but not express care, there is no openings, offered Mahendra/Tiffanie Ray denied, can we work her in? She says she is miserable     Have you been treated for this in the past? Yes    Additional comments: none    Can we leave a detailed message on this number? YES    Phone number patient can be reached at: Home number on file [phone] (home)    Best Time: any    Call taken on 6/5/2018 at 1:12 PM by Mini Guardado

## 2018-06-05 NOTE — TELEPHONE ENCOUNTER
LM for the patient to return call to the clinic to discuss the below. Will await to hear from patient. Cammie Adams RN, BSN

## 2018-06-05 NOTE — PROGRESS NOTES
SUBJECTIVE:   Andree Kennedy is a 42 year old female who presents to clinic today for the following health issues:      HPI  Acute Illness   Acute illness concerns: possible sinus infection  Onset: 5 days ago   Started on Friday. They started out as cold symptoms and have progressed through the weekend.  She was seen at [facility] on 6/3/2018 and was told it was seasonal allergies and was started on antihistamine and nasal steroids.  She called them the other day to tell them it wasn't helping as much and they recommended switching to Allegra D.  She says her symptoms are improving but still miserable.     Fever: no     Chills/Sweats: YES    Headache (location?): YES    Sinus Pressure:YES    Conjunctivitis:  no    Ear Pain: YES- pressure    Rhinorrhea: YES    Congestion: YES    Sore Throat: YES     Cough: YES-productive of clear sputum    Wheeze: YES    Decreased Appetite: YES    Nausea: no     Vomiting: no     Diarrhea:  no     Dysuria/Freq.: no     Fatigue/Achiness: YES    Sick/Strep Exposure: no      Therapies Tried and outcome: Patient is currently taking Zyrtec, Allegra D, Flonase and Benadryl- was seen at [facility] on 06/01/2018 and was advised to take these she reports they are helping but patient is still miserable.     Problem list and histories reviewed & adjusted, as indicated.  Additional history: as documented    Patient Active Problem List   Diagnosis     Anxiety state     Headache, post-traumatic     Neck pain     Headaches, tension     Cervical radiculopathy     Neural foraminal stenosis of cervical spine     Cervical spondylosis     Postpartum hypertension     HTN, goal below 140/90     Obesity     Papanicolaou smear of cervix with low grade squamous intraepithelial lesion (LGSIL)     Hyperlipidemia LDL goal <130     Mild episode of recurrent major depressive disorder (H)     Bipolar I disorder (H)     Seasonal affective disorder (H)     Morbid obesity (H)     Past Surgical History:    Procedure Laterality Date     NO HISTORY OF SURGERY         Social History   Substance Use Topics     Smoking status: Never Smoker     Smokeless tobacco: Never Used     Alcohol use Yes      Comment: social     Family History   Problem Relation Age of Onset     Cancer - colorectal Mother      polyp removal     DIABETES Father      CANCER Father      Skin     Hypertension Father      HEART DISEASE Father      Unknown/Adopted Maternal Grandmother      Unknown/Adopted Maternal Grandfather      Unknown/Adopted Paternal Grandmother      Unknown/Adopted Paternal Grandfather          Current Outpatient Prescriptions   Medication Sig Dispense Refill     ALPRAZolam (XANAX PO) Take by mouth every 4 hours as needed for anxiety       amoxicillin-clavulanate (AUGMENTIN) 875-125 MG per tablet Take 1 tablet by mouth 2 times daily 20 tablet 0     buPROPion (WELLBUTRIN XL) 150 MG 24 hr tablet        cetirizine (ZYRTEC) 10 MG tablet Take 1 tablet (10 mg) by mouth daily 30 tablet 3     fluticasone (FLONASE) 50 MCG/ACT spray Spray 2 sprays into both nostrils daily 1 Bottle 3     Lurasidone HCl (LATUDA PO) Take 40 mg by mouth daily        Sertraline HCl (ZOLOFT PO) Take 100 mg by mouth daily        traZODone (DESYREL) 50 MG tablet        No Known Allergies  BP Readings from Last 3 Encounters:   06/06/18 132/88   06/03/18 (!) 147/102   02/08/18 130/86    Wt Readings from Last 3 Encounters:   06/06/18 229 lb 12.8 oz (104.2 kg)   02/08/18 235 lb 9.6 oz (106.9 kg)   01/22/18 227 lb (103 kg)         ROS:  Constitutional, HEENT, cardiovascular, pulmonary, gi and gu systems are negative, except as otherwise noted.    OBJECTIVE:     /88  Pulse 88  Temp 97.8  F (36.6  C) (Temporal)  Resp 16  Wt 229 lb 12.8 oz (104.2 kg)  SpO2 98%  Breastfeeding? No  BMI 36.24 kg/m2  Body mass index is 36.24 kg/(m^2).  GENERAL: healthy, alert and no distress  EYES: Eyes grossly normal to inspection, PERRL and conjunctivae and sclerae normal  HENT:  normal cephalic/atraumatic, ear canals and TM's normal, nasal mucosa edematous , rhinorrhea clear, oropharynx clear, oral mucous membranes moist, sinuses: not tender and clear post nasal drainage noted.   NECK: no adenopathy, no asymmetry, masses, or scars and thyroid normal to palpation  RESP: lungs clear to auscultation - no rales, rhonchi or wheezes  CV: regular rate and rhythm, normal S1 S2, no S3 or S4, no murmur, click or rub, no peripheral edema and peripheral pulses strong  SKIN: no suspicious lesions or rashes    Diagnostic Test Results:  none     ASSESSMENT/PLAN:       ICD-10-CM    1. Acute recurrent maxillary sinusitis J01.01 amoxicillin-clavulanate (AUGMENTIN) 875-125 MG per tablet       We had discussion about sinusitis (viral vs bacterial vs allergic).  At this time do not feel antibiotics would be beneficial for her but did send in an Rx so if her symptoms persist by Friday she should  the Rx and start the medication.     Recommended she continue to use the Flonase but add in Nasal Saline Wash.  She can take an anti-histamine if wanted but recommend Mucinex DM max especially as she starts to drain more she will cough more.      Follow-up if symptoms persist, worsen or new concerns.     Options for treatment and follow-up care were reviewed with the patient and/or guardian. Patient and/or guardian engaged in the decision making process and verbalized understanding of the options discussed and agreed with the final plan.     Barbara Zamudio PA-C  [facility]

## 2018-06-06 ENCOUNTER — OFFICE VISIT (OUTPATIENT)
Dept: FAMILY MEDICINE | Facility: OTHER | Age: 43
End: 2018-06-06
Payer: COMMERCIAL

## 2018-06-06 VITALS
BODY MASS INDEX: 36.24 KG/M2 | HEART RATE: 88 BPM | SYSTOLIC BLOOD PRESSURE: 132 MMHG | WEIGHT: 229.8 LBS | RESPIRATION RATE: 16 BRPM | OXYGEN SATURATION: 98 % | TEMPERATURE: 97.8 F | DIASTOLIC BLOOD PRESSURE: 88 MMHG

## 2018-06-06 DIAGNOSIS — J01.01 ACUTE RECURRENT MAXILLARY SINUSITIS: Primary | ICD-10-CM

## 2018-06-06 PROBLEM — E66.01 MORBID OBESITY (H): Status: ACTIVE | Noted: 2018-06-06

## 2018-06-06 PROCEDURE — 99213 OFFICE O/P EST LOW 20 MIN: CPT | Performed by: PHYSICIAN ASSISTANT

## 2018-06-06 ASSESSMENT — PAIN SCALES - GENERAL: PAINLEVEL: NO PAIN (0)

## 2018-06-06 NOTE — PATIENT INSTRUCTIONS
"1. Antibiotic: In 2 days if not better pick the antibiotic, take twice per day for 10 days. Take the full course of the antibiotic.  Can consider adding in a probiotic or yogurt if GI upset is a concern. Follow-up if any problems with the antibiotic.    2. Nasal Saline: At the pharmacy you can find a \"Nettipot\" or NeilMed Nasal Rinse.  This is a salt solution that you mix with warm water.  I want you to perform nasal saline washes at least twice daily while you are sick.  You can do this anytime you feel like you are developing nasal congestion.  To properly utilize be sure you are leaning forward as far as you can and either tilt or squeeze slowly.  In the beginning this can be difficult to get to work properly but as the congestion is improved it will work easier.  If you don't use these properly you can feel as if you're drowning.     3. Nasal Steroid: Fluticasone/Flonase, These are OTC medications for allergies.  They are steroid sprays that are localized to the nose so no systemic effects. Two sprays each nostril once daily - allergist noted it is most effective if used before bed.  Ok to continue to use nasal saline as well.  When you spray it in the nose it should be up and out towards the eye on the side you are spraying the medication.  You should not taste the medication and it should not drip out the nose. This will decrease inflammation and also nasal mucous production.  You can also use this anytime you are developing nasal congestion.  Ok in the future to start these as soon as you feel you are developing nasal congestion, sinus pressure, increased nasal drainage.    4. IMPORTANT: When using other nasal inhaled products especially 12 hour sprays such as Afrin you should only use for a max of approximately 3 days, longer use could result in rebound congestion (congestion that develops because you're off the medication).    5. If no blood pressure issues recommend Mucinex DM Max, if issues with your " blood pressure ok to just use plain mucinex product.    6. Heat packs on sinuses    7. Ibuprofen as needed      Follow-up if symptoms worsen or do not resolve.  Feel free to call with any questions or concerns.

## 2018-06-06 NOTE — MR AVS SNAPSHOT
"              After Visit Summary   6/6/2018    Andree Kennedy    MRN: [id number]           Patient Information     Date Of Birth          1975        Visit Information        Provider Department      6/6/2018 8:00 AM Barbara Zamudio PA-C [facility] Instructions    1. Antibiotic: In 2 days if not better pick the antibiotic, take twice per day for 10 days. Take the full course of the antibiotic.  Can consider adding in a probiotic or yogurt if GI upset is a concern. Follow-up if any problems with the antibiotic.    2. Nasal Saline: At the pharmacy you can find a \"Nettipot\" or NeilMed Nasal Rinse.  This is a salt solution that you mix with warm water.  I want you to perform nasal saline washes at least twice daily while you are sick.  You can do this anytime you feel like you are developing nasal congestion.  To properly utilize be sure you are leaning forward as far as you can and either tilt or squeeze slowly.  In the beginning this can be difficult to get to work properly but as the congestion is improved it will work easier.  If you don't use these properly you can feel as if you're drowning.     3. Nasal Steroid: Fluticasone/Flonase, These are OTC medications for allergies.  They are steroid sprays that are localized to the nose so no systemic effects. Two sprays each nostril once daily - allergist noted it is most effective if used before bed.  Ok to continue to use nasal saline as well.  When you spray it in the nose it should be up and out towards the eye on the side you are spraying the medication.  You should not taste the medication and it should not drip out the nose. This will decrease inflammation and also nasal mucous production.  You can also use this anytime you are developing nasal congestion.  Ok in the future to start these as soon as you feel you are developing nasal congestion, sinus pressure, increased nasal drainage.    4. IMPORTANT: When using other nasal " inhaled products especially 12 hour sprays such as Afrin you should only use for a max of approximately 3 days, longer use could result in rebound congestion (congestion that develops because you're off the medication).    5. If no blood pressure issues recommend Mucinex DM Max, if issues with your blood pressure ok to just use plain mucinex product.    6. Heat packs on sinuses    7. Ibuprofen as needed      Follow-up if symptoms worsen or do not resolve.  Feel free to call with any questions or concerns.              Follow-ups after your visit        Follow-up notes from your care team     Return if symptoms worsen or fail to improve.      Your next 10 appointments already scheduled     Jun 14, 2018  1:00 PM CDT   Office Visit with Letty Oneill PA-C   [facility]    [address] [id number] [phone]           Bring a current list of meds and any records pertaining to this visit. For Physicals, please bring immunization records and any forms needing to be filled out. Please arrive 10 minutes early to complete paperwork.              Who to contact     If you have questions or need follow up information about today's clinic visit or your schedule please contact [facility] directly at [phone].  Normal or non-critical lab and imaging results will be communicated to you by [facility], letter or phone within 4 business days after the clinic has received the results. If you do not hear from us within 7 days, please contact the clinic through [facility] or phone. If you have a critical or abnormal lab result, we will notify you by phone as soon as possible.  Submit refill requests through [facility] or call your pharmacy and they will forward the refill request to us. Please allow 3 business days for your refill to be completed.          Additional Information About Your Visit        [facility] Information     [facility] gives  you secure access to your electronic health record. If you see a primary care provider, you can also send messages to your care team and make appointments. If you have questions, please call your primary care clinic.  If you do not have a primary care provider, please call [phone] and they will assist you.        Care EveryWhere ID     This is your Care EveryWhere ID. This could be used by other organizations to access your [place] medical records  [id number]        Your Vitals Were     Pulse Temperature Respirations Pulse Oximetry Breastfeeding? BMI (Body Mass Index)    88 97.8  F (36.6  C) (Temporal) 16 98% No 36.24 kg/m2       Blood Pressure from Last 3 Encounters:   06/06/18 132/88   06/03/18 (!) 147/102   02/08/18 130/86    Weight from Last 3 Encounters:   06/06/18 229 lb 12.8 oz (104.2 kg)   02/08/18 235 lb 9.6 oz (106.9 kg)   01/22/18 227 lb (103 kg)              Today, you had the following     No orders found for display         Today's Medication Changes          These changes are accurate as of 6/6/18  8:24 AM.  If you have any questions, ask your nurse or doctor.               Stop taking these medicines if you haven't already. Please contact your care team if you have questions.     naltrexone-bupropion 8-90 MG per 12 hr tablet   Commonly known as:  CONTRAVE   Stopped by:  Barbara Zamudio PA-C           order for DME   Stopped by:  Barbara Zamudio PA-C           phentermine 37.5 MG capsule   Stopped by:  Barbara Zamudio PA-C                    Primary Care Provider Office Phone # Fax #    Letty GILBERT Oneill PA-C [phone]       [address]  [facility] [address]        Equal Access to Services     ERIC VALENTIN AH: Yajaira Alfaro, michael ferris, angelina pattersonalmaana cristobal. So [facility] [phone].    ATENCIÓN: Si habla español, tiene a rivers disposición servicios gratuitos de asistencia lingüística. Llame al  [phone].    We comply with applicable federal civil rights laws and [place] laws. We do not discriminate on the basis of race, color, national origin, age, disability, sex, sexual orientation, or gender identity.            Thank you!     Thank you for choosing [facility]  for your care. Our goal is always to provide you with excellent care. Hearing back from our patients is one way we can continue to improve our services. Please take a few minutes to complete the written survey that you may receive in the mail after your visit with us. Thank you!             Your Updated Medication List - Protect others around you: Learn how to safely use, store and throw away your medicines at www.disposemymeds.org.          This list is accurate as of 6/6/18  8:24 AM.  Always use your most recent med list.                   Brand Name Dispense Instructions for use Diagnosis    buPROPion 150 MG 24 hr tablet    WELLBUTRIN XL          cetirizine 10 MG tablet    zyrTEC    30 tablet    Take 1 tablet (10 mg) by mouth daily    Acute seasonal allergic rhinitis, unspecified trigger       fluticasone 50 MCG/ACT spray    FLONASE    1 Bottle    Spray 2 sprays into both nostrils daily    Acute seasonal allergic rhinitis, unspecified trigger       LATUDA PO      Take 40 mg by mouth daily        traZODone 50 MG tablet    DESYREL          XANAX PO      Take by mouth every 4 hours as needed for anxiety        ZOLOFT PO      Take 100 mg by mouth daily

## 2018-06-06 NOTE — NURSING NOTE
"Chief Complaint   Patient presents with     Allergies     Panel Management     PHQ9       Initial /88  Pulse 88  Temp 97.8  F (36.6  C) (Temporal)  Resp 16  Wt 229 lb 12.8 oz (104.2 kg)  SpO2 98%  Breastfeeding? No  BMI 36.24 kg/m2 Estimated body mass index is 36.24 kg/(m^2) as calculated from the following:    Height as of 1/5/18: 5' 6.77\" (1.696 m).    Weight as of this encounter: 229 lb 12.8 oz (104.2 kg).  Medication Reconciliation: complete    Lois Salguero MA  "

## 2018-06-11 NOTE — PROGRESS NOTES
SUBJECTIVE:   Andree Kennedy is a 42 year old female who presents to clinic today for the following health issues:    HPI  Medication Followup of  Phentermine    Taking Medication as prescribed: NO-has not taken this medication in about 1 month because of insurance coverage issues.    Side Effects:  Has not filled RX    Medication Helping Symptoms:  not applicable    - Contrave, Phen-Topa, and something else are covered with [place]      Was told Phentermine-Topamax would be covered with medical necessity letter   - Phentermine alone works when on it   - Considering gastric sleeve, went to initial class     - Continued sinus infection but improving, Allegra-D given, hasn't completed 10 days yet, wondering if ok to continue and if can get RX for this   - Pollen and allergies bad this time of year        Hypertension Follow-up      Outpatient blood pressures are being checked at home.  Results are sporatic.    Low Salt Diet: low salt          Plan from last visit 2/8/18  1. HTN   - Continues to fluctuate but does come down, not on medications at this point   - Discussed likely due to phentermine   - Patient to continue to monitor   - Recheck monthly       2. Obesity   - 2 month of phentermine done, doing well with ~10 lbs weight loss      Filled 11/28 and 12/28 per [place] , been off due to acute illness, refilled today      Reviewed use and side effects   - Will continue for a total of 3 months and then try to find long term medication to maintain      Gave list of these for patient to check with insurance for coverage  - Recheck 1 month   - Continue with plan to pursue gastric bypass if we can't get any further with this       3-5. Mood   - Follows with psychiatry and counseling weekly, not managed by me   - Did assure no SI or plan due to PHQ9 scores, patient denies both         BP Readings from Last 3 Encounters:   06/14/18 140/88   06/06/18 132/88   06/03/18 (!) 147/102         Problem list and histories reviewed &  "adjusted, as indicated.  Additional history: as documented    ROS:  Constitutional, HEENT, cardiovascular, pulmonary, gi and gu systems are negative, except as otherwise noted.    OBJECTIVE:   /88  Pulse 100  Temp 97.6  F (36.4  C) (Temporal)  Resp 18  Ht 5' 7\" (1.702 m)  Wt 228 lb (103.4 kg)  SpO2 97%  BMI 35.71 kg/m2  Body mass index is 35.71 kg/(m^2).  GENERAL APPEARANCE: healthy, alert and no distress  EYES: Eyes grossly normal to inspection, PERRLA, conjunctivae and sclerae without injection or discharge, EOM intact   RESP: Lungs clear to auscultation - no rales, rhonchi or wheezes    CV: Regular rates and rhythm, normal S1 S2, no S3 or S4, no murmur, click or rub, no peripheral edema and peripheral pulses strong and symmetric bilaterally   MS: No musculoskeletal defects are noted and gait is age appropriate without ataxia   SKIN: No suspicious lesions or rashes, hydration status appears adeuqate with normal skin turgor   PSYCH: Alert and oriented x3; speech- coherent , normal rate and volume; able to articulate logical thoughts, able to abstract reason, no tangential thoughts, no hallucinations or delusions, mentation appears normal, Mood is euthymic. Affect is appropriate for this mood state and bright. Thought content is free of suicidal ideation, hallucinations, and delusions. Dress is adequate and upkept. Eye contact is good during conversation.       Diagnostic Test Results:  none     ASSESSMENT/PLAN:       ICD-10-CM    1. Morbid obesity (H) E66.01 phentermine 37.5 MG capsule   2. Chronic seasonal allergic rhinitis due to pollen J30.1 fexofenadine-pseudoePHEDrine (ALLEGRA-D 24) 180-240 MG per 24 hr tablet   3. HTN, goal below 140/90 I10 propranolol (INDERAL) 10 MG tablet   4. Bipolar I disorder (H) F31.9    5. Mild episode of recurrent major depressive disorder (H) F33.0    6. Seasonal affective disorder (H) F33.9    7. Anxiety state F41.1      1. Obesity  - Patient very frustrated with " insurance and prior auth/appeal process   - See other telephone encounter, keeps calling and is told with letter of medical necessity that the Phentermine-Topamax should be covered, really wants to try this   - Weight reached a plateau and can't get more off, wants to so can get bariatric surgery, already went to class   - For now, will re-start Phentermine 37.5 mg as this has been well tolerated in the past and only did 1 month this year, reviewed use and side effects, can only do for 12 weeks, discussed can worsen BP, should continue to monitor at home, see below for HTN management     2. Allergic Rhinitis   - Reviewed FP and UC notes about her sinuses   - OK to continue Allegra-D for 10-14 days in total, will give refill, discussed that this medication can raise BP so should use caution and monitor BP     3. HTN   - Continues to be elevated at each appointment and not on medication   - As above, both Allegra-D and Phentermine can worsen  - Recommend we start medication at this time   - Will recommend Propranolol as this will help with pulses and may additional help with anxiety (see below)   - Start with 10 mg BID, reviewed use and side effects  - Recheck 1 month     4-7. Mood   - Has counseling and psychiatrist at [facility] (Dr. Shaista Colon)   - On Latuda, Zoloft, and Trazodone, recently was told to start Wellbutrin but didn't because didn't know if was going to be on Contrave or not and knew that there was Wellbutrin in that   - Will plan on starting since we are going a different route for weight loss   - Discussed how Propanolol may help a little with anxiety as well  - Discussed can take over her medication management in the future, but recommend continue with psychiatry until stable first     The patient indicates understanding of these issues and agrees with the plan.    Follow up: 1 month         Letty Jim-ARIA Bella  [facility]

## 2018-06-14 ENCOUNTER — OFFICE VISIT (OUTPATIENT)
Dept: FAMILY MEDICINE | Facility: OTHER | Age: 43
End: 2018-06-14
Payer: COMMERCIAL

## 2018-06-14 VITALS
HEIGHT: 67 IN | DIASTOLIC BLOOD PRESSURE: 88 MMHG | WEIGHT: 228 LBS | OXYGEN SATURATION: 97 % | BODY MASS INDEX: 35.79 KG/M2 | RESPIRATION RATE: 18 BRPM | HEART RATE: 100 BPM | SYSTOLIC BLOOD PRESSURE: 140 MMHG | TEMPERATURE: 97.6 F

## 2018-06-14 DIAGNOSIS — F33.8 SEASONAL AFFECTIVE DISORDER (H): ICD-10-CM

## 2018-06-14 DIAGNOSIS — I10 HTN, GOAL BELOW 140/90: ICD-10-CM

## 2018-06-14 DIAGNOSIS — F33.0 MILD EPISODE OF RECURRENT MAJOR DEPRESSIVE DISORDER (H): ICD-10-CM

## 2018-06-14 DIAGNOSIS — E66.01 MORBID OBESITY (H): Primary | ICD-10-CM

## 2018-06-14 DIAGNOSIS — F41.1 ANXIETY STATE: ICD-10-CM

## 2018-06-14 DIAGNOSIS — J30.1 CHRONIC SEASONAL ALLERGIC RHINITIS DUE TO POLLEN: ICD-10-CM

## 2018-06-14 DIAGNOSIS — F31.9 BIPOLAR I DISORDER (H): ICD-10-CM

## 2018-06-14 PROBLEM — J30.2 CHRONIC SEASONAL ALLERGIC RHINITIS: Status: ACTIVE | Noted: 2018-06-14

## 2018-06-14 PROCEDURE — 99214 OFFICE O/P EST MOD 30 MIN: CPT | Performed by: PHYSICIAN ASSISTANT

## 2018-06-14 RX ORDER — FEXOFENADINE HCL AND PSEUDOEPHEDRINE HCL 180; 240 MG/1; MG/1
1 TABLET, EXTENDED RELEASE ORAL DAILY
Qty: 28 TABLET | Refills: 1 | Status: SHIPPED | OUTPATIENT
Start: 2018-06-14 | End: 2019-01-07

## 2018-06-14 RX ORDER — PHENTERMINE HYDROCHLORIDE 37.5 MG/1
37.5 CAPSULE ORAL EVERY MORNING
Qty: 30 CAPSULE | Refills: 0 | Status: SHIPPED | OUTPATIENT
Start: 2018-06-14 | End: 2018-07-05

## 2018-06-14 RX ORDER — PROPRANOLOL HYDROCHLORIDE 10 MG/1
10 TABLET ORAL 2 TIMES DAILY
Qty: 30 TABLET | Refills: 1 | Status: SHIPPED | OUTPATIENT
Start: 2018-06-14 | End: 2018-09-27 | Stop reason: ALTCHOICE

## 2018-06-14 ASSESSMENT — ANXIETY QUESTIONNAIRES
GAD7 TOTAL SCORE: 21
7. FEELING AFRAID AS IF SOMETHING AWFUL MIGHT HAPPEN: NEARLY EVERY DAY
3. WORRYING TOO MUCH ABOUT DIFFERENT THINGS: NEARLY EVERY DAY
GAD7 TOTAL SCORE: 21
2. NOT BEING ABLE TO STOP OR CONTROL WORRYING: NEARLY EVERY DAY
7. FEELING AFRAID AS IF SOMETHING AWFUL MIGHT HAPPEN: NEARLY EVERY DAY
1. FEELING NERVOUS, ANXIOUS, OR ON EDGE: NEARLY EVERY DAY
GAD7 TOTAL SCORE: 21
5. BEING SO RESTLESS THAT IT IS HARD TO SIT STILL: NEARLY EVERY DAY
4. TROUBLE RELAXING: NEARLY EVERY DAY
6. BECOMING EASILY ANNOYED OR IRRITABLE: NEARLY EVERY DAY

## 2018-06-14 ASSESSMENT — PATIENT HEALTH QUESTIONNAIRE - PHQ9
SUM OF ALL RESPONSES TO PHQ QUESTIONS 1-9: 25
SUM OF ALL RESPONSES TO PHQ QUESTIONS 1-9: 25

## 2018-06-14 ASSESSMENT — PAIN SCALES - GENERAL: PAINLEVEL: NO PAIN (0)

## 2018-06-14 NOTE — MR AVS SNAPSHOT
After Visit Summary   6/14/2018    Andree Kennedy    MRN: [id number]           Patient Information     Date Of Birth          1975        Visit Information        Provider Department      6/14/2018 1:00 PM Letty Oneill PA-C [facility]        Today's Diagnoses     HTN, goal below 140/90    -  1    Morbid obesity (H)        Chronic seasonal allergic rhinitis due to pollen          Care Instructions                   Birth Control Methods  What is contraception?   Birth control and contraception are terms used to refer to ways to prevent pregnancy. There are many ways to prevent pregnancy when you have sexual intercourse. They include the use of hormone medicines, contraceptive devices (barriers and IUDs), periods of avoiding sex, spermicides, withdrawal, and devices and surgery for sterilization. Some birth control methods work better than others.  You may want to choose a kind of birth control that will also help keep you from getting sexually transmitted disease (STD). Sometimes you may need to use more than one method to try to prevent pregnancy AND infection. You can use latex or polyurethane male condoms or the female condom to try to avoid getting STDs. They are the only birth control methods that will lessen your risk of being infected with HIV, the virus that causes AIDS. Hormones, natural family planning, and withdrawal do not give any protection against infection.  What are the different methods of contraception?   Hormone Medicines  Birth control pills, shots, vaginal rings, skin patches, and implants contain manmade forms of the hormones estrogen and/or progesterone. The hormones stop a woman's ovaries from releasing an egg each month. They also make it harder for sperm to get into the uterus or for a fertilized egg to stay in the uterus.  Birth control pills are taken according to a daily schedule prescribed by your healthcare provider.   One shot of  Depo-Provera, which contains progesterone, can prevent pregnancy for 3 months.   Vaginal rings are flexible rings put into the vagina. The rings release hormones into the body. A ring stays in the vagina for 3 weeks. Then it is removed. After 1 week a new ring is put into the vagina and the cycle is repeated.   Patches containing hormones may be put on the skin. A new patch is worn every week for 3 weeks. During the 4th week, no patch is worn. Then the cycle is repeated.   The implant (Implanon) is a small, thin capsule containing progesterone. It is put under the skin of a woman's arm. The implant prevents pregnancy for up to 3 years.  You will need to see your healthcare provider to get any of these hormonal forms of birth control.  Contraceptive Devices  Most contraceptive devices form physical or chemical barriers that stop sperm from getting into the uterus.  The male condom is a tube of thin material. (Latex rubber or polyurethane is best.) It is rolled over the erect penis before the penis touches any part of a woman's genital area. The male condom is the best protection against STDs, including HIV and hepatitis B.   The female condom is a 7-inch-long pouch. The pouch is made of polyurethane. It has 2 flexible rings. It is inserted into the vagina before sex. The female condom covers the cervix, vagina, and area around the vagina. It may protect against some STDs, such as HIV and hepatitis B.   Spermicides are chemicals that kill sperm. They are available as foam, jelly, foaming tablets, vaginal suppositories, or cream. They are inserted into the vagina no more than 30 minutes before sex. Spermicides should NOT be used alone. They work much better when they are used with another form of birth control, such as a condom or diaphragm. Spermicides do not protect against STDs.   The sponge is a round, soft piece of polyurethane foam. It is soaked with a spermicide. No more than 24 hours before intercourse, the  sponge is dampened with water and inserted into the vagina against the cervix.   The diaphragm is a soft rubber dome stretched over a flexible ring. No more than 6 hours before sex, you fill the diaphragm with a spermicidal jelly or cream and put it into the vagina.   The intrauterine device (IUD) is a small plastic device containing copper or hormones. The IUD prevents the egg from being fertilized or implanting and growing in the uterus. An IUD is put into the uterus by your healthcare provider. Depending on the type, it may be kept in the uterus 5 to 10 years before it must be replaced with a new one.  You can buy condoms, spermicides, and sponges at drug and grocery stores without a prescription. A diaphragm needs to be fitted by a healthcare provider. If you choose to use an IUD, you will need to see your provider for insertion of the IUD.  Sterilization  Sterilization is a procedure done to close the tubes that carry the sperm or eggs. It may be done with surgery or special devices put into the tubes. A woman or man who is sterilized will no longer be able to have children. These procedures are usually permanent methods of birth control.   Removal of the uterus (hysterectomy) causes a woman to be sterile and unable to get pregnant. Hysterectomy is usually only done if there are problems with the female organs, such as fibroids or abnormal menstrual bleeding.  Natural Family Planning (Periodic Abstinence)  The natural family planning methods of birth control do not use any devices, drugs, or surgery. To prevent pregnancy you avoid having sex on certain days of each menstrual cycle when a women is fertile which is determined by observing vaginal mucous signs, and confirming with the temperature rise that comes with ovulation and keeping track of monthly period patterns.  It is recommended to take classes to learn this process.  Withdrawal Method  The withdrawal method involves removing the penis from the vagina  before ejaculation, when semen starts coming out. Often sperm get into the vagina before or during withdrawal. This method is unreliable. It often does not prevent pregnancy.  Emergency Contraception  A pill for emergency birth control may be taken to prevent pregnancy soon after you have had sex without birth control. It may also be used when another method of birth control has failed (for example, if a condom breaks). The pill contains a hormone that will prevent pregnancy if it is taken very soon after intercourse (within 3 to 5 days, depending on the medicine). In many areas, the pills are available at drug stores without a prescription for women over 18 years old.   A copper intrauterine device (IUD) is another type of emergency birth control. The IUD may be put into the uterus by your healthcare provider within 5 days after unprotected sex. It may prevent pregnancy by stopping fertilization or keeping a fertilized egg from attaching to the womb.  How well do the various methods prevent pregnancy?   The following chart shows the typical failure rates of the different kinds of birth control methods. The failure rate is the number of pregnancies expected per 100 women during 1 year of using each method. The failure rate can depend on how correctly each method is followed. If a method is used perfectly, the failure rate is lower than the typical rate shown here. Use of more than 1 method (for example, birth control pills and condoms) can decrease the chances of failure.                               Percentage of Women Having an     Birth Control             Unintended Pregnancy within the        Method                       First Year of Use    -----------------------------------------------------------                                   Typical Use    Perfect Use    -----------------------------------------------------------   Spermicides                         29%          18%   Natural Family Planning      (Periodic Abstinence)                           9%     Symptothermal method                            2%   Withdrawal                          18%           4%   Diaphragm with Spermicide           16%           6%   Condom     Female                            27%           5%     Male                              17%           2%   Sponge     Women who have given birth        32%          26%     Women who have not given birth    16%           9%   Pill                                 9%           0.3%   IUD     with copper                        1%           0.6%     with hormones                      0.1%         0.1%   Shot (Depo-Provera)                  7%           0.3%   Implant                              1%           0.05%   Patch (Ortho Evra)                   8%           0.3%   Vaginal ring (NuvaRing)              8%           0.3%   Female Sterilization                 0.7%         0.5%   Male Sterilization                   0.2%         0.1%   No Method                           85%          85%   -----------------------------------------------------------      Note: These failure rates are modified from the [employer] [place] January 2008. Facts on Contraceptive Use. Available at http://www.[employer].org/pubs/fb_contr_use.html              Follow-ups after your visit        Follow-up notes from your care team     Return in about 1 month (around 7/14/2018).      Who to contact     If you have questions or need follow up information about today's clinic visit or your schedule please contact [facility] directly at [phone].  Normal or non-critical lab and imaging results will be communicated to you by MyChart, letter or phone within 4 business days after the clinic has received the results. If you do not hear from us within 7 days, please contact the clinic through MyChart or phone. If you have a critical or abnormal lab result, we will notify you by phone as soon as  possible.  Submit refill requests through [employer] or call your pharmacy and they will forward the refill request to us. Please allow 3 business days for your refill to be completed.          Additional Information About Your Visit        [facility] [facility] [employer] Information     [employer] gives you secure access to your electronic health record. If you see a primary care provider, you can also send messages to your care team and make appointments. If you have questions, please call your primary care clinic.  If you do not have a primary care provider, please call [phone] and they will assist you.        Care EveryWhere ID     This is your Care EveryWhere ID. This could be used by other organizations to access your [facility] medical records  [id number]        Your Vitals Were     Pulse Temperature Respirations Pulse Oximetry BMI (Body Mass Index)       100 97.6  F (36.4  C) (Temporal) 18 97% 35.95 kg/m2        Blood Pressure from Last 3 Encounters:   06/14/18 140/88   06/06/18 132/88   06/03/18 (!) 147/102    Weight from Last 3 Encounters:   06/14/18 228 lb (103.4 kg)   06/06/18 229 lb 12.8 oz (104.2 kg)   02/08/18 235 lb 9.6 oz (106.9 kg)              Today, you had the following     No orders found for display         Today's Medication Changes          These changes are accurate as of 6/14/18  1:46 PM.  If you have any questions, ask your nurse or doctor.               Start taking these medicines.        Dose/Directions    fexofenadine-pseudoePHEDrine 180-240 MG per 24 hr tablet   Commonly known as:  ALLEGRA-D 24   Used for:  Chronic seasonal allergic rhinitis due to pollen   Started by:  Letty Oneill PA-C        Dose:  1 tablet   Take 1 tablet by mouth daily   Quantity:  28 tablet   Refills:  1       phentermine 37.5 MG capsule   Used for:  Morbid obesity (H)   Started by:  Letty Oneill PA-C        Dose:  37.5 mg   Take 1 capsule (37.5 mg) by mouth every morning   Quantity:  30 capsule    Refills:  0       propranolol 10 MG tablet   Commonly known as:  INDERAL   Used for:  HTN, goal below 140/90   Started by:  Letty Oneill PA-C        Dose:  10 mg   Take 1 tablet (10 mg) by mouth 2 times daily   Quantity:  30 tablet   Refills:  1         Stop taking these medicines if you haven't already. Please contact your care team if you have questions.     cetirizine 10 MG tablet   Commonly known as:  zyrTEC   Stopped by:  Letty Oneill PA-C                Where to get your medicines      These medications were sent [place] [facility] [place] - [address]  [address], [facility] [address]     Phone:  [phone]     propranolol 10 MG tablet         Some of these will need a paper prescription and others can be bought over the counter.  Ask your nurse if you have questions.     Bring a paper prescription for each of these medications     fexofenadine-pseudoePHEDrine 180-240 MG per 24 hr tablet    phentermine 37.5 MG capsule                Primary Care Provider Office Phone # Fax #    Letty Oneill PA-C [phone]       [address]  [facility] [address]        Equal Access to Services     ERIC VALENTIN AH: Hadii catrachita lerma hadasho Soomaali, waaxda luqadaha, qaybta kaalmada adeegyada, ana bergman. So [facility] [phone].    ATENCIÓN: Si habla español, tiene a rivers disposición servicios gratuitos de asistencia lingüística. Llame al [phone].    We comply with applicable federal civil rights laws and [place] laws. We do not discriminate on the basis of race, color, national origin, age, disability, sex, sexual orientation, or gender identity.            Thank you!     Thank you for choosing [facility]  for your care. Our goal is always to provide you with excellent care. Hearing back from our patients is one way we can continue to improve our services. Please take a few  minutes to complete the written survey that you may receive in the mail after your visit with us. Thank you!             Your Updated Medication List - Protect others around you: Learn how to safely use, store and throw away your medicines at www.disposemymeds.org.          This list is accurate as of 6/14/18  1:46 PM.  Always use your most recent med list.                   Brand Name Dispense Instructions for use Diagnosis    ALLEGRA ALLERGY PO      Take 30 mg by mouth daily        buPROPion 150 MG 24 hr tablet    WELLBUTRIN XL          fexofenadine-pseudoePHEDrine 180-240 MG per 24 hr tablet    ALLEGRA-D 24    28 tablet    Take 1 tablet by mouth daily    Chronic seasonal allergic rhinitis due to pollen       fluticasone 50 MCG/ACT spray    FLONASE    1 Bottle    Spray 2 sprays into both nostrils daily    Acute seasonal allergic rhinitis, unspecified trigger       LATUDA PO      Take 40 mg by mouth daily        phentermine 37.5 MG capsule     30 capsule    Take 1 capsule (37.5 mg) by mouth every morning    Morbid obesity (H)       propranolol 10 MG tablet    INDERAL    30 tablet    Take 1 tablet (10 mg) by mouth 2 times daily    HTN, goal below 140/90       traZODone 50 MG tablet    DESYREL          XANAX PO      Take by mouth every 4 hours as needed for anxiety        ZOLOFT PO      Take 100 mg by mouth daily

## 2018-06-14 NOTE — TELEPHONE ENCOUNTER
Faxed letter of medical necessity and clinic info to [facility] Appeals [phone]. Will await response.     Medication Appeal Initiation    We have initiated an appeal for the requested medication:  Medication: Appeal initiated  Appeal Start Date:  6/14/2018  Insurance Company: [employer] - Phone [phone] Fax [phone]  Comments:     .

## 2018-06-14 NOTE — TELEPHONE ENCOUNTER
I would like this brought up again as it has been 3 weeks and patient was in office.    Per patient      When calls Scooter is told that if she gets a letter of Medical Necessity for Phentermine-Topiramate, it will be covered      Frustrated because then 3-4 days later gets call from us saying it is denied     I have written a letter of medical necessity under letters tab.     Let's work to get this resolved.     Bassem Oneill PA-C  [facility]

## 2018-06-14 NOTE — PATIENT INSTRUCTIONS
Birth Control Methods  What is contraception?   Birth control and contraception are terms used to refer to ways to prevent pregnancy. There are many ways to prevent pregnancy when you have sexual intercourse. They include the use of hormone medicines, contraceptive devices (barriers and IUDs), periods of avoiding sex, spermicides, withdrawal, and devices and surgery for sterilization. Some birth control methods work better than others.  You may want to choose a kind of birth control that will also help keep you from getting sexually transmitted disease (STD). Sometimes you may need to use more than one method to try to prevent pregnancy AND infection. You can use latex or polyurethane male condoms or the female condom to try to avoid getting STDs. They are the only birth control methods that will lessen your risk of being infected with HIV, the virus that causes AIDS. Hormones, natural family planning, and withdrawal do not give any protection against infection.  What are the different methods of contraception?   Hormone Medicines  Birth control pills, shots, vaginal rings, skin patches, and implants contain manmade forms of the hormones estrogen and/or progesterone. The hormones stop a woman's ovaries from releasing an egg each month. They also make it harder for sperm to get into the uterus or for a fertilized egg to stay in the uterus.  Birth control pills are taken according to a daily schedule prescribed by your healthcare provider.   One shot of Depo-Provera, which contains progesterone, can prevent pregnancy for 3 months.   Vaginal rings are flexible rings put into the vagina. The rings release hormones into the body. A ring stays in the vagina for 3 weeks. Then it is removed. After 1 week a new ring is put into the vagina and the cycle is repeated.   Patches containing hormones may be put on the skin. A new patch is worn every week for 3 weeks. During the 4th week, no patch is worn. Then the  cycle is repeated.   The implant (Implanon) is a small, thin capsule containing progesterone. It is put under the skin of a woman's arm. The implant prevents pregnancy for up to 3 years.  You will need to see your healthcare provider to get any of these hormonal forms of birth control.  Contraceptive Devices  Most contraceptive devices form physical or chemical barriers that stop sperm from getting into the uterus.  The male condom is a tube of thin material. (Latex rubber or polyurethane is best.) It is rolled over the erect penis before the penis touches any part of a woman's genital area. The male condom is the best protection against STDs, including HIV and hepatitis B.   The female condom is a 7-inch-long pouch. The pouch is made of polyurethane. It has 2 flexible rings. It is inserted into the vagina before sex. The female condom covers the cervix, vagina, and area around the vagina. It may protect against some STDs, such as HIV and hepatitis B.   Spermicides are chemicals that kill sperm. They are available as foam, jelly, foaming tablets, vaginal suppositories, or cream. They are inserted into the vagina no more than 30 minutes before sex. Spermicides should NOT be used alone. They work much better when they are used with another form of birth control, such as a condom or diaphragm. Spermicides do not protect against STDs.   The sponge is a round, soft piece of polyurethane foam. It is soaked with a spermicide. No more than 24 hours before intercourse, the sponge is dampened with water and inserted into the vagina against the cervix.   The diaphragm is a soft rubber dome stretched over a flexible ring. No more than 6 hours before sex, you fill the diaphragm with a spermicidal jelly or cream and put it into the vagina.   The intrauterine device (IUD) is a small plastic device containing copper or hormones. The IUD prevents the egg from being fertilized or implanting and growing in the uterus. An IUD is put  into the uterus by your healthcare provider. Depending on the type, it may be kept in the uterus 5 to 10 years before it must be replaced with a new one.  You can buy condoms, spermicides, and sponges at drug and grocery stores without a prescription. A diaphragm needs to be fitted by a healthcare provider. If you choose to use an IUD, you will need to see your provider for insertion of the IUD.  Sterilization  Sterilization is a procedure done to close the tubes that carry the sperm or eggs. It may be done with surgery or special devices put into the tubes. A woman or man who is sterilized will no longer be able to have children. These procedures are usually permanent methods of birth control.   Removal of the uterus (hysterectomy) causes a woman to be sterile and unable to get pregnant. Hysterectomy is usually only done if there are problems with the female organs, such as fibroids or abnormal menstrual bleeding.  Natural Family Planning (Periodic Abstinence)  The natural family planning methods of birth control do not use any devices, drugs, or surgery. To prevent pregnancy you avoid having sex on certain days of each menstrual cycle when a women is fertile which is determined by observing vaginal mucous signs, and confirming with the temperature rise that comes with ovulation and keeping track of monthly period patterns.  It is recommended to take classes to learn this process.  Withdrawal Method  The withdrawal method involves removing the penis from the vagina before ejaculation, when semen starts coming out. Often sperm get into the vagina before or during withdrawal. This method is unreliable. It often does not prevent pregnancy.  Emergency Contraception  A pill for emergency birth control may be taken to prevent pregnancy soon after you have had sex without birth control. It may also be used when another method of birth control has failed (for example, if a condom breaks). The pill contains a hormone that  will prevent pregnancy if it is taken very soon after intercourse (within 3 to 5 days, depending on the medicine). In many areas, the pills are available at drug stores without a prescription for women over 18 years old.   A copper intrauterine device (IUD) is another type of emergency birth control. The IUD may be put into the uterus by your healthcare provider within 5 days after unprotected sex. It may prevent pregnancy by stopping fertilization or keeping a fertilized egg from attaching to the womb.  How well do the various methods prevent pregnancy?   The following chart shows the typical failure rates of the different kinds of birth control methods. The failure rate is the number of pregnancies expected per 100 women during 1 year of using each method. The failure rate can depend on how correctly each method is followed. If a method is used perfectly, the failure rate is lower than the typical rate shown here. Use of more than 1 method (for example, birth control pills and condoms) can decrease the chances of failure.                               Percentage of Women Having an     Birth Control             Unintended Pregnancy within the        Method                       First Year of Use    -----------------------------------------------------------                                   Typical Use    Perfect Use    -----------------------------------------------------------   Spermicides                         29%          18%   Natural Family Planning     (Periodic Abstinence)                           9%     Symptothermal method                            2%   Withdrawal                          18%           4%   Diaphragm with Spermicide           16%           6%   Condom     Female                            27%           5%     Male                              17%           2%   Sponge     Women who have given birth        32%          26%     Women who have not given birth    16%           9%   Pill                                  9%           0.3%   IUD     with copper                        1%           0.6%     with hormones                      0.1%         0.1%   Shot (Depo-Provera)                  7%           0.3%   Implant                              1%           0.05%   Patch (Ortho Evra)                   8%           0.3%   Vaginal ring (NuvaRing)              8%           0.3%   Female Sterilization                 0.7%         0.5%   Male Sterilization                   0.2%         0.1%   No Method                           85%          85%   -----------------------------------------------------------      Note: These failure rates are modified from the [employer] [place] January 2008. Facts on Contraceptive Use. Available at http://www.[employer].org/pubs/fb_contr_use.html

## 2018-06-15 ASSESSMENT — PATIENT HEALTH QUESTIONNAIRE - PHQ9: SUM OF ALL RESPONSES TO PHQ QUESTIONS 1-9: 25

## 2018-06-15 ASSESSMENT — ANXIETY QUESTIONNAIRES: GAD7 TOTAL SCORE: 21

## 2018-06-28 NOTE — TELEPHONE ENCOUNTER
MEDICATION APPEAL DENIED    Medication: Appeal initiated DENIED    Denial Date: 6/28/2018    Denial Rational: Weight loss medication is not covered by any Medicare plans.            Second Level Appeal Information:     Second level appeals will be managed by the clinic staff and provider. Please contact the [employer] Prior Authorization Team if additional information about the denial is needed.

## 2018-06-29 NOTE — TELEPHONE ENCOUNTER
Please call patient     I am not sure what else do to. I have written letter of medical necessity and her insurance still came back that no weight loss medications will be covered.     Bassem Oneill PA-C  [facility]

## 2018-07-05 DIAGNOSIS — E66.01 MORBID OBESITY (H): ICD-10-CM

## 2018-07-05 RX ORDER — PHENTERMINE HYDROCHLORIDE 37.5 MG/1
37.5 CAPSULE ORAL EVERY MORNING
Qty: 30 CAPSULE | Refills: 0 | Status: SHIPPED | OUTPATIENT
Start: 2018-07-12 | End: 2018-07-27

## 2018-07-05 NOTE — TELEPHONE ENCOUNTER
Patient notified rx ready. She states she already has a printed rx and to disregard this request. Rx placed in [facility].  Nenita Chiu, CMA

## 2018-07-05 NOTE — TELEPHONE ENCOUNTER
Patient is about 1 week early. Due for fill 7/12/18. I have signed RX with that date due to controlled substance.    Bassem Jim-ARIA Bella  [facility]

## 2018-07-05 NOTE — TELEPHONE ENCOUNTER
phentermine      Last Written Prescription Date:  6/14/18  Last Fill Quantity: 30,   # refills: 0  Last Office Visit: 6/14/18  Future Office visit:       Routing refill request to provider for review/approval because:  Drug not on the G, P or [facility] refill protocol or controlled substance

## 2018-07-27 DIAGNOSIS — E66.01 MORBID OBESITY (H): ICD-10-CM

## 2018-07-27 RX ORDER — PHENTERMINE HYDROCHLORIDE 37.5 MG/1
37.5 CAPSULE ORAL EVERY MORNING
Qty: 30 CAPSULE | Refills: 0 | Status: SHIPPED | OUTPATIENT
Start: 2018-08-09 | End: 2019-01-07

## 2018-07-27 NOTE — TELEPHONE ENCOUNTER
phentermine      Last Written Prescription Date:  7/12/18  Last Fill Quantity: 30,   # refills: 0  Last Office Visit: 6/14/18  Future Office visit:       Routing refill request to provider for review/approval because:  Drug not on the G, P or [facility] refill protocol or controlled substance

## 2018-07-27 NOTE — TELEPHONE ENCOUNTER
Not due for monthly fill until 8/9/18. Signed rx with that date.     Bassem Oneill PA-C  [facility]

## 2018-09-14 ENCOUNTER — ALLIED HEALTH/NURSE VISIT (OUTPATIENT)
Dept: FAMILY MEDICINE | Facility: OTHER | Age: 43
End: 2018-09-14
Payer: COMMERCIAL

## 2018-09-14 DIAGNOSIS — Z23 NEED FOR PROPHYLACTIC VACCINATION AND INOCULATION AGAINST INFLUENZA: Primary | ICD-10-CM

## 2018-09-14 PROCEDURE — 90686 IIV4 VACC NO PRSV 0.5 ML IM: CPT

## 2018-09-14 PROCEDURE — 90471 IMMUNIZATION ADMIN: CPT

## 2018-09-14 PROCEDURE — 99207 ZZC NO CHARGE NURSE ONLY: CPT

## 2018-09-14 NOTE — MR AVS SNAPSHOT
After Visit Summary   9/14/2018    Andree Kennedy    MRN: [id number]           Patient Information     Date Of Birth          1975        Visit Information        Provider Department      9/14/2018 11:00 AM NL FLU SHOT ERC [facility]        Today's Diagnoses     Need for prophylactic vaccination and inoculation against influenza    -  1       Follow-ups after your visit        Your next 10 appointments already scheduled     Sep 14, 2018 11:00 AM CDT   Nurse Only with NL FLU SHOT ERC   [facility]    [address]  [facility] [id number] [phone]            Nov 13, 2018  8:30 AM CST   PHYSICAL with FRAN Cabrera CNM   [facility]    [address] [facility] [id number] [phone]              Who to contact     If you have questions or need follow up information about today's clinic visit or your schedule please contact [facility] directly at [phone].  Normal or non-critical lab and imaging results will be communicated to you by BitDefender[employer], letter or phone within 4 business days after the clinic has received the results. If you do not hear from us within 7 days, please contact the clinic through BitDefenderhart or phone. If you have a critical or abnormal lab result, we will notify you by phone as soon as possible.  Submit refill requests through [employer] or call your pharmacy and they will forward the refill request to us. Please allow 3 business days for your refill to be completed.          Additional Information About Your Visit        BitDefenderhart Information     [employer] gives you secure access to your electronic health record. If you see a primary care provider, you can also send messages to your care team and make appointments. If you have questions, please call your primary care clinic.  If you do not have a primary care provider, please call  [phone] and they will assist you.        Care EveryWhere ID     This is your Care EveryWhere ID. This could be used by other organizations to access your [facility] medical records  [id number]         Blood Pressure from Last 3 Encounters:   06/14/18 140/88   06/06/18 132/88   06/03/18 (!) 147/102    Weight from Last 3 Encounters:   06/14/18 228 lb (103.4 kg)   06/06/18 229 lb 12.8 oz (104.2 kg)   02/08/18 235 lb 9.6 oz (106.9 kg)              We Performed the Following     FLU VACCINE, SPLIT VIRUS, IM (QUADRIVALENT) [50509]- >3 YRS     Vaccine Administration, Initial [90741]        Primary Care Provider Office Phone # Fax #    Letty GILBERT Oneill PA-C [phone]       [address] [facility] [address]  [facility] [address]        Equal Access to Services     ERIC VALENTIN : Hadii catrachita ku hadasho Soomaali, waaxda luqadaha, qaybta kaalmada adeegyada, waxay luin hayarchana avila . So [facility] [phone].    ATENCIÓN: Si habla español, tiene a rivers disposición servicios gratuitos de asistencia lingüística. Llame al [phone].    We comply with applicable federal civil rights laws and [place] laws. We do not discriminate on the basis of race, color, national origin, age, disability, sex, sexual orientation, or gender identity.            Thank you!     Thank you for choosing [facility]  for your care. Our goal is always to provide you with excellent care. Hearing back from our patients is one way we can continue to improve our services. Please take a few minutes to complete the written survey that you may receive in the mail after your visit with us. Thank you!             Your Updated Medication List - Protect others around you: Learn how to safely use, store and throw away your medicines at www.disposemymeds.org.          This list is accurate as of 9/14/18 10:43 AM.  Always use your most recent med list.                   Brand Name Dispense Instructions for use Diagnosis     ALLEGRA ALLERGY PO      Take 30 mg by mouth daily        buPROPion 150 MG 24 hr tablet    WELLBUTRIN XL          fexofenadine-pseudoePHEDrine 180-240 MG per 24 hr tablet    ALLEGRA-D 24    28 tablet    Take 1 tablet by mouth daily    Chronic seasonal allergic rhinitis due to pollen       fluticasone 50 MCG/ACT spray    FLONASE    1 Bottle    Spray 2 sprays into both nostrils daily    Acute seasonal allergic rhinitis, unspecified trigger       LATUDA PO      Take 40 mg by mouth daily        phentermine 37.5 MG capsule     30 capsule    Take 1 capsule (37.5 mg) by mouth every morning    Morbid obesity (H)       propranolol 10 MG tablet    INDERAL    30 tablet    Take 1 tablet (10 mg) by mouth 2 times daily    HTN, goal below 140/90       traZODone 50 MG tablet    DESYREL          XANAX PO      Take by mouth every 4 hours as needed for anxiety        ZOLOFT PO      Take 100 mg by mouth daily

## 2018-09-14 NOTE — NURSING NOTE
Injectable Influenza Immunization Documentation    1.  Is the person to be vaccinated sick today?  No    2. Does the person to be vaccinated have an allergy to eggs or to a component of the vaccine?  No    3. Has the person to be vaccinated today ever had a serious reaction to influenza vaccine in the past?  No    4. Has the person to be vaccinated ever had Guillain-Beulah syndrome?  No    Prior to injection verified patient identity using patient's name and date of birth.  Patient instructed to remain in clinic for 15 minutes afterwards, and to report any adverse reaction to me immediately.     Form completed by Brea Jacobsen [facility]

## 2018-09-27 ENCOUNTER — OFFICE VISIT (OUTPATIENT)
Dept: FAMILY MEDICINE | Facility: OTHER | Age: 43
End: 2018-09-27
Payer: COMMERCIAL

## 2018-09-27 VITALS
WEIGHT: 231.2 LBS | SYSTOLIC BLOOD PRESSURE: 128 MMHG | RESPIRATION RATE: 16 BRPM | BODY MASS INDEX: 36.21 KG/M2 | HEART RATE: 74 BPM | DIASTOLIC BLOOD PRESSURE: 88 MMHG | OXYGEN SATURATION: 98 % | TEMPERATURE: 98.3 F

## 2018-09-27 DIAGNOSIS — H60.501 ACUTE OTITIS EXTERNA OF RIGHT EAR, UNSPECIFIED TYPE: ICD-10-CM

## 2018-09-27 DIAGNOSIS — H66.001 ACUTE SUPPURATIVE OTITIS MEDIA OF RIGHT EAR WITHOUT SPONTANEOUS RUPTURE OF TYMPANIC MEMBRANE, RECURRENCE NOT SPECIFIED: Primary | ICD-10-CM

## 2018-09-27 PROCEDURE — 99213 OFFICE O/P EST LOW 20 MIN: CPT | Performed by: PHYSICIAN ASSISTANT

## 2018-09-27 RX ORDER — CIPROFLOXACIN 0.5 MG/.25ML
1 SOLUTION/ DROPS AURICULAR (OTIC) 2 TIMES DAILY
Qty: 3.5 ML | Refills: 0 | Status: SHIPPED | OUTPATIENT
Start: 2018-09-27 | End: 2018-10-04

## 2018-09-27 RX ORDER — AZITHROMYCIN 250 MG/1
TABLET, FILM COATED ORAL
Qty: 6 TABLET | Refills: 0 | Status: SHIPPED | OUTPATIENT
Start: 2018-09-27 | End: 2018-11-13

## 2018-09-27 ASSESSMENT — PAIN SCALES - GENERAL: PAINLEVEL: NO PAIN (0)

## 2018-09-27 NOTE — PROGRESS NOTES
SUBJECTIVE:   Andree Kennedy is a 43 year old female who presents to clinic today for the following health issues:    HPI  Acute Illness   Acute illness concerns: Ear infection  Onset: off and on since beginning of august    Fever: no    Chills/Sweats: no    Headache (location?): Yes    Sinus Pressure:no    Conjunctivitis:  no    Ear Pain: YES- just discomfort more on the right     Rhinorrhea: no    Congestion: no    Sore Throat: no     Cough: no    Wheeze: no    Decreased Appetite: no    Nausea: no    Vomiting: no    Diarrhea:  no    Dysuria/Freq.: no    Fatigue/Achiness: no    Sick/Strep Exposure: no     Therapies Tried and outcome: none        Problem list and histories reviewed & adjusted, as indicated.  Additional history: as documented      ROS:  Constitutional, HEENT, cardiovascular, pulmonary, gi and gu systems are negative, except as otherwise noted.    OBJECTIVE:   /88  Pulse 74  Temp 98.3  F (36.8  C) (Temporal)  Resp 16  Wt 231 lb 3.2 oz (104.9 kg)  LMP 09/11/2018  SpO2 98%  BMI 36.21 kg/m2  Body mass index is 36.21 kg/(m^2).  GENERAL APPEARANCE: very mildly ill appearing, alert and no distress  EYES: Eyes grossly normal to inspection, PERRLA, conjunctivae and sclerae without injection or discharge, EOM intact   HENT: Left ear canals without erythema or cerumen, right ear canal erythematous with white discharge, no cerumen,  left TM pearly grey with normal light reflex, no effusion, injection, or bulging, right TM with partial purulent and partial serous effusion and mild injection, no bulging, nasal turbinates without swelling or erythema, clear nasal discharge, mouth without ulcers or lesions, oropharynx clear and oral mucous membranes moist, no sinus tenderness   NECK: Bilateral anterior cervical adenopathy, no adenopathy in posterior cervical or supraclavicular regions  RESP: Lungs clear to auscultation - no rales, rhonchi or wheezes   CV: Regular rates and rhythm, normal S1 S2, no  S3 or S4, no murmur, click or rub  MS: No musculoskeletal defects are noted and gait is age appropriate without ataxia   SKIN: No suspicious lesions or rashes, hydration status appears adeuqate with normal skin turgor   PSYCH: Alert and oriented x3; speech- coherent , normal rate and volume; able to articulate logical thoughts, able to abstract reason, no tangential thoughts, no hallucinations or delusions, mentation appears normal, Mood is euthymic. Affect is appropriate for this mood state and bright. Thought content is free of suicidal ideation, hallucinations, and delusions. Dress is adequate and upkept. Eye contact is good during conversation.       Diagnostic Test Results:  none     ASSESSMENT/PLAN:       ICD-10-CM    1. Acute suppurative otitis media of right ear without spontaneous rupture of tympanic membrane, recurrence not specified H66.001 azithromycin (ZITHROMAX) 250 MG tablet   2. Acute otitis externa of right ear, unspecified type H60.501 azithromycin (ZITHROMAX) 250 MG tablet     ciprofloxacin (CETRAXAL) 0.2 % otic solution     - Discussed exam findings of canal and inner ear infection  - Recommend treatment for both  - Discussed antibiotic use, duration, and side effects  - Recheck 10-14 days if not improved   - Rest and fluids   - Nothing besides drops in ear     The patient indicates understanding of these issues and agrees with the plan.    Follow up: WAGNER Oneill PA-C  [facility]

## 2018-09-27 NOTE — MR AVS SNAPSHOT
After Visit Summary   9/27/2018    Andree Kennedy    MRN: [id number]           Patient Information     Date Of Birth          1975        Visit Information        Provider Department      9/27/2018 10:15 AM Letty Oneill PA-C [facility]        Today's Diagnoses     Acute suppurative otitis media of right ear without spontaneous rupture of tympanic membrane, recurrence not specified    -  1    Acute otitis externa of right ear, unspecified type           Follow-ups after your visit        Follow-up notes from your care team     Return in about 3 months (around 12/27/2018).      Your next 10 appointments already scheduled     Nov 13, 2018  8:30 AM CST   PHYSICAL with FRAN Cabrera CNM   [facility] ([facility])    [address] [employer] [address] [phone]              Who to contact     If you have questions or need follow up information about today's clinic visit or your schedule please contact [facility] directly at [phone].  Normal or non-critical lab and imaging results will be communicated to you by LocoMobihart, letter or phone within 4 business days after the clinic has received the results. If you do not hear from us within 7 days, please contact the clinic through LocoMobihart or phone. If you have a critical or abnormal lab result, we will notify you by phone as soon as possible.  Submit refill requests through [employer] or call your pharmacy and they will forward the refill request to us. Please allow 3 business days for your refill to be completed.          Additional Information About Your Visit        LocoMobihart Information     [employer] gives you secure access to your electronic health record. If you see a primary care provider, you can also send messages to your care team and make appointments. If you have questions, please call your primary care clinic.  If you do not have a primary care  provider, please call [phone] and they will assist you.        Care EveryWhere ID     This is your Care EveryWhere ID. This could be used by other organizations to access your [facility] medical records  [id number]        Your Vitals Were     Pulse Temperature Respirations Last Period Pulse Oximetry BMI (Body Mass Index)    74 98.3  F (36.8  C) (Temporal) 16 09/11/2018 98% 36.21 kg/m2       Blood Pressure from Last 3 Encounters:   09/27/18 128/88   06/14/18 140/88   06/06/18 132/88    Weight from Last 3 Encounters:   09/27/18 231 lb 3.2 oz (104.9 kg)   06/14/18 228 lb (103.4 kg)   06/06/18 229 lb 12.8 oz (104.2 kg)              Today, you had the following     No orders found for display         Today's Medication Changes          These changes are accurate as of 9/27/18 11:00 AM.  If you have any questions, ask your nurse or doctor.               Start taking these medicines.        Dose/Directions    azithromycin 250 MG tablet   Commonly known as:  ZITHROMAX   Used for:  Acute suppurative otitis media of right ear without spontaneous rupture of tympanic membrane, recurrence not specified, Acute otitis externa of right ear, unspecified type   Started by:  Letty Oneill PA-C        Two tablets first day, then one tablet daily for four days.   Quantity:  6 tablet   Refills:  0       ciprofloxacin 0.2 % otic solution   Commonly known as:  CETRAXAL   Used for:  Acute otitis externa of right ear, unspecified type   Started by:  Letty Oneill PA-C        Dose:  1 Dropperette   Place 0.25 mLs (1 Dropperette) into the right ear 2 times daily for 7 days   Quantity:  3.5 mL   Refills:  0         Stop taking these medicines if you haven't already. Please contact your care team if you have questions.     propranolol 10 MG tablet   Commonly known as:  INDERAL   Stopped by:  Letty Oneill PA-C                Where to get your medicines      These medications were sent to  [facility] [address] [place], [place] - [address] [facility]  [address] [facility] [address]     Phone:  [phone]     azithromycin 250 MG tablet    ciprofloxacin 0.2 % otic solution                Primary Care Provider Office Phone # Fax #    Letty HUNT ARIA Oneill [phone]       [address] [place] [address] [address]  [facility] [address]        Equal Access to Services     ERIC VALENTIN : Hadii aad ku hadasho Soomaali, waaxda luqadaha, qaybta kaalmada adeegyada, waxay idiin hayaan adeeg kharanorma lajose . So [facility] [phone].    ATENCIÓN: Si habla español, tiene a rivers disposición servicios gratuitos de asistencia lingüística. [facility] [phone].    We comply with applicable federal civil rights laws and [place] laws. We do not discriminate on the basis of race, color, national origin, age, disability, sex, sexual orientation, or gender identity.            Thank you!     Thank you for choosing [facility]  for your care. Our goal is always to provide you with excellent care. Hearing back from our patients is one way we can continue to improve our services. Please take a few minutes to complete the written survey that you may receive in the mail after your visit with us. Thank you!             Your Updated Medication List - Protect others around you: Learn how to safely use, store and throw away your medicines at www.disposemymeds.org.          This list is accurate as of 9/27/18 11:00 AM.  Always use your most recent med list.                   Brand Name Dispense Instructions for use Diagnosis    ALLEGRA ALLERGY PO      Take 30 mg by mouth daily        azithromycin 250 MG tablet    ZITHROMAX    6 tablet    Two tablets first day, then one tablet daily for four days.    Acute suppurative otitis media of right ear without spontaneous rupture of tympanic membrane, recurrence not specified, Acute otitis externa of right ear, unspecified type       buPROPion 150 MG 24 hr tablet     WELLBUTRIN XL          ciprofloxacin 0.2 % otic solution    CETRAXAL    3.5 mL    Place 0.25 mLs (1 Dropperette) into the right ear 2 times daily for 7 days    Acute otitis externa of right ear, unspecified type       fexofenadine-pseudoePHEDrine 180-240 MG per 24 hr tablet    ALLEGRA-D 24    28 tablet    Take 1 tablet by mouth daily    Chronic seasonal allergic rhinitis due to pollen       LATUDA PO      Take 40 mg by mouth daily        phentermine 37.5 MG capsule     30 capsule    Take 1 capsule (37.5 mg) by mouth every morning    Morbid obesity (H)       traZODone 50 MG tablet    DESYREL          XANAX PO      Take by mouth every 4 hours as needed for anxiety        ZOLOFT PO      Take 100 mg by mouth daily

## 2018-11-07 NOTE — PATIENT INSTRUCTIONS
Preventive Health Recommendations  Female Ages 40 to 49    Yearly exam:     See your health care provider every year in order to  1. Review health changes.   2. Discuss preventive care.    3. Review your medicines if your doctor prescribed any.      Get a Pap test every three years (unless you have an abnormal result and your provider advises testing more often).      If you get Pap tests with HPV test, you only need to test every 5 years, unless you have an abnormal result. You do not need a Pap test if your uterus was removed (hysterectomy) and you have not had cancer.      You should be tested each year for STDs (sexually transmitted diseases), if you're at risk.     Ask your doctor if you should have a mammogram.      Have a colonoscopy (test for colon cancer) if someone in your family has had colon cancer or polyps before age 50.       Have a cholesterol test every 5 years.       Have a diabetes test (fasting glucose) after age 45. If you are at risk for diabetes, you should have this test every 3 years.    Shots: Get a flu shot each year. Get a tetanus shot every 10 years.     Nutrition:     Eat at least 5 servings of fruits and vegetables each day.    Eat whole-grain bread, whole-wheat pasta and brown rice instead of white grains and rice.    Get adequate Calcium and Vitamin D.      Lifestyle    Exercise at least 150 minutes a week (an average of 30 minutes a day, 5 days a week). This will help you control your weight and prevent disease.    Limit alcohol to one drink per day.    No smoking.     Wear sunscreen to prevent skin cancer.    See your dentist every six months for an exam and cleaning.    Preventive Health Recommendations  Female Ages 40 to 49    Yearly exam:     See your health care provider every year in order to  4. Review health changes.   5. Discuss preventive care.    6. Review your medicines if your doctor prescribed any.      Get a Pap test every three years (unless you have an abnormal  result and your provider advises testing more often).      If you get Pap tests with HPV test, you only need to test every 5 years, unless you have an abnormal result. You do not need a Pap test if your uterus was removed (hysterectomy) and you have not had cancer.      You should be tested each year for STDs (sexually transmitted diseases), if you're at risk.     Ask your doctor if you should have a mammogram.      Have a colonoscopy (test for colon cancer) if someone in your family has had colon cancer or polyps before age 50.       Have a cholesterol test every 5 years.       Have a diabetes test (fasting glucose) after age 45. If you are at risk for diabetes, you should have this test every 3 years.    Shots: Get a flu shot each year. Get a tetanus shot every 10 years.     Nutrition:     Eat at least 5 servings of fruits and vegetables each day.    Eat whole-grain bread, whole-wheat pasta and brown rice instead of white grains and rice.    Get adequate Calcium and Vitamin D.      Lifestyle    Exercise at least 150 minutes a week (an average of 30 minutes a day, 5 days a week). This will help you control your weight and prevent disease.    Limit alcohol to one drink per day.    No smoking.     Wear sunscreen to prevent skin cancer.    See your dentist every six months for an exam and cleaning.    Preventive Health Recommendations  Female Ages 40 to 49    Yearly exam:     See your health care provider every year in order to  7. Review health changes.   8. Discuss preventive care.    9. Review your medicines if your doctor prescribed any.      Get a Pap test every three years (unless you have an abnormal result and your provider advises testing more often).      If you get Pap tests with HPV test, you only need to test every 5 years, unless you have an abnormal result. You do not need a Pap test if your uterus was removed (hysterectomy) and you have not had cancer.      You should be tested each year for STDs  (sexually transmitted diseases), if you're at risk.     Ask your doctor if you should have a mammogram.      Have a colonoscopy (test for colon cancer) if someone in your family has had colon cancer or polyps before age 50.       Have a cholesterol test every 5 years.       Have a diabetes test (fasting glucose) after age 45. If you are at risk for diabetes, you should have this test every 3 years.    Shots: Get a flu shot each year. Get a tetanus shot every 10 years.     Nutrition:     Eat at least 5 servings of fruits and vegetables each day.    Eat whole-grain bread, whole-wheat pasta and brown rice instead of white grains and rice.    Get adequate Calcium and Vitamin D.      Lifestyle    Exercise at least 150 minutes a week (an average of 30 minutes a day, 5 days a week). This will help you control your weight and prevent disease.    Limit alcohol to one drink per day.    No smoking.     Wear sunscreen to prevent skin cancer.    See your dentist every six months for an exam and cleaning.

## 2018-11-13 ENCOUNTER — OFFICE VISIT (OUTPATIENT)
Dept: OBGYN | Facility: OTHER | Age: 43
End: 2018-11-13
Payer: COMMERCIAL

## 2018-11-13 VITALS
HEART RATE: 88 BPM | DIASTOLIC BLOOD PRESSURE: 80 MMHG | WEIGHT: 230.75 LBS | SYSTOLIC BLOOD PRESSURE: 122 MMHG | BODY MASS INDEX: 37.09 KG/M2 | HEIGHT: 66 IN

## 2018-11-13 DIAGNOSIS — R87.612 PAPANICOLAOU SMEAR OF CERVIX WITH LOW GRADE SQUAMOUS INTRAEPITHELIAL LESION (LGSIL): ICD-10-CM

## 2018-11-13 DIAGNOSIS — Z01.419 ENCOUNTER FOR WELL WOMAN EXAM WITH ROUTINE GYNECOLOGICAL EXAM: Primary | ICD-10-CM

## 2018-11-13 DIAGNOSIS — Z12.31 VISIT FOR SCREENING MAMMOGRAM: ICD-10-CM

## 2018-11-13 PROCEDURE — 87624 HPV HI-RISK TYP POOLED RSLT: CPT | Performed by: ADVANCED PRACTICE MIDWIFE

## 2018-11-13 PROCEDURE — 99396 PREV VISIT EST AGE 40-64: CPT | Performed by: ADVANCED PRACTICE MIDWIFE

## 2018-11-13 PROCEDURE — 88175 CYTOPATH C/V AUTO FLUID REDO: CPT | Performed by: ADVANCED PRACTICE MIDWIFE

## 2018-11-13 PROCEDURE — 88141 CYTOPATH C/V INTERPRET: CPT | Performed by: ADVANCED PRACTICE MIDWIFE

## 2018-11-13 NOTE — MR AVS SNAPSHOT
After Visit Summary   11/13/2018    Andree Kennedy    MRN: [id number]           Patient Information     Date Of Birth          1975        Visit Information        Provider Department      11/13/2018 8:30 AM Arelis Melgar APRN CNM [facility]        Today's Diagnoses     Encounter for well woman exam with routine gynecological exam    -  1    Visit for screening mammogram        Papanicolaou smear of cervix with low grade squamous intraepithelial lesion (LGSIL)          Care Instructions      Preventive Health Recommendations  Female Ages 40 to 49    Yearly exam:     See your health care provider every year in order to  1. Review health changes.   2. Discuss preventive care.    3. Review your medicines if your doctor prescribed any.      Get a Pap test every three years (unless you have an abnormal result and your provider advises testing more often).      If you get Pap tests with HPV test, you only need to test every 5 years, unless you have an abnormal result. You do not need a Pap test if your uterus was removed (hysterectomy) and you have not had cancer.      You should be tested each year for STDs (sexually transmitted diseases), if you're at risk.     Ask your doctor if you should have a mammogram.      Have a colonoscopy (test for colon cancer) if someone in your family has had colon cancer or polyps before age 50.       Have a cholesterol test every 5 years.       Have a diabetes test (fasting glucose) after age 45. If you are at risk for diabetes, you should have this test every 3 years.    Shots: Get a flu shot each year. Get a tetanus shot every 10 years.     Nutrition:     Eat at least 5 servings of fruits and vegetables each day.    Eat whole-grain bread, whole-wheat pasta and brown rice instead of white grains and rice.    Get adequate Calcium and Vitamin D.      Lifestyle    Exercise at least 150 minutes a week (an average of 30 minutes a day, 5 days a week).  This will help you control your weight and prevent disease.    Limit alcohol to one drink per day.    No smoking.     Wear sunscreen to prevent skin cancer.    See your dentist every six months for an exam and cleaning.    Preventive Health Recommendations  Female Ages 40 to 49    Yearly exam:     See your health care provider every year in order to  4. Review health changes.   5. Discuss preventive care.    6. Review your medicines if your doctor prescribed any.      Get a Pap test every three years (unless you have an abnormal result and your provider advises testing more often).      If you get Pap tests with HPV test, you only need to test every 5 years, unless you have an abnormal result. You do not need a Pap test if your uterus was removed (hysterectomy) and you have not had cancer.      You should be tested each year for STDs (sexually transmitted diseases), if you're at risk.     Ask your doctor if you should have a mammogram.      Have a colonoscopy (test for colon cancer) if someone in your family has had colon cancer or polyps before age 50.       Have a cholesterol test every 5 years.       Have a diabetes test (fasting glucose) after age 45. If you are at risk for diabetes, you should have this test every 3 years.    Shots: Get a flu shot each year. Get a tetanus shot every 10 years.     Nutrition:     Eat at least 5 servings of fruits and vegetables each day.    Eat whole-grain bread, whole-wheat pasta and brown rice instead of white grains and rice.    Get adequate Calcium and Vitamin D.      Lifestyle    Exercise at least 150 minutes a week (an average of 30 minutes a day, 5 days a week). This will help you control your weight and prevent disease.    Limit alcohol to one drink per day.    No smoking.     Wear sunscreen to prevent skin cancer.    See your dentist every six months for an exam and cleaning.    Preventive Health Recommendations  Female Ages 40 to 49    Yearly exam:     See your health  care provider every year in order to  7. Review health changes.   8. Discuss preventive care.    9. Review your medicines if your doctor prescribed any.      Get a Pap test every three years (unless you have an abnormal result and your provider advises testing more often).      If you get Pap tests with HPV test, you only need to test every 5 years, unless you have an abnormal result. You do not need a Pap test if your uterus was removed (hysterectomy) and you have not had cancer.      You should be tested each year for STDs (sexually transmitted diseases), if you're at risk.     Ask your doctor if you should have a mammogram.      Have a colonoscopy (test for colon cancer) if someone in your family has had colon cancer or polyps before age 50.       Have a cholesterol test every 5 years.       Have a diabetes test (fasting glucose) after age 45. If you are at risk for diabetes, you should have this test every 3 years.    Shots: Get a flu shot each year. Get a tetanus shot every 10 years.     Nutrition:     Eat at least 5 servings of fruits and vegetables each day.    Eat whole-grain bread, whole-wheat pasta and brown rice instead of white grains and rice.    Get adequate Calcium and Vitamin D.      Lifestyle    Exercise at least 150 minutes a week (an average of 30 minutes a day, 5 days a week). This will help you control your weight and prevent disease.    Limit alcohol to one drink per day.    No smoking.     Wear sunscreen to prevent skin cancer.    See your dentist every six months for an exam and cleaning.          Follow-ups after your visit        Future tests that were ordered for you today     Open Future Orders        Priority Expected Expires Ordered    *MA Screening Digital Bilateral Routine  11/7/2019 11/7/2018            Who to contact     If you have questions or need follow up information about today's clinic visit or your schedule please contact [facility] directly at  "[phone].  Normal or non-critical lab and imaging results will be communicated to you by MyChart, letter or phone within 4 business days after the clinic has received the results. If you do not hear from us within 7 days, please contact the clinic through [employer] or phone. If you have a critical or abnormal lab result, we will notify you by phone as soon as possible.  Submit refill requests through [employer] or call your pharmacy and they will forward the refill request to us. Please allow 3 business days for your refill to be completed.          Additional Information About Your Visit        HealOrhart Information     [employer] gives you secure access to your electronic health record. If you see a primary care provider, you can also send messages to your care team and make appointments. If you have questions, please call your primary care clinic.  If you do not have a primary care provider, please call [phone] and they will assist you.        Care EveryWhere ID     This is your Care EveryWhere ID. This could be used by other organizations to access your [facility] medical records  [id number]        Your Vitals Were     Pulse Height Last Period BMI (Body Mass Index)          88 5' 6.25\" (1.683 m) 11/05/2018 (Exact Date) 36.96 kg/m2         Blood Pressure from Last 3 Encounters:   11/13/18 122/80   09/27/18 128/88   06/14/18 140/88    Weight from Last 3 Encounters:   11/13/18 230 lb 12 oz (104.7 kg)   09/27/18 231 lb 3.2 oz (104.9 kg)   06/14/18 228 lb (103.4 kg)              We Performed the Following     HPV High Risk Types DNA Cervical     Pap imaged thin layer diagnostic with HPV (select HPV order below)        Primary Care Provider Office Phone # Fax #    Letty Oneill PA-C [phone]       [address] [facility] [address]  [facility] [address]        Equal Access to Services     ERIC VALENTIN AH: Yajaira luque Somaria, waaxda luqadaha, qaybta kaalcharlene matos, ana lyman " parris moniqueaagenesis ah. So [facility] [phone].    ATENCIÓN: Si indula anna, tiene a rivers disposición servicios gratuitos de asistencia lingüística. [facility] [phone].    We comply with applicable federal civil rights laws and [place] laws. We do not discriminate on the basis of race, color, national origin, age, disability, sex, sexual orientation, or gender identity.            Thank you!     Thank you for choosing [facility]  for your care. Our goal is always to provide you with excellent care. Hearing back from our patients is one way we can continue to improve our services. Please take a few minutes to complete the written survey that you may receive in the mail after your visit with us. Thank you!             Your Updated Medication List - Protect others around you: Learn how to safely use, store and throw away your medicines at www.disposemymeds.org.          This list is accurate as of 11/13/18 11:15 AM.  Always use your most recent med list.                   Brand Name Dispense Instructions for use Diagnosis    ALLEGRA ALLERGY PO      Take 30 mg by mouth daily        buPROPion 150 MG 24 hr tablet    WELLBUTRIN XL          fexofenadine-pseudoePHEDrine 180-240 MG per 24 hr tablet    ALLEGRA-D 24    28 tablet    Take 1 tablet by mouth daily    Chronic seasonal allergic rhinitis due to pollen       LATUDA PO      Take 40 mg by mouth daily        phentermine 37.5 MG capsule     30 capsule    Take 1 capsule (37.5 mg) by mouth every morning    Morbid obesity (H)       traZODone 50 MG tablet    DESYREL          XANAX PO      Take by mouth every 4 hours as needed for anxiety        ZOLOFT PO      Take 100 mg by mouth daily

## 2018-11-13 NOTE — PROGRESS NOTES
Physical               Review of Systems       Physical Exam          SUBJECTIVE:   CC: Andree Kennedy is an 43 year old woman who presents for preventive health visit.     Healthy Habits:    Do you get at least three servings of calcium containing foods daily (dairy, green leafy vegetables, etc.)? no    Amount of exercise or daily activities, outside of work: 2 day(s) per week    Problems taking medications regularly No    Medication side effects: No    Have you had an eye exam in the past two years? yes    Do you see a dentist twice per year? no    Do you have sleep apnea, excessive snoring or daytime drowsiness?no      Right ear pressure      Today's PHQ-2 Score:   PHQ-2 ( 1999 [employer]) 11/13/2018 11/7/2017   Q1: Little interest or pleasure in doing things 3 3   Q2: Feeling down, depressed or hopeless 3 3   PHQ-2 Score 6 6   Q1: Little interest or pleasure in doing things Nearly every day -   Q2: Feeling down, depressed or hopeless Nearly every day -   PHQ-2 Score 6 -       Abuse: Current or Past(Physical, Sexual or Emotional)- No  Do you feel safe in your environment - Yes    Social History   Substance Use Topics     Smoking status: Never Smoker     Smokeless tobacco: Never Used     Alcohol use Yes      Comment: social     If you drink alcohol do you typically have >3 drinks per day or >7 drinks per week? No                     Reviewed orders with patient.  Reviewed health maintenance and updated orders accordingly - Yes  BP Readings from Last 3 Encounters:   11/13/18 122/80   09/27/18 128/88   06/14/18 140/88    Wt Readings from Last 3 Encounters:   11/13/18 230 lb 12 oz (104.7 kg)   09/27/18 231 lb 3.2 oz (104.9 kg)   06/14/18 228 lb (103.4 kg)                  Patient Active Problem List   Diagnosis     Anxiety state     Headache, post-traumatic     Neck pain     Headaches, tension     Cervical radiculopathy     Neural foraminal stenosis of cervical spine     Cervical spondylosis     Postpartum  hypertension     HTN, goal below 140/90     Obesity     Papanicolaou smear of cervix with low grade squamous intraepithelial lesion (LGSIL)     Hyperlipidemia LDL goal <130     Mild episode of recurrent major depressive disorder (H)     Bipolar I disorder (H)     Seasonal affective disorder (H)     Morbid obesity (H)     Chronic seasonal allergic rhinitis     Past Surgical History:   Procedure Laterality Date     NO HISTORY OF SURGERY         Social History   Substance Use Topics     Smoking status: Never Smoker     Smokeless tobacco: Never Used     Alcohol use Yes      Comment: social     Family History   Problem Relation Age of Onset     Cancer - colorectal Mother      polyp removal     Diabetes Father      Cancer Father      Skin     Hypertension Father      HEART DISEASE Father      Unknown/Adopted Maternal Grandmother      Unknown/Adopted Maternal Grandfather      Unknown/Adopted Paternal Grandmother      Unknown/Adopted Paternal Grandfather          Current Outpatient Prescriptions   Medication Sig Dispense Refill     ALPRAZolam (XANAX PO) Take by mouth every 4 hours as needed for anxiety       Fexofenadine HCl (ALLEGRA ALLERGY PO) Take 30 mg by mouth daily       fexofenadine-pseudoePHEDrine (ALLEGRA-D 24) 180-240 MG per 24 hr tablet Take 1 tablet by mouth daily 28 tablet 1     Lurasidone HCl (LATUDA PO) Take 40 mg by mouth daily        Sertraline HCl (ZOLOFT PO) Take 100 mg by mouth daily        traZODone (DESYREL) 50 MG tablet        buPROPion (WELLBUTRIN XL) 150 MG 24 hr tablet        phentermine 37.5 MG capsule Take 1 capsule (37.5 mg) by mouth every morning (Patient not taking: Reported on 11/13/2018) 30 capsule 0       Patient under age 50, mutual decision reflected in health maintenance.      Pertinent mammograms are reviewed under the imaging tab.  History of abnormal Pap smear:   Last 3 Pap and HPV Results:   PAP / HPV Latest Ref Rng & Units 11/7/2017 11/4/2016 11/3/2015   PAP - LSIL(A) LSIL(A)  "LSIL(A)   PAP DATE - [facility] - - - -   HPV 16 DNA NEG:Negative Negative Negative Negative   HPV 18 DNA NEG:Negative Negative Negative Negative   OTHER HR HPV NEG:Negative Positive(A) Positive(A) Positive(A)     PAP / HPV Latest Ref Rng & Units 11/7/2017 11/4/2016 11/3/2015   PAP - LSIL(A) LSIL(A) LSIL(A)   PAP DATE - [facility] - - - -   HPV 16 DNA NEG:Negative Negative Negative Negative   HPV 18 DNA NEG:Negative Negative Negative Negative   OTHER HR HPV NEG:Negative Positive(A) Positive(A) Positive(A)     Reviewed and updated as needed this visit by clinical staff  Tobacco  Allergies  Meds  Med Hx  Surg Hx  Fam Hx  Soc Hx        Reviewed and updated as needed this visit by Provider            ROS:  CONSTITUTIONAL: NEGATIVE for fever, chills, change in weight  INTEGUMENTARU/SKIN: NEGATIVE for worrisome rashes, moles or lesions  EYES: NEGATIVE for vision changes or irritation  ENT: NEGATIVE for ear, mouth and throat problems  RESP: NEGATIVE for significant cough or SOB  BREAST: NEGATIVE for masses, tenderness or discharge  CV: NEGATIVE for chest pain, palpitations or peripheral edema  GI: NEGATIVE for nausea, abdominal pain, heartburn, or change in bowel habits  : NEGATIVE for unusual urinary or vaginal symptoms. Periods are regular.  MUSCULOSKELETAL: NEGATIVE for significant arthralgias or myalgia  NEURO: NEGATIVE for weakness, dizziness or paresthesias  ENDOCRINE: NEGATIVE for temperature intolerance, skin/hair changes  PSYCHIATRIC: NEGATIVE for changes in mood or affect.  Discussed her thoughts of self harm.  Doesn't have a plan, its more that life is sometimes just too much.  Sees her therapist weekly.     OBJECTIVE:   /80 (BP Location: Right arm, Patient Position: Sitting, Cuff Size: Adult Large)  Pulse 88  Ht 5' 6.25\" (1.683 m)  Wt 230 lb 12 oz (104.7 kg)  LMP 11/05/2018 (Exact Date)  BMI 36.96 kg/m2  EXAM:  GENERAL: healthy, alert and no distress  EYES: Eyes grossly normal to inspection, PERRL and " conjunctivae and sclerae normal  HENT: ear canals and TM's normal, nose and mouth without ulcers or lesions  NECK: no adenopathy, no asymmetry, masses, or scars and thyroid normal to palpation  RESP: lungs clear to auscultation - no rales, rhonchi or wheezes  BREAST: normal without masses, tenderness or nipple discharge and no palpable axillary masses or adenopathy  CV: regular rate and rhythm, normal S1 S2, no S3 or S4, no murmur, click or rub, no peripheral edema and peripheral pulses strong  ABDOMEN: soft, nontender, no hepatosplenomegaly, no masses and bowel sounds normal   (female): normal female external genitalia, normal urethral meatus, vaginal mucosa pink, moist, well rugated, and normal cervix/adnexa/uterus without masses or discharge  MS: no gross musculoskeletal defects noted, no edema  SKIN: no suspicious lesions or rashes  NEURO: Normal strength and tone, mentation intact and speech normal  PSYCH: mentation appears normal, affect normal/bright    Diagnostic Test Results:  No results found for this or any previous visit (from the past 24 hour(s)).    ASSESSMENT/PLAN:   (Z01.419) Encounter for well woman exam with routine gynecological exam  (primary encounter diagnosis)  Comment:   Plan: Pap imaged thin layer diagnostic with HPV         (select HPV order below), HPV High Risk Types         DNA Cervical            (Z12.31) Visit for screening mammogram  Comment:   Plan: *MA Screening Digital Bilateral            (R87.612) Papanicolaou smear of cervix with low grade squamous intraepithelial lesion (LGSIL)  Comment:   Plan: HPV High Risk Types DNA Cervical              COUNSELING:   Reviewed preventive health counseling, as reflected in patient instructions       Regular exercise       Healthy diet/nutrition       Contraception       Family planning    Considering an IUD if she doesn't need a leep.   Has also consider gastric sleeve  BP Readings from Last 1 Encounters:   11/13/18 122/80     Estimated  "body mass index is 36.96 kg/(m^2) as calculated from the following:    Height as of this encounter: 5' 6.25\" (1.683 m).    Weight as of this encounter: 230 lb 12 oz (104.7 kg).      Weight management plan: Patient was referred to their PCP to discuss a diet and exercise plan.     reports that she has never smoked. She has never used smokeless tobacco.      Counseling Resources:  ATP IV Guidelines  Pooled Cohorts Equation Calculator  Breast Cancer Risk Calculator  FRAX Risk Assessment  ICSI Preventive Guidelines  Dietary Guidelines for Americans, 2010  USDA's MyPlate  ASA Prophylaxis  Lung CA Screening    Day FRAN Ochoa AKBAR  [facility]  "

## 2018-11-13 NOTE — NURSING NOTE
"Chief Complaint   Patient presents with     Physical       Initial /80 (BP Location: Right arm, Patient Position: Sitting, Cuff Size: Adult Large)  Pulse 88  Ht 5' 6.25\" (1.683 m)  Wt 230 lb 12 oz (104.7 kg)  LMP 11/05/2018 (Exact Date)  BMI 36.96 kg/m2 Estimated body mass index is 36.96 kg/(m^2) as calculated from the following:    Height as of this encounter: 5' 6.25\" (1.683 m).    Weight as of this encounter: 230 lb 12 oz (104.7 kg).  Medication Reconciliation: complete    Mary Street CMA    "

## 2018-11-15 LAB
COPATH REPORT: ABNORMAL
PAP: ABNORMAL

## 2018-11-20 LAB
FINAL DIAGNOSIS: NORMAL
HPV HR 12 DNA CVX QL NAA+PROBE: NEGATIVE
HPV16 DNA SPEC QL NAA+PROBE: NEGATIVE
HPV18 DNA SPEC QL NAA+PROBE: NEGATIVE
SPECIMEN DESCRIPTION: NORMAL
SPECIMEN SOURCE CVX/VAG CYTO: NORMAL

## 2018-11-27 ENCOUNTER — TELEPHONE (OUTPATIENT)
Dept: OBGYN | Facility: OTHER | Age: 43
End: 2018-11-27

## 2018-11-27 NOTE — TELEPHONE ENCOUNTER
Reason for Call:  Other appointment    Detailed comments: pt scheduled 12/20/18 for colposcopy with Rakesh. Pt states if able to see pt sooner at [place] or [place] she would like to , if not ok with 12/20. Please advise    Phone Number Patient can be reached at: Home number on file [phone] (home)    Best Time: ANY    Can we leave a detailed message on this number? YES    Call taken on 11/27/2018 at 2:39 PM by Eleonora Fiore

## 2018-11-29 NOTE — TELEPHONE ENCOUNTER
Spoke with patient and is now changed to 12/10 at 1:30pm    Fatoumata Hill  Women's Health

## 2019-01-07 ENCOUNTER — OFFICE VISIT (OUTPATIENT)
Dept: OBGYN | Facility: OTHER | Age: 44
End: 2019-01-07
Payer: COMMERCIAL

## 2019-01-07 VITALS
SYSTOLIC BLOOD PRESSURE: 128 MMHG | HEIGHT: 66 IN | HEART RATE: 68 BPM | WEIGHT: 237.5 LBS | BODY MASS INDEX: 38.17 KG/M2 | DIASTOLIC BLOOD PRESSURE: 88 MMHG

## 2019-01-07 DIAGNOSIS — R87.612 PAPANICOLAOU SMEAR OF CERVIX WITH LOW GRADE SQUAMOUS INTRAEPITHELIAL LESION (LGSIL): Primary | ICD-10-CM

## 2019-01-07 DIAGNOSIS — Z32.00 PREGNANCY EXAMINATION OR TEST, PREGNANCY UNCONFIRMED: ICD-10-CM

## 2019-01-07 PROCEDURE — 88305 TISSUE EXAM BY PATHOLOGIST: CPT | Performed by: OBSTETRICS & GYNECOLOGY

## 2019-01-07 PROCEDURE — 57454 BX/CURETT OF CERVIX W/SCOPE: CPT | Performed by: OBSTETRICS & GYNECOLOGY

## 2019-01-07 ASSESSMENT — MIFFLIN-ST. JEOR: SCORE: 1753.79

## 2019-01-07 NOTE — PROGRESS NOTES
43 year old   presents for colposcopy.      Indication for procedure LSIL, neg hr HPV  Prior history of cervical dysplasia:     11/3/15 pap LSIL/+ HR HPV. Plan: colposcopy.   11/19/15 colposcopy. ASCUS.  Plan Pap and cotest in 16 LSIL pap/+ HR HPV (not 16 or 18). Plan: colposcopy bef 17.  16 [place] - LSIL.  Plan: Pap and HPV in 12 months.   17 LSIL pap, + HR HPV (not 16 or 18)  17  [place] - Bx negative (cervicitis).  ECC negative.   18 LSIL pap, neg HPV. Plan: colp      Prior Colposcopy history: Yes as above  Prior LEEP:No  No LMP recorded.    Tobacco: No  Gardasil vaccination status:No  She denies the possibility of pregnancy.  She is not sexually active.     Discussed nature of HPV related infection, natural history and association with cervical dysplasia.  Procedure for colposcopy and biopsy was explained to the patient and consent obtained.  All the patient's questions were answered.    PROCEDURE:  COLPOSCOPY  After a procedural timeout was taken, she was positioned in dorsal lithotomy and a speculum was inserted to allow visualization of the cervix. A 5% acetic acid solution was applied to the ectocervix with large swabs. Lugols solution was also applied.  Colposcopic examination was then undertaken of the cervix, distal vaginal canal and vaginal fornices.    FINDINGS:  Physical Exam    Procedures    Cervix: no visible lesions, no mosaicism, no punctation and no abnormal vasculature; Mild acetowhite changes noted after the application of acetic acid at 11-12 oclock, similar to last year.  Normal at 5-7 oclock. Biopsy at 11 oclock (close to 12 oclock).  ECC done.  Hemostasis with Monsels      Vaginal inspection: normal without visible lesions.    Vulvar colposcopy: vulvar colposcopy not performed.    Procedure Summary: Patient tolerated procedure well and colposcopy adequate.    Colposcopic Impression: Mild dysplasia     10 minutes were spent in face to face discussion  regarding her abnormal pap, separate from the procedure.     Plan: call to discuss Pathology results.   Discussed possible LEEP due to persistent LSIL .  Since she is negative for HPV, plan to repeat Pap and HPV in Jan 2020 if mild changes or better.  She can elect to do the LEEP this year if desired.        Tisha Cameron MD

## 2019-01-07 NOTE — NURSING NOTE
"Chief Complaint   Patient presents with     Colposcopy       Initial /88 (BP Location: Left arm, Patient Position: Chair, Cuff Size: Adult Regular)   Pulse 68   Ht 1.684 m (5' 6.3\")   Wt 107.7 kg (237 lb 8 oz)   LMP 2018 (Approximate)   BMI 37.99 kg/m   Estimated body mass index is 37.99 kg/m  as calculated from the following:    Height as of this encounter: 1.684 m (5' 6.3\").    Weight as of this encounter: 107.7 kg (237 lb 8 oz).  BP completed using cuff size: regular        The following HM Due: Mammogram      The following patient reported/Care Every where data was sent to:  P ABSTRACT QUALITY INITIATIVES [[id number]]       orders have been placed         "

## 2019-01-08 ENCOUNTER — TELEPHONE (OUTPATIENT)
Dept: LAB | Facility: OTHER | Age: 44
End: 2019-01-08

## 2019-01-08 NOTE — TELEPHONE ENCOUNTER
Chely from Pathology called about the BX .  One of the specimen container said 12 o'clock  And the paper request said 11 o'clock  please call Chely  Back at [phone] ASAP    Thank you  Tiara Swain (MLT) [facility]

## 2019-01-10 LAB — COPATH REPORT: NORMAL

## 2019-09-05 ENCOUNTER — ALLIED HEALTH/NURSE VISIT (OUTPATIENT)
Dept: FAMILY MEDICINE | Facility: OTHER | Age: 44
End: 2019-09-05
Payer: COMMERCIAL

## 2019-09-05 DIAGNOSIS — Z23 NEED FOR PROPHYLACTIC VACCINATION AND INOCULATION AGAINST INFLUENZA: Primary | ICD-10-CM

## 2019-09-05 PROCEDURE — 90471 IMMUNIZATION ADMIN: CPT

## 2019-09-05 PROCEDURE — 90686 IIV4 VACC NO PRSV 0.5 ML IM: CPT

## 2019-09-05 PROCEDURE — 99207 ZZC NO CHARGE NURSE ONLY: CPT

## 2019-10-07 ENCOUNTER — OFFICE VISIT (OUTPATIENT)
Dept: FAMILY MEDICINE | Facility: OTHER | Age: 44
End: 2019-10-07
Payer: COMMERCIAL

## 2019-10-07 ENCOUNTER — TELEPHONE (OUTPATIENT)
Dept: FAMILY MEDICINE | Facility: OTHER | Age: 44
End: 2019-10-07

## 2019-10-07 VITALS
OXYGEN SATURATION: 98 % | HEIGHT: 67 IN | RESPIRATION RATE: 16 BRPM | DIASTOLIC BLOOD PRESSURE: 92 MMHG | BODY MASS INDEX: 37.79 KG/M2 | SYSTOLIC BLOOD PRESSURE: 132 MMHG | HEART RATE: 85 BPM | WEIGHT: 240.8 LBS | TEMPERATURE: 98.1 F

## 2019-10-07 DIAGNOSIS — F33.0 MILD EPISODE OF RECURRENT MAJOR DEPRESSIVE DISORDER (H): ICD-10-CM

## 2019-10-07 DIAGNOSIS — M54.50 ACUTE BILATERAL LOW BACK PAIN WITHOUT SCIATICA: Primary | ICD-10-CM

## 2019-10-07 PROCEDURE — 99214 OFFICE O/P EST MOD 30 MIN: CPT | Performed by: PHYSICIAN ASSISTANT

## 2019-10-07 RX ORDER — NAPROXEN 500 MG/1
500 TABLET ORAL 2 TIMES DAILY WITH MEALS
Qty: 60 TABLET | Refills: 0 | Status: SHIPPED | OUTPATIENT
Start: 2019-10-07 | End: 2020-01-23

## 2019-10-07 RX ORDER — CYCLOBENZAPRINE HCL 5 MG
5-10 TABLET ORAL 3 TIMES DAILY PRN
Qty: 30 TABLET | Refills: 0 | Status: SHIPPED | OUTPATIENT
Start: 2019-10-07 | End: 2020-01-23

## 2019-10-07 RX ORDER — CARIPRAZINE 3 MG/1
CAPSULE, GELATIN COATED ORAL
Refills: 1 | COMMUNITY
Start: 2019-08-28 | End: 2022-11-16

## 2019-10-07 ASSESSMENT — PATIENT HEALTH QUESTIONNAIRE - PHQ9
10. IF YOU CHECKED OFF ANY PROBLEMS, HOW DIFFICULT HAVE THESE PROBLEMS MADE IT FOR YOU TO DO YOUR WORK, TAKE CARE OF THINGS AT HOME, OR GET ALONG WITH OTHER PEOPLE: EXTREMELY DIFFICULT
SUM OF ALL RESPONSES TO PHQ QUESTIONS 1-9: 27
SUM OF ALL RESPONSES TO PHQ QUESTIONS 1-9: 27

## 2019-10-07 ASSESSMENT — ANXIETY QUESTIONNAIRES
3. WORRYING TOO MUCH ABOUT DIFFERENT THINGS: NEARLY EVERY DAY
6. BECOMING EASILY ANNOYED OR IRRITABLE: NEARLY EVERY DAY
7. FEELING AFRAID AS IF SOMETHING AWFUL MIGHT HAPPEN: NEARLY EVERY DAY
4. TROUBLE RELAXING: NEARLY EVERY DAY
GAD7 TOTAL SCORE: 21
2. NOT BEING ABLE TO STOP OR CONTROL WORRYING: NEARLY EVERY DAY
5. BEING SO RESTLESS THAT IT IS HARD TO SIT STILL: NEARLY EVERY DAY
7. FEELING AFRAID AS IF SOMETHING AWFUL MIGHT HAPPEN: NEARLY EVERY DAY
GAD7 TOTAL SCORE: 21
GAD7 TOTAL SCORE: 21
1. FEELING NERVOUS, ANXIOUS, OR ON EDGE: NEARLY EVERY DAY

## 2019-10-07 ASSESSMENT — MIFFLIN-ST. JEOR: SCORE: 1770.63

## 2019-10-07 NOTE — TELEPHONE ENCOUNTER
Patient was at [facility] and her Rx's were sent to here. Spoke to pharmacist at [facility] and gave verbal meds/sigs.  Ester Pritchard MA

## 2019-10-07 NOTE — PATIENT INSTRUCTIONS
1. Take 5-10 mg cyclobenzaprine as needed three times a day for muscle spasms. Do not drive or operate machinery with this medication.    2. Take 500 mg naproxen twice per day with food. Monitor for upset stomach, dark tarry stools. Can take tyenlol 500 mg every 4-6 hours as needed.     3. Ice, heat, stretching exercises (as provided). Can get TENS unit over the counter.     4. Placed referral for [facility].     Patient Education     Back Exercises: Back Release  Do this exercise on your hands and knees. Keep your knees under your hips and your hands under your shoulders.        Relax your abdominal and buttocks muscles, lift your head, and let your back sag. Be sure to keep your weight evenly distributed. Don t sit back on your hips.     Hold for 5 seconds.    Return to starting position.    Tuck your head and lift (arch) your back.    Hold for 5 seconds    Return to starting position.    Repeat 5 times.  Date Last Reviewed: 3/1/2018    7480-2745 [employer]. [address] [place] [address]. All rights reserved. This information is not intended as a substitute for professional medical care. Always follow your healthcare professional's instructions.           Patient Education     Back Exercises: Knee Lift         To start, lie on your back with your knees bent and feet flat on the floor. Don t press your neck or lower back to the floor. Breathe deeply. You should feel comfortable and relaxed in this position:    Start by tightening your abdominal muscles.    Lift one bent knee off the floor 2 to 4 inches.    Hold for 10 seconds. Return to start position.    Repeat 3 times.    Switch legs.  Date Last Reviewed: 3/1/2018    0374-4283 [employer]. [address] [place] [address]. All rights reserved. This information is not intended as a substitute for professional medical care. Always follow your healthcare professional's instructions.            Patient Education     Back Exercises: Leg Pull    To start, lie on your back with your knees bent and feet flat on the floor. Don t press your neck or lower back to the floor. Breathe deeply. You should feel comfortable and relaxed in this position.    Pull one knee to your chest.    Hold for 30 to 60 seconds. Return to starting position.    Repeat 2 times.    Switch legs.    For a double leg pull, pull both legs to your chest at the same time. Repeat 2 times.  For your safety, check with your healthcare provider before starting an exercise program.  Date Last Reviewed: 3/1/2018    4139-4257 [employer] [address] [place] [address]. All rights reserved. This information is not intended as a substitute for professional medical care. Always follow your healthcare professional's instructions.           Patient Education     Back Exercises: Lower Back Rotation    To start, lie on your back with your knees bent and feet flat on the floor. Don t press your neck or lower back to the floor. Breathe deeply. You should feel comfortable and relaxed in this position.    Drop both knees to one side. Turn your head to the other side. Keep your shoulders flat on the floor.    Do not push through pain.    Hold for 20 seconds.    Slowly switch sides.    Repeat 2 to 5 times.  Date Last Reviewed: 3/1/2018    3569-7291 [employer]. [address] [place] [address]. All rights reserved. This information is not intended as a substitute for professional medical care. Always follow your healthcare professional's instructions.           Patient Education     Lower Body Exercises: Quad Stretch    This exercise stretches  your lower body to help your back. As you work out, don t rush or strain. Stand arm s length from a wall. Place one hand on it.  Here are the other steps to the quad stretch:    With your other hand, grasp your ankle on the same side. Pull the heel toward your buttocks.  Don t arch your back.    Hold for 30 to 60 seconds. Repeat 2 times. Switch legs.  For your safety, check with your healthcare provider before starting an exercise program.   Date Last Reviewed: 11/1/2017 2000-2018 [employer]. [address] [place] [address]. All rights reserved. This information is not intended as a substitute for professional medical care. Always follow your healthcare professional's instructions.           Patient Education     Lumbar Extension (Flexibility)    1. Lie face down on your stomach, forehead on the floor. You can lie on a mat or towel.  2. Bend your arms next to your body and lift your upper body up on your forearms. Your palms and forearms should be flat on the floor. Keep your stomach and hips on the floor.  3. Hold your upper body up with your forearms for 20 seconds. Slowly lower back down to the floor.  4. Repeat 2 times, or as instructed.  Date Last Reviewed: 3/10/2016    1216-9615 [employer]. [address] [place] [address]. All rights reserved. This information is not intended as a substitute for professional medical care. Always follow your healthcare professional's instructions.

## 2019-10-08 ASSESSMENT — ANXIETY QUESTIONNAIRES: GAD7 TOTAL SCORE: 21

## 2019-10-08 ASSESSMENT — PATIENT HEALTH QUESTIONNAIRE - PHQ9: SUM OF ALL RESPONSES TO PHQ QUESTIONS 1-9: 27

## 2019-11-06 ENCOUNTER — HEALTH MAINTENANCE LETTER (OUTPATIENT)
Age: 44
End: 2019-11-06

## 2019-12-11 ENCOUNTER — OFFICE VISIT (OUTPATIENT)
Dept: OBGYN | Facility: OTHER | Age: 44
End: 2019-12-11
Payer: COMMERCIAL

## 2019-12-11 VITALS
WEIGHT: 241 LBS | DIASTOLIC BLOOD PRESSURE: 96 MMHG | SYSTOLIC BLOOD PRESSURE: 146 MMHG | HEART RATE: 96 BPM | BODY MASS INDEX: 38.73 KG/M2 | HEIGHT: 66 IN

## 2019-12-11 DIAGNOSIS — Z01.419 ENCOUNTER FOR WELL WOMAN EXAM WITH ROUTINE GYNECOLOGICAL EXAM: Primary | ICD-10-CM

## 2019-12-11 DIAGNOSIS — R87.612 PAPANICOLAOU SMEAR OF CERVIX WITH LOW GRADE SQUAMOUS INTRAEPITHELIAL LESION (LGSIL): ICD-10-CM

## 2019-12-11 DIAGNOSIS — Z12.31 VISIT FOR SCREENING MAMMOGRAM: ICD-10-CM

## 2019-12-11 PROCEDURE — 99396 PREV VISIT EST AGE 40-64: CPT | Performed by: ADVANCED PRACTICE MIDWIFE

## 2019-12-11 PROCEDURE — 88175 CYTOPATH C/V AUTO FLUID REDO: CPT | Performed by: ADVANCED PRACTICE MIDWIFE

## 2019-12-11 PROCEDURE — 87624 HPV HI-RISK TYP POOLED RSLT: CPT | Performed by: ADVANCED PRACTICE MIDWIFE

## 2019-12-11 PROCEDURE — 88141 CYTOPATH C/V INTERPRET: CPT | Performed by: ADVANCED PRACTICE MIDWIFE

## 2019-12-11 ASSESSMENT — PATIENT HEALTH QUESTIONNAIRE - PHQ9
10. IF YOU CHECKED OFF ANY PROBLEMS, HOW DIFFICULT HAVE THESE PROBLEMS MADE IT FOR YOU TO DO YOUR WORK, TAKE CARE OF THINGS AT HOME, OR GET ALONG WITH OTHER PEOPLE: EXTREMELY DIFFICULT
SUM OF ALL RESPONSES TO PHQ QUESTIONS 1-9: 24
SUM OF ALL RESPONSES TO PHQ QUESTIONS 1-9: 24

## 2019-12-11 ASSESSMENT — ACTIVITIES OF DAILY LIVING (ADL)
CURRENT_FUNCTION: PREPARING MEALS REQUIRES ASSISTANCE
CURRENT_FUNCTION: HOUSEWORK REQUIRES ASSISTANCE
CURRENT_FUNCTION: MONEY MANAGEMENT REQUIRES ASSISTANCE

## 2019-12-11 ASSESSMENT — MIFFLIN-ST. JEOR: SCORE: 1759.92

## 2019-12-11 NOTE — PROGRESS NOTES
"   SUBJECTIVE:   CC: Andree Kennedy is an 44 year old woman who presents for preventive health visit.     Healthy Habits:     In general, how would you rate your overall health?  Excellent    Frequency of exercise:  2-3 days/week    Duration of exercise:  15-30 minutes    Do you usually eat at least 4 servings of fruit and vegetables a day, include whole grains    & fiber and avoid regularly eating high fat or \"junk\" foods?  Yes    Taking medications regularly:  Yes    Medication side effects:  Not applicable    Ability to successfully perform activities of daily living:  Preparing meals requires assistance, housework requires assistance and money management requires assistance    Home Safety:  No safety concerns identified    Hearing Impairment:  Difficulty following a conversation in a noisy restaurant or crowded room, feel that people are mumbling or not speaking clearly and difficulty following dialogue in the theater    In the past 6 months, have you been bothered by leaking of urine?  No    In general, how would you rate your overall mental or emotional health?  Poor      PHQ-2 Total Score: 6    Additional concerns today:  No          Sinus         Today's PHQ-2 Score:   PHQ-2 ( 1999 [employer]) 12/11/2019   Q1: Little interest or pleasure in doing things 3   Q2: Feeling down, depressed or hopeless 3   PHQ-2 Score 6   Q1: Little interest or pleasure in doing things Nearly every day   Q2: Feeling down, depressed or hopeless Nearly every day   PHQ-2 Score 6       Abuse: Current or Past(Physical, Sexual or Emotional)- No  Do you feel safe in your environment? Yes        Social History     Tobacco Use     Smoking status: Never Smoker     Smokeless tobacco: Never Used   Substance Use Topics     Alcohol use: Yes     Comment: social     If you drink alcohol do you typically have >3 drinks per day or >7 drinks per week? No    Alcohol Use 12/11/2019   Prescreen: >3 drinks/day or >7 drinks/week? No   Prescreen: >3 " drinks/day or >7 drinks/week? -   No flowsheet data found.    Reviewed orders with patient.  Reviewed health maintenance and updated orders accordingly - Yes  BP Readings from Last 3 Encounters:   12/11/19 (!) 146/96   10/07/19 (!) 132/92   01/07/19 128/88    Wt Readings from Last 3 Encounters:   12/11/19 109.3 kg (241 lb)   10/07/19 109.2 kg (240 lb 12.8 oz)   01/07/19 107.7 kg (237 lb 8 oz)                  Patient Active Problem List   Diagnosis     Anxiety state     Headache, post-traumatic     Neck pain     Headaches, tension     Cervical radiculopathy     Neural foraminal stenosis of cervical spine     Cervical spondylosis     Postpartum hypertension     HTN, goal below 140/90     Obesity     Papanicolaou smear of cervix with low grade squamous intraepithelial lesion (LGSIL)     Hyperlipidemia LDL goal <130     Mild episode of recurrent major depressive disorder (H)     Bipolar I disorder (H)     Seasonal affective disorder (H)     Morbid obesity (H)     Chronic seasonal allergic rhinitis     Past Surgical History:   Procedure Laterality Date     NO HISTORY OF SURGERY         Social History     Tobacco Use     Smoking status: Never Smoker     Smokeless tobacco: Never Used   Substance Use Topics     Alcohol use: Yes     Comment: social     Family History   Problem Relation Age of Onset     Cancer - colorectal Mother         polyp removal     Diabetes Father      Cancer Father         Skin     Hypertension Father      Heart Disease Father      Unknown/Adopted Maternal Grandmother      Unknown/Adopted Maternal Grandfather      Unknown/Adopted Paternal Grandmother      Unknown/Adopted Paternal Grandfather          Current Outpatient Medications   Medication Sig Dispense Refill     ALPRAZolam (XANAX PO) Take by mouth every 4 hours as needed for anxiety       FLUoxetine (PROZAC) 20 MG capsule TAKE 1 CAPSULE BY MOUTH ONCE DAILY  1     naproxen (NAPROSYN) 500 MG tablet Take 1 tablet (500 mg) by mouth 2 times daily  (with meals) 60 tablet 0     Sertraline HCl (ZOLOFT PO) Take 100 mg by mouth daily        cyclobenzaprine (FLEXERIL) 5 MG tablet Take 1-2 tablets (5-10 mg) by mouth 3 times daily as needed for muscle spasms (Patient not taking: Reported on 12/11/2019) 30 tablet 0     Lurasidone HCl (LATUDA PO) Take 40 mg by mouth daily        VRAYLAR 3 MG CAPS capsule TAKE 1 CAPSULE BY MOUTH ONCE DAILY  1       Mammogram Screening: Patient under age 50, mutual decision reflected in health maintenance.      Pertinent mammograms are reviewed under the imaging tab.  History of abnormal Pap smear:   PAP / HPV Latest Ref Rng & Units 11/13/2018 11/7/2017 11/4/2016   PAP - LSIL(A) LSIL(A) LSIL(A)   PAP DATE - [facility] - - - -   HPV 16 DNA NEG:Negative Negative Negative Negative   HPV 18 DNA NEG:Negative Negative Negative Negative   OTHER HR HPV NEG:Negative Negative Positive(A) Positive(A)     Reviewed and updated as needed this visit by clinical staff  Tobacco  Allergies  Meds  Med Hx  Surg Hx  Fam Hx  Soc Hx        Reviewed and updated as needed this visit by Provider            Review of Systems  CONSTITUTIONAL: NEGATIVE for fever, chills, change in weight  INTEGUMENTARU/SKIN: NEGATIVE for worrisome rashes, moles or lesions  EYES: NEGATIVE for vision changes or irritation  ENT: NEGATIVE for ear, mouth and throat problems.  Feels that she has had allergies and sinus drainage for about a month.  No fevers or purulent drainage.    RESP: NEGATIVE for significant cough or SOB  BREAST: NEGATIVE for masses, tenderness or discharge  CV: NEGATIVE for chest pain, palpitations or peripheral edema  GI: NEGATIVE for nausea, abdominal pain, heartburn, or change in bowel habits  : NEGATIVE for unusual urinary or vaginal symptoms. Periods are regular though perhaps getting a bit closer together.  Is not sexually active and is ok with that  MUSCULOSKELETAL: NEGATIVE for significant arthralgias or myalgia  NEURO: NEGATIVE for weakness, dizziness or  "paresthesias  ENDOCRINE: NEGATIVE for temperature intolerance, skin/hair changes  PSYCHIATRIC: NEGATIVE for changes in mood or affect     OBJECTIVE:   BP (!) 146/96 (BP Location: Right arm, Patient Position: Sitting, Cuff Size: Adult Large)   Pulse 96   Ht 1.676 m (5' 6\")   Wt 109.3 kg (241 lb)   LMP 11/27/2019 (Approximate)   BMI 38.90 kg/m    Physical Exam  GENERAL: healthy, alert and no distress  EYES: Eyes grossly normal to inspection, PERRL and conjunctivae and sclerae normal  HENT: ear canals and TM's normal, nose and mouth without ulcers or lesions  NECK: no adenopathy, no asymmetry, masses, or scars and thyroid normal to palpation  RESP: lungs clear to auscultation - no rales, rhonchi or wheezes  BREAST: normal without masses, tenderness or nipple discharge and no palpable axillary masses or adenopathy  CV: regular rate and rhythm, normal S1 S2, no S3 or S4, no murmur, click or rub, no peripheral edema and peripheral pulses strong  ABDOMEN: soft, nontender, no hepatosplenomegaly, no masses and bowel sounds normal   (female): normal female external genitalia, normal urethral meatus, vaginal mucosa pink, moist, well rugated, and normal cervix without masses or discharge  MS: no gross musculoskeletal defects noted, no edema  SKIN: no suspicious lesions or rashes  NEURO: Normal strength and tone, mentation intact and speech normal  PSYCH: mentation appears normal, affect normal/bright    Diagnostic Test Results:  Labs reviewed in Epic    ASSESSMENT/PLAN:   (Z01.419) Encounter for well woman exam with routine gynecological exam  (primary encounter diagnosis)  Comment:   Plan:     (Z12.31) Visit for screening mammogram  Comment:   Plan: *MA Screening Digital Bilateral            (R87.252) Papanicolaou smear of cervix with low grade squamous intraepithelial lesion (LGSIL)  Comment:   Plan: Pap imaged thin layer diagnostic with HPV         (select HPV order below), HPV High Risk Types         DNA Cervical     " "             COUNSELING:  Reviewed preventive health counseling, as reflected in patient instructions       Vision screening       Contraception       (Wendy)menopause management    Estimated body mass index is 38.9 kg/m  as calculated from the following:    Height as of this encounter: 1.676 m (5' 6\").    Weight as of this encounter: 109.3 kg (241 lb).    Distraught today.  Her son is being bullied at school and is dealing with that.   Has just come from a therapy visit as well.   Recommended that she follow up with her PCP or FP for her allergy/sinus issues.      reports that she has never smoked. She has never used smokeless tobacco.      Counseling Resources:  ATP IV Guidelines  Pooled Cohorts Equation Calculator  Breast Cancer Risk Calculator  FRAX Risk Assessment  ICSI Preventive Guidelines  Dietary Guidelines for Americans, 2010  USDA's MyPlate  ASA Prophylaxis  Lung CA Screening    [facility]  Answers for HPI/ROS submitted by the patient on 12/11/2019   Annual Exam:  If you checked off any problems, how difficult have these problems made it for you to do your work, take care of things at home, or get along with other people?: Extremely difficult  PHQ9 TOTAL SCORE: 24    "

## 2019-12-11 NOTE — PROGRESS NOTES
"Subjective     Andree Kennedy is a 44 year old female who presents to clinic today for the following health issues:    HPI     Acute Illness   Acute illness concerns: sinus  Onset: 3-4 weeks    Fever: no    Chills/Sweats: no    Headache (location?): YES- sinus     Sinus Pressure:YES- post-nasal drainage and facial pain    Conjunctivitis:  no    Ear Pain: YES- \"not directly in ear, but behind it - like neck\"    Rhinorrhea: no    Congestion: YES    Sore Throat: no     Cough: no - \"sometimes will feel crud in throat and I try to cough it up\"    Wheeze: no    Decreased Appetite: YES    Nausea: no    Vomiting: no    Diarrhea:  no    Dysuria/Freq.: no    Fatigue/Achiness: YES    Sick/Strep Exposure: no     Therapies Tried and outcome: Sinus and Congestion medication - took an old antibiotic (amoxicillin) a few days ago    She has a lot of sinus pressure and pain but has not been able to blow anything out. She has pressure behind both ears. No fevers or chills.    Reviewed and updated as needed this visit by Provider  Allergies  Meds  Problems    Review of Systems   ROS COMP: Constitutional, HEENT, cardiovascular, pulmonary, gi and gu systems are negative, except as otherwise noted.      Objective    /86   Pulse 95   Temp 98  F (36.7  C) (Temporal)   Resp 16   Ht 1.676 m (5' 6\")   Wt 109.3 kg (241 lb)   LMP 11/27/2019 (Approximate)   SpO2 98%   BMI 38.90 kg/m    Body mass index is 38.9 kg/m .  Physical Exam   GENERAL: healthy, alert and no distress  EYES: Eyes grossly normal to inspection, PERRL and conjunctivae and sclerae normal  HENT: ear canals with small amount of cerumen, removed with cerumen spoon. TM's normal. Nasal mucosa is erythematous and edematous bilaterally with whitish, green mucous. Pharynx is clear. Bilateral maxillary sinus tenderness.   NECK: no adenopathy, no asymmetry, masses, or scars and thyroid normal to palpation  RESP: lungs clear to auscultation - no rales, rhonchi or " wheezes  CV: regular rate and rhythm, normal S1 S2, no S3 or S4, no murmur, click or rub, no peripheral edema    Assessment & Plan       ICD-10-CM    1. Acute sinusitis with symptoms > 10 days J01.90 amoxicillin-clavulanate (AUGMENTIN) 875-125 MG tablet        Symptoms consistent with acute sinusitis.  Will prescribe an antibiotic called Augmentin to take twice daily for 10 days. Instructed to take a daily probiotic or Activia yogurt while on this.  Encouraged to drink plenty of fluids.  Can use an over the counter Nettipot or sinus rinse to help with nasal congestion. Mucinex can also be helpful.   I also recommend Flonase to help with nasal swelling.  Follow up if symptoms are not improving in 1 week.      Mitesh Duffy PA-C  [facility]

## 2019-12-11 NOTE — NURSING NOTE
"Chief Complaint   Patient presents with     Physical       Initial BP (!) 146/96 (BP Location: Right arm, Patient Position: Sitting, Cuff Size: Adult Large)   Pulse 96   Ht 1.676 m (5' 6\")   Wt 109.3 kg (241 lb)   LMP 11/27/2019 (Approximate)   BMI 38.90 kg/m   Estimated body mass index is 38.9 kg/m  as calculated from the following:    Height as of this encounter: 1.676 m (5' 6\").    Weight as of this encounter: 109.3 kg (241 lb).  Medication Reconciliation: complete    Mary Street CMA    "

## 2019-12-12 ENCOUNTER — OFFICE VISIT (OUTPATIENT)
Dept: FAMILY MEDICINE | Facility: OTHER | Age: 44
End: 2019-12-12
Payer: COMMERCIAL

## 2019-12-12 VITALS
HEIGHT: 66 IN | BODY MASS INDEX: 38.73 KG/M2 | HEART RATE: 95 BPM | WEIGHT: 241 LBS | DIASTOLIC BLOOD PRESSURE: 86 MMHG | RESPIRATION RATE: 16 BRPM | TEMPERATURE: 98 F | SYSTOLIC BLOOD PRESSURE: 128 MMHG | OXYGEN SATURATION: 98 %

## 2019-12-12 DIAGNOSIS — J01.90 ACUTE SINUSITIS WITH SYMPTOMS > 10 DAYS: Primary | ICD-10-CM

## 2019-12-12 PROCEDURE — 99213 OFFICE O/P EST LOW 20 MIN: CPT | Performed by: PHYSICIAN ASSISTANT

## 2019-12-12 ASSESSMENT — MIFFLIN-ST. JEOR: SCORE: 1759.92

## 2019-12-12 ASSESSMENT — PATIENT HEALTH QUESTIONNAIRE - PHQ9: SUM OF ALL RESPONSES TO PHQ QUESTIONS 1-9: 24

## 2019-12-12 NOTE — PATIENT INSTRUCTIONS
Will prescribe an antibiotic called Augmentin to take twice daily for 10 days. Take a daily probiotic or Activia yogurt while you are on this.  Drink plenty of fluids.  Can use an over the counter Nettipot or sinus rinse to help with nasal congestion. Mucinex can also be helpful.   I also recommend Flonase to help with nasal swelling.  Follow up if symptoms are not improving in 1 week.

## 2019-12-18 LAB
COPATH REPORT: ABNORMAL
PAP: ABNORMAL

## 2019-12-23 ENCOUNTER — TELEPHONE (OUTPATIENT)
Dept: FAMILY MEDICINE | Facility: OTHER | Age: 44
End: 2019-12-23

## 2019-12-23 DIAGNOSIS — E66.01 MORBID OBESITY (H): Primary | ICD-10-CM

## 2019-12-23 DIAGNOSIS — E78.5 HYPERLIPIDEMIA LDL GOAL <130: ICD-10-CM

## 2019-12-23 NOTE — TELEPHONE ENCOUNTER
"Patient had a physical with JKD on 12/11/19 - she is wondering if JKD thinks she should have these labs done? If so, please place orders. She is \"not begging to have these done but Eliot says she is due for it so she is questioning if she really needs to have it done.\"  "

## 2019-12-23 NOTE — TELEPHONE ENCOUNTER
Reason for Call: Request for an order or referral:    Order or referral being requested:  Lab orders for BMP and Lipids    Date needed: as soon as possible    Has the patient been seen by the PCP for this problem? YES    Additional comments:  Patient states her my chart said she was due for these labs.  Please place order and let patient know.  Thank you    Phone number Patient can be reached at:  Cell number on file:    Telephone Information:   Mobile [phone]       Best Time:  any    Can we leave a detailed message on this number?  YES    Call taken on 12/23/2019 at 12:45 PM by Ana Whitlock

## 2019-12-26 ENCOUNTER — ANCILLARY PROCEDURE (OUTPATIENT)
Dept: MAMMOGRAPHY | Facility: OTHER | Age: 44
End: 2019-12-26
Payer: COMMERCIAL

## 2019-12-26 DIAGNOSIS — Z12.31 VISIT FOR SCREENING MAMMOGRAM: ICD-10-CM

## 2019-12-26 PROCEDURE — 77067 SCR MAMMO BI INCL CAD: CPT | Mod: TC

## 2019-12-26 PROCEDURE — 77063 BREAST TOMOSYNTHESIS BI: CPT | Mod: TC

## 2020-01-03 DIAGNOSIS — E78.5 HYPERLIPIDEMIA LDL GOAL <130: ICD-10-CM

## 2020-01-03 DIAGNOSIS — E66.01 MORBID OBESITY (H): ICD-10-CM

## 2020-01-03 LAB
ANION GAP SERPL CALCULATED.3IONS-SCNC: 6 MMOL/L (ref 3–14)
BUN SERPL-MCNC: 9 MG/DL (ref 7–30)
CALCIUM SERPL-MCNC: 8.6 MG/DL (ref 8.5–10.1)
CHLORIDE SERPL-SCNC: 110 MMOL/L (ref 94–109)
CHOLEST SERPL-MCNC: 211 MG/DL
CO2 SERPL-SCNC: 26 MMOL/L (ref 20–32)
CREAT SERPL-MCNC: 0.76 MG/DL (ref 0.52–1.04)
GFR SERPL CREATININE-BSD FRML MDRD: >90 ML/MIN/{1.73_M2}
GLUCOSE SERPL-MCNC: 100 MG/DL (ref 70–99)
HDLC SERPL-MCNC: 29 MG/DL
LDLC SERPL CALC-MCNC: 125 MG/DL
NONHDLC SERPL-MCNC: 182 MG/DL
POTASSIUM SERPL-SCNC: 4.2 MMOL/L (ref 3.4–5.3)
SODIUM SERPL-SCNC: 142 MMOL/L (ref 133–144)
TRIGL SERPL-MCNC: 287 MG/DL

## 2020-01-03 PROCEDURE — 80061 LIPID PANEL: CPT | Performed by: ADVANCED PRACTICE MIDWIFE

## 2020-01-03 PROCEDURE — 80048 BASIC METABOLIC PNL TOTAL CA: CPT | Performed by: ADVANCED PRACTICE MIDWIFE

## 2020-01-03 PROCEDURE — 36415 COLL VENOUS BLD VENIPUNCTURE: CPT | Performed by: ADVANCED PRACTICE MIDWIFE

## 2020-01-23 ENCOUNTER — PATIENT OUTREACH (OUTPATIENT)
Dept: PEDIATRICS | Facility: CLINIC | Age: 45
End: 2020-01-23

## 2020-01-23 ENCOUNTER — OFFICE VISIT (OUTPATIENT)
Dept: OBGYN | Facility: OTHER | Age: 45
End: 2020-01-23
Payer: COMMERCIAL

## 2020-01-23 VITALS
DIASTOLIC BLOOD PRESSURE: 84 MMHG | SYSTOLIC BLOOD PRESSURE: 122 MMHG | WEIGHT: 243.75 LBS | BODY MASS INDEX: 39.34 KG/M2

## 2020-01-23 DIAGNOSIS — R87.612 PAPANICOLAOU SMEAR OF CERVIX WITH LOW GRADE SQUAMOUS INTRAEPITHELIAL LESION (LGSIL): ICD-10-CM

## 2020-01-23 DIAGNOSIS — Z32.00 PREGNANCY EXAMINATION OR TEST, PREGNANCY UNCONFIRMED: ICD-10-CM

## 2020-01-23 DIAGNOSIS — R87.610 ASCUS OF CERVIX WITH NEGATIVE HIGH RISK HPV: Primary | ICD-10-CM

## 2020-01-23 DIAGNOSIS — N89.8 VAGINAL DISCHARGE: ICD-10-CM

## 2020-01-23 LAB
HCG UR QL: NEGATIVE
SPECIMEN SOURCE: NORMAL
WET PREP SPEC: NORMAL

## 2020-01-23 PROCEDURE — 87210 SMEAR WET MOUNT SALINE/INK: CPT | Performed by: OBSTETRICS & GYNECOLOGY

## 2020-01-23 PROCEDURE — 81025 URINE PREGNANCY TEST: CPT | Performed by: OBSTETRICS & GYNECOLOGY

## 2020-01-23 PROCEDURE — 99212 OFFICE O/P EST SF 10 MIN: CPT | Mod: 25 | Performed by: OBSTETRICS & GYNECOLOGY

## 2020-01-23 PROCEDURE — 57452 EXAM OF CERVIX W/SCOPE: CPT | Performed by: OBSTETRICS & GYNECOLOGY

## 2020-01-23 NOTE — NURSING NOTE
"Chief Complaint   Patient presents with     Colposcopy       Initial /84 (BP Location: Left arm, Cuff Size: Adult Regular)   Wt 110.6 kg (243 lb 12 oz)   LMP 2019   BMI 39.34 kg/m   Estimated body mass index is 39.34 kg/m  as calculated from the following:    Height as of 19: 1.676 m (5' 6\").    Weight as of this encounter: 110.6 kg (243 lb 12 oz).  BP completed using cuff size: regular        The following HM Due: NONE      The following patient reported/Care Every where data was sent to:  P ABSTRACT QUALITY INITIATIVES [[id number]]       Tisha Salguero MA on 2020 at 9:11 AM           "

## 2020-01-23 NOTE — PROGRESS NOTES
44 year old  presents for colposcopy.      Indication for procedure: ASCUS pap, neg HPV.   Prior history of cervical dysplasia:   11/3/15 pap LSIL/+ HR HPV. Plan: colposcopy.   11/19/15 colposcopy. ASCUS.  Plan Pap and cotest in 16 LSIL pap/+ HR HPV (not 16 or 18). Plan: colposcopy bef 17.  16 [place] - LSIL.  Plan: Pap and HPV in 12 months.   17 LSIL pap, + HR HPV (not 16 or 18)  17  [place] - Bx negative (cervicitis).  ECC negative.  Plan Pap and Cotest in 18 LSIL pap, neg HPV. Plan: colp  19 [place]  Bx neg (cervicitis).  ECC neg.  Plan: cotest due 20 ASCUS pap, neg HPV.       Prior LEEP:No  Patient's last menstrual period was 2019.    Tobacco: No  Gardasil vaccination status:No  Pregnancy test: Negative  She denies the possibility of pregnancy.  She is not sexually active.      Discussed nature of HPV related infection, natural history and association with cervical dysplasia.  Procedure for colposcopy and biopsy was explained to the patient and consent obtained.  All the patient's questions were answered.    PROCEDURE:  COLPOSCOPY  After a procedural timeout was taken, she was positioned in dorsal lithotomy and a speculum was inserted to allow visualization of the cervix. A 5% acetic acid solution was applied to the ectocervix with large swabs. Lugols solution was also applied.  Colposcopic examination was then undertaken of the cervix, distal vaginal canal and vaginal fornices.    FINDINGS:  Physical Exam    Procedures    Cervix: no visible lesions, no mosaicism, no punctation and no abnormal vasculature; SCJ visualized - lesion at 11-12 o'clock, similar to last year, which was biopsied and normal.  No other lesions.  Small amount of abnormal discharge.  Wet prep collected.  No biopsies.      Vaginal inspection: vaginal colposcopy not performed.    Vulvar colposcopy: vulvar colposcopy not performed.    Procedure Summary: Patient tolerated  procedure well and colposcopy adequate.    Colposcopic Impression: Normal.      10 minutes were spent in face to face discussion regarding her abnormal pap, separate from the procedure.     Plan: Cotest in Dec 2020    She is also in a new relationship, so would like to return for IUD placement.  ParaGard, Karen, and Lily handouts given.       Tisha Cameron MD

## 2020-02-12 ENCOUNTER — OFFICE VISIT (OUTPATIENT)
Dept: FAMILY MEDICINE | Facility: CLINIC | Age: 45
End: 2020-02-12
Payer: COMMERCIAL

## 2020-02-12 VITALS
HEART RATE: 86 BPM | WEIGHT: 243.5 LBS | BODY MASS INDEX: 39.13 KG/M2 | HEIGHT: 66 IN | SYSTOLIC BLOOD PRESSURE: 116 MMHG | TEMPERATURE: 98 F | DIASTOLIC BLOOD PRESSURE: 70 MMHG | OXYGEN SATURATION: 97 % | RESPIRATION RATE: 18 BRPM

## 2020-02-12 DIAGNOSIS — R07.0 THROAT PAIN: ICD-10-CM

## 2020-02-12 DIAGNOSIS — J06.9 VIRAL URI: Primary | ICD-10-CM

## 2020-02-12 LAB
DEPRECATED S PYO AG THROAT QL EIA: NORMAL
SPECIMEN SOURCE: NORMAL

## 2020-02-12 PROCEDURE — 87880 STREP A ASSAY W/OPTIC: CPT | Performed by: PHYSICIAN ASSISTANT

## 2020-02-12 PROCEDURE — 87081 CULTURE SCREEN ONLY: CPT | Performed by: PHYSICIAN ASSISTANT

## 2020-02-12 PROCEDURE — 99213 OFFICE O/P EST LOW 20 MIN: CPT | Performed by: PHYSICIAN ASSISTANT

## 2020-02-12 ASSESSMENT — MIFFLIN-ST. JEOR: SCORE: 1771.26

## 2020-02-12 NOTE — PATIENT INSTRUCTIONS
Viral respiratory infections are caused by viruses.   They do not improve with antibiotic treatment.   Symptoms tend to be most significant in the first 4-5 days, but may continue for 7-10 days.    Should be re-evaluated for new fevers, shortness of breath or difficulty breathing, painful breathing, chest pain, inability to swallow, other new or worsening symptoms.     Symptomatic treatments include:  Rest!   Stay well hydrated (water, broth soups, 7-up, gatorade, tea, etc). You should be able to empty your bladder every 4-6 hours.   Using a humidifer in the bedroom  Nasal saline sprays (ie little noses brand- OTC at pharmacy) for nasal congestion.  Vitamin C  Stay home, limit contact with others while you are ill, do not share food or beverages, and always use good handwashing.     For symptoms can try the following:   For body aches, headache, pain:   Tylenol (acetaminophen) up to 1000mg 3x/day.   Ibuprofen 600-800mg 3x/day (max dosing for adults is 12 over the counter tablets per 24 hrs).     For nasal congestion:   Saline nasal spray or flush   Vicks Vapor Rub   Nasal spray containing oxymetazoline (Afrin) - 1 sprays each nostril twice a day for 3 days only (prolonged use can worsen congestion symptoms once discontinued)    For cough:   Vicks Vapor Rub  Mucinex tablets (guaifenesin)- loosens chest mucus : 600mg extended release twice daily  Dextromethorphan containing cough syrup such as Delsym or Robitussin DM:  1 tsp twice daily  Honey may soothe your sore throat and help manage your cough- may take straight or in warm water with lemon juice.    For sore throat:   Gargle with salt water   Drink fluids. Things with honey and lemon can be soothing.   Throat lozenges as needed.   Over-the-counter chloraseptic spray    Return to Clinic  if symptoms not gradually improving over the next week or if worsening.

## 2020-02-13 LAB
BACTERIA SPEC CULT: NORMAL
SPECIMEN SOURCE: NORMAL

## 2020-02-19 NOTE — TELEPHONE ENCOUNTER
"11/3/15 pap LSIL/+ HR HPV. Plan: colposcopy.   11/19/15 colposcopy. ASCUS.  Plan Pap and cotest in 11/2016 11/4/16 LSIL pap/+ HR HPV (not 16 or 18). Plan: colposcopy bef 2/4/17.  11/15/16 Pt. Advised.  12/8/16 [place] - LSIL.  Plan: \"Pap and HPV in 12 months.  If LSIL persists or worsens we will plan LEEP in 2018 or 2019.\" due by 12/8/17 11/7/17 LSIL pap, + HR HPV (not 16 or 18)  11/20/17 Pt. Advised.  11/30/17  [place] - Bx negative (cervicitis).  ECC negative.  Plan Pap and Cotest in Nov 2018, then LEEP if still abnormal (rlm)  11/13/18 LSIL pap, neg HPV. Plan: colp  11/27/18 Pt notified and will call back to schedule colp. (MRA)  1/7/19 [place]  Bx neg (cervicitis).  ECC neg.  Plan: cotest due 1/7/20 2/4/19 Pt notified by phone.  12/11/19 ASCUS pap, neg HPV. Plan: colp  1/23/2020:  [place].  No biopsies.  Plan: Cotest due 12/11/20 - Pt notified by provider at visit.     "

## 2020-02-27 ENCOUNTER — OFFICE VISIT (OUTPATIENT)
Dept: OBGYN | Facility: OTHER | Age: 45
End: 2020-02-27
Payer: COMMERCIAL

## 2020-02-27 VITALS
SYSTOLIC BLOOD PRESSURE: 134 MMHG | HEART RATE: 84 BPM | WEIGHT: 243.5 LBS | DIASTOLIC BLOOD PRESSURE: 84 MMHG | BODY MASS INDEX: 39.3 KG/M2

## 2020-02-27 DIAGNOSIS — Z32.00 PREGNANCY EXAMINATION OR TEST, PREGNANCY UNCONFIRMED: ICD-10-CM

## 2020-02-27 DIAGNOSIS — Z30.430 ENCOUNTER FOR IUD INSERTION: Primary | ICD-10-CM

## 2020-02-27 PROBLEM — Z97.5 IUD (INTRAUTERINE DEVICE) IN PLACE: Status: ACTIVE | Noted: 2020-02-27

## 2020-02-27 LAB — HCG UR QL: NEGATIVE

## 2020-02-27 PROCEDURE — 58300 INSERT INTRAUTERINE DEVICE: CPT | Performed by: OBSTETRICS & GYNECOLOGY

## 2020-02-27 PROCEDURE — 81025 URINE PREGNANCY TEST: CPT | Performed by: OBSTETRICS & GYNECOLOGY

## 2020-02-27 NOTE — NURSING NOTE
"Chief Complaint   Patient presents with     IUD     Insertion       Initial /84 (BP Location: Left arm, Cuff Size: Adult Regular)   Pulse 84   Wt 110.5 kg (243 lb 8 oz)   LMP 2020   BMI 39.30 kg/m   Estimated body mass index is 39.3 kg/m  as calculated from the following:    Height as of 20: 1.676 m (5' 6\").    Weight as of this encounter: 110.5 kg (243 lb 8 oz).  BP completed using cuff size: regular        The following HM Due: NONE      The following patient reported/Care Every where data was sent to:  P ABSTRACT QUALITY INITIATIVES [[id number]]        Tisha Salguero MA on 2020 at 9:06 AM          "

## 2020-02-27 NOTE — PROGRESS NOTES
Andree Kennedy is a 44 year old  Women who presents today requesting placement of an Mirena iud.   She denies the possibility of pregnancy.   Pregnancy test today is negative.     We discussed risks, benefits, and alternatives including but not limited to:     Possibility of pregnancy and ectopic pregnancy.    Possibility of pelvic inflammatory disease, particularly in the first 20 days and with new partners.    Risk of uterine perforation or IUD expulsi on.    Possibility of difficult removal.    Spotting or heavy bleeding.    Cramping, pain or infection during or after insertion.    The patient was given patient information on the IUD and the patient education brochure from the .  She understands the main indication for removal is abnormal bleeding.  The patient has given consent to proceed with placement of the IUD.  She wishes to proceed.  All questions answered.    PROCEDURE:    Type of IUD: Mirena   She is placed in a dorsal lithotomy potion and a pelvic exam is performed to determine the position of the uterus.  The cervix is identified and cleaned with betadine.  An allis was  applied to the anterior lip of the cervix for stabilization.   The uterus sounded to 9.0 cm. (Target sound depth is 6.5 cm to 8.5 cm.)  The IUD is inserted into the uterus under sterile conditions in the usual fashion.    Lot number [id number] and expiration date Apr 2022.  NDC#  28881-248-72.  The IUD string is then cut to 4.0 cm.    The patient tolerated this procedure without immediate complication.  The patient is to return or call immediately for any unexplained fever, abdominal or pelvic pain, excessive bleeding, possibility of pregnancy, foul-smelling discharge, sense that the IUD has been expelled.  All questions were answered.  Patient is aware that the IUD will be effective for 5 years and then will need removal or replacement.  Barrier methods for the first week advised.    Return to clinic in 3 months for IUD  check    Tisha Cameron MD

## 2020-12-11 ENCOUNTER — PATIENT OUTREACH (OUTPATIENT)
Dept: FAMILY MEDICINE | Facility: OTHER | Age: 45
End: 2020-12-11

## 2020-12-11 DIAGNOSIS — R87.612 PAPANICOLAOU SMEAR OF CERVIX WITH LOW GRADE SQUAMOUS INTRAEPITHELIAL LESION (LGSIL): ICD-10-CM

## 2020-12-11 NOTE — TELEPHONE ENCOUNTER
11/3/15 pap LSIL/+ HR HPV. Plan: colposcopy.   11/19/15 colposcopy. ASCUS.  Plan Pap and cotest in 11/2016 11/4/16 LSIL pap/+ HR HPV (not 16 or 18). Plan: colposcopy bef 2/4/17.  12/8/16 [place] - LSIL.  Plan: Pap and HPV in 12 months.   11/7/17 LSIL pap, + HR HPV (not 16 or 18)  11/30/17  [place] - Bx negative (cervicitis).  ECC negative.  Plan Pap and Cotest in Nov 2018 11/13/18 LSIL pap, neg HPV. Plan: colp  1/7/19 [place]  Bx neg (cervicitis).  ECC neg.  Plan: cotest due 1/7/20 12/11/19 ASCUS pap, neg HPV. Plan: colp   1/23/2020:  [place].  No biopsies.  Plan: Cotest due 12/11/20 12/11/20 Reminder Eliot

## 2020-12-15 ENCOUNTER — OFFICE VISIT (OUTPATIENT)
Dept: OBGYN | Facility: OTHER | Age: 45
End: 2020-12-15
Payer: COMMERCIAL

## 2020-12-15 VITALS
HEIGHT: 66 IN | TEMPERATURE: 98.3 F | WEIGHT: 255.5 LBS | SYSTOLIC BLOOD PRESSURE: 142 MMHG | DIASTOLIC BLOOD PRESSURE: 96 MMHG | BODY MASS INDEX: 41.06 KG/M2 | HEART RATE: 88 BPM

## 2020-12-15 DIAGNOSIS — R87.612 PAPANICOLAOU SMEAR OF CERVIX WITH LOW GRADE SQUAMOUS INTRAEPITHELIAL LESION (LGSIL): Primary | ICD-10-CM

## 2020-12-15 DIAGNOSIS — Z01.419 ENCOUNTER FOR WELL WOMAN EXAM: ICD-10-CM

## 2020-12-15 PROCEDURE — 99396 PREV VISIT EST AGE 40-64: CPT | Performed by: ADVANCED PRACTICE MIDWIFE

## 2020-12-15 PROCEDURE — 87624 HPV HI-RISK TYP POOLED RSLT: CPT | Performed by: ADVANCED PRACTICE MIDWIFE

## 2020-12-15 PROCEDURE — 88141 CYTOPATH C/V INTERPRET: CPT | Performed by: PATHOLOGY

## 2020-12-15 RX ORDER — TRAZODONE HYDROCHLORIDE 50 MG/1
50 TABLET, FILM COATED ORAL AT BEDTIME
COMMUNITY
End: 2022-11-16

## 2020-12-15 ASSESSMENT — MIFFLIN-ST. JEOR: SCORE: 1824.66

## 2020-12-15 NOTE — PROGRESS NOTES
SUBJECTIVE:   CC: Andree Kennedy is an 45 year old woman who presents for preventive health visit.       Patient has been advised of split billing requirements and indicates understanding: Yes  HPI        Check iud placement    Today's PHQ-2 Score:   PHQ-2 ( 1999 [employer]) 12/15/2020   Q1: Little interest or pleasure in doing things -   Q2: Feeling down, depressed or hopeless -   PHQ-2 Score -   Q1: Little interest or pleasure in doing things -   Q2: Feeling down, depressed or hopeless -   PHQ-2 Score Incomplete       Abuse: Current or Past (Physical, Sexual or Emotional) - No  Do you feel safe in your environment? Yes        Social History     Tobacco Use     Smoking status: Never Smoker     Smokeless tobacco: Never Used   Substance Use Topics     Alcohol use: Not Currently     Comment: rarely - twice per year      If you drink alcohol do you typically have >3 drinks per day or >7 drinks per week? No    Alcohol Use 12/11/2019   Prescreen: >3 drinks/day or >7 drinks/week? No   Prescreen: >3 drinks/day or >7 drinks/week? -   No flowsheet data found.    Reviewed orders with patient.  Reviewed health maintenance and updated orders accordingly - Yes  Lab work is in process    Mammogram Screening: Patient under age 50, mutual decision reflected in health maintenance.      Pertinent mammograms are reviewed under the imaging tab.  History of abnormal Pap smear:   PAP / HPV Latest Ref Rng & Units 12/11/2019 11/13/2018 11/7/2017   PAP - ASC-US(A) LSIL(A) LSIL(A)   PAP DATE - [facility] - - - -   HPV 16 DNA NEG:Negative Negative Negative Negative   HPV 18 DNA NEG:Negative Negative Negative Negative   OTHER HR HPV NEG:Negative Negative Negative Positive(A)     Reviewed and updated as needed this visit by clinical staff                 Reviewed and updated as needed this visit by Provider                    Review of Systems  CONSTITUTIONAL: NEGATIVE for fever, chills, change in weight  INTEGUMENTARU/SKIN: NEGATIVE for worrisome  "rashes, moles or lesions  EYES: NEGATIVE for vision changes or irritation  ENT: NEGATIVE for ear, mouth and throat problems  RESP: NEGATIVE for significant cough or SOB  BREAST: NEGATIVE for masses, tenderness or discharge  CV: NEGATIVE for chest pain, palpitations or peripheral edema  GI: NEGATIVE for nausea, abdominal pain, heartburn, or change in bowel habits  : NEGATIVE for unusual urinary or vaginal symptoms.No periods since shortly after the IUD was inserted.  MUSCULOSKELETAL: NEGATIVE for significant arthralgias or myalgia  NEURO: NEGATIVE for weakness, dizziness or paresthesias  ENDOCRINE: NEGATIVE for temperature intolerance, skin/hair changes  PSYCHIATRIC: NEGATIVE for changes in mood or affect     OBJECTIVE:   BP (!) 142/96 (BP Location: Right arm, Patient Position: Sitting, Cuff Size: Adult Large)   Pulse 88   Temp 98.3  F (36.8  C) (Tympanic)   Ht 1.683 m (5' 6.25\")   Wt 115.9 kg (255 lb 8 oz)   BMI 40.93 kg/m    Physical Exam  GENERAL: healthy, alert and no distress  EYES: Eyes grossly normal to inspection, PERRL and conjunctivae and sclerae normal  HENT: ear canals and TM's normal, nose and mouth without ulcers or lesions. Left ear cannal is occluded by wax  NECK: no adenopathy, no asymmetry, masses, or scars and thyroid normal to palpation  RESP: lungs clear to auscultation - no rales, rhonchi or wheezes  BREAST: normal without masses, tenderness or nipple discharge and no palpable axillary masses or adenopathy  CV: regular rate and rhythm, normal S1 S2, no S3 or S4, no murmur, click or rub, no peripheral edema and peripheral pulses strong  ABDOMEN: soft, nontender, no hepatosplenomegaly, no masses and bowel sounds normal   (female): normal female external genitalia, normal urethral meatus, vaginal mucosa pink, moist, well rugated, and normal cervix/adnexa/uterus without masses or discharge.  IUD strings noted a os.   MS: no gross musculoskeletal defects noted, no edema  SKIN: no suspicious " "lesions or rashes  NEURO: Normal strength and tone, mentation intact and speech normal  PSYCH: mentation appears normal, affect normal/bright    Diagnostic Test Results:  Labs reviewed in Epic  No results found for this or any previous visit (from the past 24 hour(s)).    ASSESSMENT/PLAN:   (R87.612) Papanicolaou smear of cervix with low grade squamous intraepithelial lesion (LGSIL)  (primary encounter diagnosis)  Comment:   Plan: Pap imaged thin layer diagnostic with HPV         (select HPV order below), HPV High Risk Types         DNA Cervical            (Z01.419) Encounter for well woman exam  Comment:   Plan:       COUNSELING:  Reviewed preventive health counseling, as reflected in patient instructions       Contraception    Admits that COVID has been problematic for her.  Continues therapy and has been doing self cares and is managing better now that early on  Estimated body mass index is 39.3 kg/m  as calculated from the following:    Height as of 2/12/20: 1.676 m (5' 6\").    Weight as of 2/27/20: 110.5 kg (243 lb 8 oz).    Will follow up with PCP related to her BP and ear wax    She reports that she has never smoked. She has never used smokeless tobacco.      Counseling Resources:  ATP IV Guidelines  Pooled Cohorts Equation Calculator  Breast Cancer Risk Calculator  BRCA-Related Cancer Risk Assessment: FHS-7 Tool  FRAX Risk Assessment  ICSI Preventive Guidelines  Dietary Guidelines for Americans, 2010  USDA's MyPlate  ASA Prophylaxis  Lung CA Screening    Day FRAN Ochoa [facility]  "

## 2020-12-21 LAB
COPATH REPORT: NORMAL
PAP: NORMAL

## 2021-01-07 ENCOUNTER — VIRTUAL VISIT (OUTPATIENT)
Dept: FAMILY MEDICINE | Facility: OTHER | Age: 46
End: 2021-01-07
Payer: COMMERCIAL

## 2021-01-07 DIAGNOSIS — E66.01 MORBID OBESITY (H): Primary | ICD-10-CM

## 2021-01-07 DIAGNOSIS — I10 HTN, GOAL BELOW 140/90: ICD-10-CM

## 2021-01-07 DIAGNOSIS — E78.5 HYPERLIPIDEMIA LDL GOAL <130: ICD-10-CM

## 2021-01-07 PROCEDURE — 99441 PR PHYSICIAN TELEPHONE EVALUATION 5-10 MIN: CPT | Mod: 95 | Performed by: PHYSICIAN ASSISTANT

## 2021-01-07 ASSESSMENT — PATIENT HEALTH QUESTIONNAIRE - PHQ9
5. POOR APPETITE OR OVEREATING: NEARLY EVERY DAY
SUM OF ALL RESPONSES TO PHQ QUESTIONS 1-9: 20

## 2021-01-07 ASSESSMENT — ANXIETY QUESTIONNAIRES
7. FEELING AFRAID AS IF SOMETHING AWFUL MIGHT HAPPEN: NEARLY EVERY DAY
1. FEELING NERVOUS, ANXIOUS, OR ON EDGE: NEARLY EVERY DAY
6. BECOMING EASILY ANNOYED OR IRRITABLE: NEARLY EVERY DAY
GAD7 TOTAL SCORE: 18
5. BEING SO RESTLESS THAT IT IS HARD TO SIT STILL: MORE THAN HALF THE DAYS
IF YOU CHECKED OFF ANY PROBLEMS ON THIS QUESTIONNAIRE, HOW DIFFICULT HAVE THESE PROBLEMS MADE IT FOR YOU TO DO YOUR WORK, TAKE CARE OF THINGS AT HOME, OR GET ALONG WITH OTHER PEOPLE: EXTREMELY DIFFICULT
2. NOT BEING ABLE TO STOP OR CONTROL WORRYING: MORE THAN HALF THE DAYS
3. WORRYING TOO MUCH ABOUT DIFFERENT THINGS: MORE THAN HALF THE DAYS

## 2021-01-07 NOTE — LETTER
[facility]  [address]  [place] [address]  Phone: [phone]    January 7, 2021        Andree Kennedy  [address]  [place] [address]          To whom it may concern:    RE: Andree Kennedy is a patient at our clinic listed above and is under my care. Patient has been under my care since 2018. She carries the diagnosis of morbid obesity. During the past few years, we have worked extensively with various medications and lifestyle changes to decrease her weight with minimal success. I am writing to ask for a benefit/insurance exemption to cover [facility] weight loss program. This program should be covered and would provide significant improvement to her health and weight.       Please contact me for questions or concerns.      Sincerely,        Letty Oneill PA-C  January 7, 2021

## 2021-01-07 NOTE — PATIENT INSTRUCTIONS
- Naltrexone, Victoza injections, Phentermine, Wellbutrin, Topamax      These are some common medications for weight loss     Comment: Your provider has referred you to: PREFERRED PROVIDERS:   [facility]: [facility] Edilia Skelton [phone] [email or web address]      Please be aware that coverage of these services is subject to the terms and limitations of your health insurance plan.  Call member services at your health plan with any benefit or coverage questions.        Please bring the following with you to your appointment:

## 2021-01-07 NOTE — PROGRESS NOTES
"Andree Kennedy is a 45 year old female who is being evaluated via a billable telephone visit.      What phone number would you like to be contacted at? [phone]  How would you like to obtain your AVS? MyChart     Assessment & Plan     ICD-10-CM    1. Morbid obesity (H)  E66.01 COMPREHENSIVE WEIGHT MANAGEMENT   2. HTN, goal below 140/90  I10 Comprehensive metabolic panel (BMP + Alb, Alk Phos, ALT, AST, Total. Bili, TP)   3. Hyperlipidemia LDL goal <130  E78.5 Comprehensive metabolic panel (BMP + Alb, Alk Phos, ALT, AST, Total. Bili, TP)     Lipid panel reflex to direct LDL Fasting     1. Weight   - Patient looking for letter to try weight loss program as listed below, doesn't feel quite ready for bariatric surgery     Did write letter for her   - Discussed our bariatric center can also do medication management, she is interested in this, referral placed     Patient has previously done phentermine with very limited success    Discussed some of the other medications they might try      2 & 3. HTN and lipids   - BPs have been elevated in the past as has lipids, reviewed in Epic labs from 1/3/2020 BMP and Lipid panel   - Patient is not on medication  - Due for yearly labs of BMP and lipid panel   - Patient will schedule fasting labs       BMI:   Estimated body mass index is 40.93 kg/m  as calculated from the following:    Height as of 12/15/20: 1.683 m (5' 6.25\").    Weight as of 12/15/20: 115.9 kg (255 lb 8 oz).   Weight management plan: Patient referred to endocrine and/or weight management specialty    Review of the result(s) of each unique test - BMP and lipid from 1/3/20   Diagnosis or treatment significantly limited by social determinants of health - Obesity, mental health - follows with [facility]     20 minutes spent on the date of the encounter doing chart review, history and exam, documentation and further activities as noted above    Phone call duration: 7 minutes and 30 seconds     The patient indicates " "understanding of these issues and agrees with the plan.    Return in about 1 year (around 1/7/2022). and with specialty     Letty Oneill PA-C  [facility]     Andree Kennedy is a 45 year old who presents to clinic today for the following health issues     HPI   Patient is worried about her weight- she has talked about this with her PCP before. She has gain 25lbs since COVID-19 and is wanting to get a handle on it. She is starting this management program [facility], but her insurance needs a letter from her provider and then the will cover it. She is going to do this regardless but she wants this letter so she can get it covered. Is okay with getting labs done if that is what PCP needs. But she feels like and \"healthy\" outside her weight. She states that she doesn't not want to do the sleeve yet, doesn't think having surgery is smart if she can do it another way.   - [facility]   - Doing a lot of research       Review of Systems   Constitutional, HEENT, cardiovascular, pulmonary, gi and gu systems are negative, except as otherwise noted.      Objective       Vitals:  No vitals were obtained today due to virtual visit.    Physical Exam   healthy, alert and no distress  PSYCH: Alert and oriented times 3; coherent speech, normal   rate and volume, able to articulate logical thoughts, able   to abstract reason, no tangential thoughts, no hallucinations   or delusions  Her affect is normal  RESP: No cough, no audible wheezing, able to talk in full sentences  Remainder of exam unable to be completed due to telephone visits    Diagnostics  Reviewed in Epic         "

## 2021-01-08 ASSESSMENT — ANXIETY QUESTIONNAIRES: GAD7 TOTAL SCORE: 18

## 2021-01-12 DIAGNOSIS — I10 HTN, GOAL BELOW 140/90: ICD-10-CM

## 2021-01-12 DIAGNOSIS — E78.5 HYPERLIPIDEMIA LDL GOAL <130: ICD-10-CM

## 2021-01-12 LAB
ALBUMIN SERPL-MCNC: 3.4 G/DL (ref 3.4–5)
ALP SERPL-CCNC: 110 U/L (ref 40–150)
ALT SERPL W P-5'-P-CCNC: 67 U/L (ref 0–50)
ANION GAP SERPL CALCULATED.3IONS-SCNC: 4 MMOL/L (ref 3–14)
AST SERPL W P-5'-P-CCNC: 61 U/L (ref 0–45)
BILIRUB SERPL-MCNC: 0.6 MG/DL (ref 0.2–1.3)
BUN SERPL-MCNC: 15 MG/DL (ref 7–30)
CALCIUM SERPL-MCNC: 9 MG/DL (ref 8.5–10.1)
CHLORIDE SERPL-SCNC: 107 MMOL/L (ref 94–109)
CHOLEST SERPL-MCNC: 237 MG/DL
CO2 SERPL-SCNC: 29 MMOL/L (ref 20–32)
CREAT SERPL-MCNC: 0.66 MG/DL (ref 0.52–1.04)
GFR SERPL CREATININE-BSD FRML MDRD: >90 ML/MIN/{1.73_M2}
GLUCOSE SERPL-MCNC: 92 MG/DL (ref 70–99)
HDLC SERPL-MCNC: 31 MG/DL
LDLC SERPL CALC-MCNC: 181 MG/DL
NONHDLC SERPL-MCNC: 206 MG/DL
POTASSIUM SERPL-SCNC: 4.1 MMOL/L (ref 3.4–5.3)
PROT SERPL-MCNC: 7.9 G/DL (ref 6.8–8.8)
SODIUM SERPL-SCNC: 140 MMOL/L (ref 133–144)
TRIGL SERPL-MCNC: 123 MG/DL

## 2021-01-12 PROCEDURE — 80053 COMPREHEN METABOLIC PANEL: CPT | Performed by: PHYSICIAN ASSISTANT

## 2021-01-12 PROCEDURE — 36415 COLL VENOUS BLD VENIPUNCTURE: CPT | Performed by: PHYSICIAN ASSISTANT

## 2021-01-12 PROCEDURE — 80061 LIPID PANEL: CPT | Performed by: PHYSICIAN ASSISTANT

## 2021-01-12 NOTE — RESULT ENCOUNTER NOTE
Shari Candelario    Your results show a slight increase in your liver function. We should recheck that in a month. Your cholesterol is also dramatically increased. Both of these can be because of poor diet. Recommend working on a low-fat diet to bring these numbers down.     The results are attached for your review.       Bassem Oneill PA-C    The 10-year ASCVD risk score (Sitaedwin TILLMAN Jr., et al., 2013) is: 3.3%    Values used to calculate the score:      Age: 45 years      Sex: Female      Is Non- : No      Diabetic: No      Tobacco smoker: No      Systolic Blood Pressure: 142 mmHg      Is BP treated: No      HDL Cholesterol: 31 mg/dL      Total Cholesterol: 237 mg/dL

## 2021-01-13 ENCOUNTER — MYC MEDICAL ADVICE (OUTPATIENT)
Dept: OBGYN | Facility: OTHER | Age: 46
End: 2021-01-13

## 2021-01-13 NOTE — PROGRESS NOTES
"Fabiana is a 45 year old who is being evaluated via a billable video visit.      If the video visit is dropped, the invitation should be resent by: Text to cell phone: [phone]  Will anyone else be joining your video visit? No      Video-Visit Details    Type of service:  Video Visit    Video Start Time: 9:39 AM    Video End Time:10:37 AM      Originating Location (pt. Location): Home    Distant Location (provider location):  [facility] [place]     Platform used for Video Visit: [facility] Weight Management Consult    PATIENT:  Andree Kennedy  MRN:         [id number]  :         1975  LASHELL:         2021    Dear Letty Oneill PA-C,    I had the pleasure of seeing your patient, Andree Kennedy. Full intake/assessment was done to determine barriers to weight loss success and develop a treatment plan. Andree Kennedy is a 45 year old female interested in treatment of medical problems associated with excess weight. She has a height of 5' 7\", a weight of 245 lbs 0 oz, and the calculated Body mass index is 38.37 kg/m .    ASSESSMENT & PLAN:    Problem List Items Addressed This Visit     None           PROGRAM OVERVIEW  Reviewed options at [facility] including provider visits, dietician, 24 week program, health coaching, food supplements.  All of patients questions about the weight loss program were answered fully.   Pt is interested in the 24 week program and meal replacement.  Will have nurse call pt to review today.       MEDICATIONS:  We discussed healthy habits to assist with weight loss.  We discussed the role of pharmacological agents in the treatment of obesity and the \"off-label\" use of medications in this practice. We reviewed medication that may assist with weight loss.  Discussed that medications must always be used together with lifestyle changes such as improvements in diet choices, portion control and " establishing and maintaining a regular exercise program. No medications were prescribed. Will await results of labs and review medical management at follow up visit in 4-6 weeks.  Would avoid Phentermine/Wellbutrin.  Cravings not an issue so naltrexone not indicated.  Would start with Topiramate unless HgA1C shows insulin resistance and then would consider GLP-1 or Metformin.  Topiramate might reinforce goal of avoiding pop as well.     CURRENT GOALS:   No screens during meals  Continue to avoid Pop-this should facilitate the most weight loss    FUTURE GOALS:  Pt interested in activity goals once kids are back in school.     NUTRITION: referral ordered today     - NUTRITION REFERRAL  (Pt will decide if she wants to continue with meal replacement for 4-12 weeks after seeing dietician.     LABS:   Ordered   TSH  HgA1C  Vit D    COUNSELING:   We discussed Bariatric Basics including:  -eating 3 meals daily  -eating protein first  -eating slowly, chewing food well  -avoiding/limiting calorie containing beverages  -limiting carbohydrates and changing to whole grains  -limiting restaurant or cafeteria eating to twice a week or less    We discussed the importance of restorative sleep and stress management in maintaining a healthy weight.  We discussed insulin resistance and glycemic index as it relates to appetite and weight control.   We discussed the importance of physical activity including cardiovascular and strength training in maintaining a healthier weight and explored viable options.  We discussed the National Weight Control Registry healthy weight maintenance strategies and ways to optimize metabolism.  Patient education of above written in AVS.     Follow up: Return to clinic in 1 month to discuss lab results and options for medical weight management.    79 minutes spent on the date of the encounter doing chart review, patient visit and documentation        She has the following co-morbidities:       1/18/2021   I  "have the following health issues associated with obesity: High Blood Pressure, High Cholesterol, Stress Incontinence   I have the following symptoms associated with obesity: Depression, Back Pain, Fatigue, Groin Rash       Patient Goals 1/18/2021   I am interested in having a healthier weight to diminish current health problems: Yes   I am interested in having a healthier weight in order to prevent future health problems: Yes   I am interested in having a healthier weight in order to have a future surgery: Yes   If yes, please indicate which surgery? Possibly sleeve surgery       Referring Provider 1/18/2021   Please name the provider who referred you to Medical Weight Management.  If you do not know, please answer: \"I Don't Know\". Manisha Oneill       Weight History 1/18/2021   How concerned are you about your weight? Very Concerned   Would you describe your weight gain as gradual? Yes   I became overweight: In College   The following factors have contributed to my weight gain:  Mental Health Issues, Started on Medication that Caused Weight Gain, Genetic (Runs in the Family), Stress   I have tried the following methods to lose weight: Watching Portions or Calories, Atkins-type Diet (Low Carb/High Protein)   My lowest weight since age 18 was: 160   My highest weight since age 18 was: 253   The most weight I have ever lost was: (lbs) 45   I have the following family history of obesity/being overweight:  My mother is overweight, My father is overweight, One or more of my siblings are overweight, Many of my relatives are overweight   Has anyone in your family had weight loss surgery? No   How has your weight changed over the last year?  Gained   How many pounds? 30   Doing the Optiva (medifast) program.  Eats 6 times a day with this. 5 fuelings and 1 lean (5 oz meat) and green meal. Has been on this for a week.  Has lost 8 lbs in a week.  Not sure this is sustainable for her.     Was on phentermine about " 20 years ago and it worked well.  Lost 40 lbs.  Tried it a few times since but felt she had some heart palpitations on the 37.5 mg.  Not interested in this now with her current anxiety and HTN.     Pt feels her biggest problem is drinking her calories-Pop, juice.  Soda is her vice.  Drinks a 2 liter daily until last week.      Diet Recall Review with Patient 1/18/2021   If you do eat breakfast, what types of food do you eat? Wake up 7:15 am    Eats 8:00 am Cereal, bagel,   Do you typically eat lunch? Yes-noon   If you do eat lunch, what types of food do you typically eat?  Chicken or salad or burger   Do you typically eat supper? Yes 5-6 pm   If you do eat supper, what types of food do you typically eat? Pasta meat casserole   Do you typically eat snacks? Yes   If you do snack, what types of food do you typically eat? Crackers chips fruit (handful of chips/crackers)   Do you like vegetables?  Yes   Do you drink water? Yes   How many glasses of juice do you drink in a typical day? 0   How many of glasses of milk do you drink in a typical day? 0   If you do drink milk, what type? 2%   How many 8oz glasses of sugar containing drinks such as Marlon-Aid/sweet tea do you drink in a day? 0   How many cans/bottles of sugar pop/soda/tea/sports drinks do you drink in a day? 2 liter of coke daily.  (Cut this cold turkey last week- had withdrawal headache but now ok.) (SODA WAS HER DRUG)   How many cans/bottles of diet pop/soda/tea or sports drink do you drink in a day? 0   How often do you have a drink of alcohol? Monthly or Less   If you do drink, how many drinks might you have in a day? 3-4       Eating Habits 1/18/2021   Generally, my meals include foods like these: bread, pasta, rice, potatoes, corn, crackers, sweet dessert, pop, or juice. Almost Everyday   Generally, my meals include foods like these: fried meats, brats, burgers, french fries, pizza, cheese, chips, or ice cream. A Few Times a Week   Eat fast food (like  [employer], [employer], [employer]). Once a Week   Eat at a buffet or sit-down restaurant. Once a Week   Eat most of my meals in front of the TV or computer. Almost Everyday (lives in [facility])   Often skip meals, eat at random times, have no regular eating times. A Few Times a Week   Rarely sit down for a meal but snack or graze throughout.  Less Than Weekly   Eat extra snacks between meals. Less Than Weekly   Eat most of my food at the end of the day. Less Than Weekly   Eat in the middle of the night or wake up at night to eat. Never   Eat extra snacks to prevent or correct low blood sugar. Never   Eat to prevent acid reflux or stomach pain. Never   Worry about not having enough food to eat. Less Than Weekly   Have you been to the food shelf at least a few times this year? Yes   I eat when I am depressed. A Few Times a Week   I eat when I am stressed. A Few Times a Week   I eat when I am bored. A Few Times a Week   I eat when I am anxious. A Few Times a Week   I eat when I am happy or as a reward. A Few Times a Week   I feel hungry all the time even if I just have eaten. Less Than Weekly   Feeling full is important to me. A Few Times a Week   I finish all the food on my plate even if I am already full. Once a Week   I can't resist eating delicious food or walk past the good food/smell. Once a Week   I eat/snack without noticing that I am eating. Once a Week   I eat when I am preparing the meal. Less Than Weekly   I eat more than usual when I see others eating. Less Than Weekly   I have trouble not eating sweets, ice cream, cookies, or chips if they are around the house. Less Than Weekly   I think about food all day. Less Than Weekly   Please list any other foods you crave? Depends       Amount of Food 1/18/2021   I make myself vomit what I have eaten or use laxatives to get rid of food. Never   I eat a large amount of food, like a loaf of bread, a box of cookies, a pint/quart of ice cream, all at once. Never   I  eat a large amount of food even when I am not hungry. Never   I eat rapidly. Never   I eat alone because I feel embarrassed and do not want others to see how much I have eaten. Never   I eat until I am uncomfortably full. Monthly   I feel bad, disgusted, or guilty after I overeat. Monthly   I make myself vomit what I have eaten or use laxatives to get rid of food. Never       Activity/Exercise History 1/18/2021   How much of a typical 12 hour day do you spend sitting? Half the Day   How much of a typical 12 hour day do you spend lying down? Half the Day   How much of a typical day do you spend walking/standing? Less Than Half the Day   How many hours (not including work) do you spend on the TV/Video Games/Computer/Tablet/Phone? 4-5 Hours   How many times a week are you active for the purpose of exercise? Once a Week   What keeps you from being more active? Pain, Shortness of Breath, Too tired   How many total minutes do you spend doing some activity for the purpose of exercising when you exercise? 15-30 Minutes   Pt has 2 gym memberships [employer].  Hasn't been to them do to COVID.  Enjoys activity but not readt to make this a goal due to COVID exposure right now and time limits with distance learning.     PAST MEDICAL HISTORY:  Past Medical History:   Diagnosis Date     Anxiety      Bipolar 1 disorder (H)      Depressive disorder      Papanicolaou smear of cervix with low grade squamous intraepithelial lesion (LGSIL) 11/3/15, 11/4/16    + HR HPV       Work/Social History Reviewed With Patient 1/18/2021   My employment status is: Disabled, Stay at Home Parent   How much of your job is spent on the computer or phone? Less Than 50%   What is your marital status? Single   Do you have children? Yes   If you have children, are they overweight? Yes   Who do you live with?  My 12 year old and 8 year old   Are they supportive of your health goals? Yes   Who does the food shopping?  I do the food shopping        Mental Health History Reviewed With Patient 1/18/2021   Have you ever been physically or sexually abused? No   If yes, do you feel that the abuse is affecting your weight? N/A   If yes, would you like to talk to a counselor about the abuse? N/A   How often in the past 2 weeks have you felt little interest or pleasure in doing things? Nearly Everyday   Over the past 2 weeks how often have you felt down, depressed, or hopeless? Nearly Everyday   Goes to therapy weekly-Danelle.  Has a psychiatrist- Robert every 3 months.  Covid really effected her mental health.  She feels she is well supported in her mental health right now.      Sleep History Reviewed With Patient 1/18/2021   How many hours do you sleep at night? 7  10-11 pm bed Wakes up at 7:15 am    Do you think that you snore loudly or has anybody ever heard you snore loudly (louder than talking or so loud it can be heard behind a shut door)? No   Has anyone seen or heard you stop breathing during your sleep? No   Do you often feel tired, fatigued, or sleepy during the day? Yes   Do you have a TV/Computer in your bedroom? Yes (doesn;t use it much)       MEDICATIONS:   Current Outpatient Medications   Medication Sig Dispense Refill     ALPRAZolam (XANAX PO) Take by mouth every 4 hours as needed for anxiety       FLUoxetine (PROZAC) 20 MG capsule TAKE 1 CAPSULE BY MOUTH ONCE DAILY  1     traZODone (DESYREL) 50 MG tablet Take 50 mg by mouth At Bedtime       VRAYLAR 3 MG CAPS capsule TAKE 1 CAPSULE BY MOUTH ONCE DAILY  1     Lurasidone HCl (LATUDA PO) Take 40 mg by mouth daily      ROS:    General  Fatigue:   Sleep Quality:  HEENT  Hx of glaucoma: none  Vision changes: none  Cardiovascular  Chest Pain with Exertion: none  Palpitations: none (with anxiety)  Hx of heart disease: none  HTN:   BP Readings from Last 6 Encounters:   12/15/20 (!) 142/96   02/27/20 134/84   02/12/20 116/70   01/23/20 122/84   12/12/19 128/86   12/11/19 (!) 146/96     Pulmonary  Shortness of  breath at rest: none  Shortness of breath with exertion: none  Snoring: none  Gastrointestinal  Heartburn: none  Abdominal pain: none  Constipation:none  Gastroparesis:  Liver Dz:  Hx of Small Bowel Obstruction: none  H/O Pancreatitis: none  H/O Gallbladder Dz: none  Psychiatric  Moods Stable: depressed  Anxiety: present  Depression: preset   History of alcohol/drug abuse: none  Hx of eating disorder: none  Endocrine  Polydipsia:   Thyroid disease:none  Thyroid Cancer: none  Neurologic:  Hx of seizures: none  Hx of paresthesias:none  Headaches: none  Memory Impairment: none    History of kidney stones: none  History of kidney disease: none  Current birth control: Mirena        ALLERGIES:   No Known Allergies      Labs/Records Reviewed:  Hemoglobin A1C   Date Value Ref Range Status   03/01/2016 5.5 4.3 - 6.0 % Final     TSH   Date Value Ref Range Status   11/06/2017 1.57 0.40 - 4.00 mU/L Final     Sodium   Date Value Ref Range Status   01/12/2021 140 133 - 144 mmol/L Final     Potassium   Date Value Ref Range Status   01/12/2021 4.1 3.4 - 5.3 mmol/L Final     Chloride   Date Value Ref Range Status   01/12/2021 107 94 - 109 mmol/L Final     Carbon Dioxide   Date Value Ref Range Status   01/12/2021 29 20 - 32 mmol/L Final     Anion Gap   Date Value Ref Range Status   01/12/2021 4 3 - 14 mmol/L Final     Glucose   Date Value Ref Range Status   01/12/2021 92 70 - 99 mg/dL Final     Urea Nitrogen   Date Value Ref Range Status   01/12/2021 15 7 - 30 mg/dL Final     Creatinine   Date Value Ref Range Status   01/12/2021 0.66 0.52 - 1.04 mg/dL Final     GFR Estimate   Date Value Ref Range Status   01/12/2021 >90 >60 mL/min/[1.73_m2] Final     Comment:     Non  GFR Calc  Starting 12/18/2018, serum creatinine based estimated GFR (eGFR) will be   calculated using the Chronic Kidney Disease Epidemiology Collaboration   (CKD-EPI) equation.       Calcium   Date Value Ref Range Status   01/12/2021 9.0 8.5 - 10.1  "mg/dL Final     Bilirubin Total   Date Value Ref Range Status   01/12/2021 0.6 0.2 - 1.3 mg/dL Final     Alkaline Phosphatase   Date Value Ref Range Status   01/12/2021 110 40 - 150 U/L Final     ALT   Date Value Ref Range Status   01/12/2021 67 (H) 0 - 50 U/L Final     AST   Date Value Ref Range Status   01/12/2021 61 (H) 0 - 45 U/L Final     Cholesterol   Date Value Ref Range Status   01/12/2021 237 (H) <200 mg/dL Final     Comment:     Desirable:       <200 mg/dl     HDL Cholesterol   Date Value Ref Range Status   01/12/2021 31 (L) >49 mg/dL Final     LDL Cholesterol Calculated   Date Value Ref Range Status   01/12/2021 181 (H) <100 mg/dL Final     Comment:     Above desirable:  100-129 mg/dl  Borderline High:  130-159 mg/dL  High:             160-189 mg/dL  Very high:       >189 mg/dl       Triglycerides   Date Value Ref Range Status   01/12/2021 123 <150 mg/dL Final     WBC   Date Value Ref Range Status   05/26/2016 10.7 4.0 - 11.0 10e9/L Final     Hemoglobin   Date Value Ref Range Status   05/26/2016 12.2 11.7 - 15.7 g/dL Final     Hematocrit   Date Value Ref Range Status   05/26/2016 35.6 35.0 - 47.0 % Final     MCV   Date Value Ref Range Status   05/26/2016 91 78 - 100 fl Final     Platelet Count   Date Value Ref Range Status   05/26/2016 188 150 - 450 10e9/L Final         PHYSICAL EXAM:  Ht 5' 7\" (1.702 m)   Wt 245 lb (111.1 kg)   BMI 38.37 kg/m    GENERAL: Healthy, alert and no distress  EYES: Eyes grossly normal to inspection.  No discharge or erythema, or obvious scleral/conjunctival abnormalities.  RESP: No audible wheeze, cough, or visible cyanosis.  No visible retractions or increased work of breathing.    SKIN: Visible skin clear. No significant rash, abnormal pigmentation or lesions.  NEURO: Cranial nerves grossly intact.  Mentation and speech appropriate for age.  PSYCH: Mentation appears normal, affect normal/bright, judgement and insight intact, normal speech and appearance " well-groomed.      Sincerely,    Fernanda Strange PA-C

## 2021-01-13 NOTE — TELEPHONE ENCOUNTER
Left message for patient to return my call to [phone].  Addy Valerio, BSN, RN   Pap Tracking Nurse

## 2021-01-13 NOTE — TELEPHONE ENCOUNTER
Will route pt's [employer] message requesting a call to discuss recent pap results to the pap nurse pool to call pt.    Kristi Hinds RN

## 2021-01-14 NOTE — TELEPHONE ENCOUNTER
Pt called back. Discussed pap results  And recommendation. Discussed actinomyces. Pt denies any pelvic pain, discharge, odor or any other abnormal sx. Pt will send message and make appt if these occur.     Discussed that we follow ASCCP guidelines and rationale for cotest in 3 years. Pt is comfortable with this but will talk to provider at next years physical if she has any questions or concerns about three year recommendation.     All questions answered and pt will call back with any other questions or concerns.    Addy Valerio, BSN, RN   Pap Tracking Nurse

## 2021-01-19 ENCOUNTER — VIRTUAL VISIT (OUTPATIENT)
Dept: SURGERY | Facility: CLINIC | Age: 46
End: 2021-01-19
Payer: COMMERCIAL

## 2021-01-19 ENCOUNTER — TELEPHONE (OUTPATIENT)
Dept: SURGERY | Facility: CLINIC | Age: 46
End: 2021-01-19

## 2021-01-19 ENCOUNTER — MYC MEDICAL ADVICE (OUTPATIENT)
Dept: FAMILY MEDICINE | Facility: OTHER | Age: 46
End: 2021-01-19

## 2021-01-19 VITALS — HEIGHT: 67 IN | BODY MASS INDEX: 38.45 KG/M2 | WEIGHT: 245 LBS

## 2021-01-19 DIAGNOSIS — Z86.39: ICD-10-CM

## 2021-01-19 DIAGNOSIS — E88.810 METABOLIC SYNDROME: ICD-10-CM

## 2021-01-19 DIAGNOSIS — I10 HTN, GOAL BELOW 140/90: ICD-10-CM

## 2021-01-19 DIAGNOSIS — E66.812 CLASS 2 SEVERE OBESITY DUE TO EXCESS CALORIES WITH SERIOUS COMORBIDITY AND BODY MASS INDEX (BMI) OF 38.0 TO 38.9 IN ADULT (H): Primary | ICD-10-CM

## 2021-01-19 DIAGNOSIS — E66.01 CLASS 2 SEVERE OBESITY DUE TO EXCESS CALORIES WITH SERIOUS COMORBIDITY AND BODY MASS INDEX (BMI) OF 38.0 TO 38.9 IN ADULT (H): Primary | ICD-10-CM

## 2021-01-19 DIAGNOSIS — E78.5 HYPERLIPIDEMIA LDL GOAL <130: ICD-10-CM

## 2021-01-19 PROCEDURE — 99417 PROLNG OP E/M EACH 15 MIN: CPT | Performed by: PHYSICIAN ASSISTANT

## 2021-01-19 PROCEDURE — 99205 OFFICE O/P NEW HI 60 MIN: CPT | Mod: 95 | Performed by: PHYSICIAN ASSISTANT

## 2021-01-19 ASSESSMENT — MIFFLIN-ST. JEOR: SCORE: 1788.94

## 2021-01-19 NOTE — TELEPHONE ENCOUNTER
Pt is coming in at 2pm tomorrow for labs. Please place orders in chart or contact pt with further instruction.    Thank you,  Zandra

## 2021-01-19 NOTE — TELEPHONE ENCOUNTER
RN spoke with patient following provider visit at which patient wanted to think about signing up for the  24 Week Plan and expressed interest in the Bariatrix product line.    Discussed with patient the 24 Week Healthy Lifestyle Program, including cost/payment, recorded cooking classes and exercise class, schedule of visits, journal and healthy habits.    Discussed with patient the Bariatrix meal replacement plan (1000 travis), including cost/ordering, cooking/preparation, tips/substitutions, and transitions.    Reviewed use of smoothie, egg, soup, pasta, bar and chip offerings.  Reviewed schedule/appointments.    Patient denies food allergies or sensitivities.    Summarized with patient--Patient plan is $499.    Reviewed contact information for questions or concerns.    Patient will wait to see if she wants to sign up for the 24 week HLP after RD visit 1/22/21.  Mary Chapman, MS, RD, RN

## 2021-01-20 DIAGNOSIS — E66.812 CLASS 2 SEVERE OBESITY DUE TO EXCESS CALORIES WITH SERIOUS COMORBIDITY AND BODY MASS INDEX (BMI) OF 38.0 TO 38.9 IN ADULT (H): ICD-10-CM

## 2021-01-20 DIAGNOSIS — E78.5 HYPERLIPIDEMIA LDL GOAL <130: ICD-10-CM

## 2021-01-20 DIAGNOSIS — E88.810 METABOLIC SYNDROME: ICD-10-CM

## 2021-01-20 DIAGNOSIS — Z86.39: ICD-10-CM

## 2021-01-20 DIAGNOSIS — I10 HTN, GOAL BELOW 140/90: ICD-10-CM

## 2021-01-20 DIAGNOSIS — E66.01 CLASS 2 SEVERE OBESITY DUE TO EXCESS CALORIES WITH SERIOUS COMORBIDITY AND BODY MASS INDEX (BMI) OF 38.0 TO 38.9 IN ADULT (H): ICD-10-CM

## 2021-01-20 LAB
HBA1C MFR BLD: 5.2 % (ref 0–5.6)
T4 FREE SERPL-MCNC: 1.39 NG/DL (ref 0.76–1.46)
TSH SERPL DL<=0.005 MIU/L-ACNC: 0.37 MU/L (ref 0.4–4)

## 2021-01-20 PROCEDURE — 84439 ASSAY OF FREE THYROXINE: CPT | Performed by: PHYSICIAN ASSISTANT

## 2021-01-20 PROCEDURE — 82306 VITAMIN D 25 HYDROXY: CPT | Performed by: PHYSICIAN ASSISTANT

## 2021-01-20 PROCEDURE — 84443 ASSAY THYROID STIM HORMONE: CPT | Performed by: PHYSICIAN ASSISTANT

## 2021-01-20 PROCEDURE — 83036 HEMOGLOBIN GLYCOSYLATED A1C: CPT | Performed by: PHYSICIAN ASSISTANT

## 2021-01-20 PROCEDURE — 36415 COLL VENOUS BLD VENIPUNCTURE: CPT | Performed by: PHYSICIAN ASSISTANT

## 2021-01-21 LAB — DEPRECATED CALCIDIOL+CALCIFEROL SERPL-MC: 19 UG/L (ref 20–75)

## 2021-01-22 ENCOUNTER — VIRTUAL VISIT (OUTPATIENT)
Dept: SURGERY | Facility: CLINIC | Age: 46
End: 2021-01-22
Payer: COMMERCIAL

## 2021-01-22 DIAGNOSIS — E66.01 MORBID OBESITY (H): ICD-10-CM

## 2021-01-22 PROCEDURE — 97802 MEDICAL NUTRITION INDIV IN: CPT | Mod: 95 | Performed by: DIETITIAN, REGISTERED

## 2021-01-22 NOTE — PROGRESS NOTES
"Video-Visit Details    Type of service:  Video Visit    Video Start Time (time video started): 11:00     Video End Time (time video stopped): 11:31    Originating Location (pt. Location): Home    Distant Location (provider location):  [facility] [place]     Mode of Communication:  Video Conference via [facility] WEIGHT LOSS INITIAL EVALUATION  DIAGNOSIS:  Obese, class II    NUTRITION HISTORY:  \"first fueling\" every 2.5 hours bar or pancake 5 times per day (not drinking protein shake as dislikes milk texture)   Lean and green meal (5 oz meat + 3 cups greens) beef or chicken  + (2 cups lettuce with shredded cheese with 2 T light ranch+ 1 cup broccoli) carrots not allowed on program   dislikes tomatoes, brussels sprouts, cooked cabbage, fish   Likes Broccoli, cauliflower and carrots  Bought mashed garlic herb cauliflower   Beverage choices: previously 2 liters Coke per day; 1 time per week Powerade Zero; 1 gallon water per day   Exercise: none -willing to start exercising once kids go back to school   Other: Patient started Optiva (medifast) program.  Eats 6 times a day with this. 5 fuelings and 1 lean (5 oz meat) and green meal. Has been on this for a week. Has lost 10 lbs in a week.  Not sure this is sustainable for her.  Patient states has not met with RD in the past and has not been on \"diet\" but has taken phentermine and lost 40 lbs.  Patient states followed Optiva program and is now a health  with program. Patient was drinking 2 liters Coke per day but stopped cold turkey last week. Patient states had headache for 4 days but has since resolved.      ANTHROPOMETRICS:  Height: 5'7\"  Weight: 245 lbs    BMI:  38.3 kg/m2  NUTRITION DIAGNOSIS:   Obese, class II related to excess energy intake as evidence by BMI of  38.3 kg/m2  NUTRITION INTERVENTIONS  Nutrition Prescription:  Recommend modified energy- nutrient intake  Implementation:  Nutrition Education " (Content):    Discussed vegetable options to change up current meal option following Optiva program   Nutrition Education (Application):     Patient to practice goals as stated below    Patient verbalizes understanding of diet by stating will try new vegetables     Expected patient engagement: good   Goals:  Focus on variety of 3 cups vegetables per day     FOLLOW UP AND MONITORING:   Other  - follow up in 4 weeks.     TIME SPENT WITH PATIENT:  31 minutes   Frank Florence, RD, LD  [facility]   [phone] (office phone)

## 2021-01-26 DIAGNOSIS — E55.9 VITAMIN D DEFICIENCY: Primary | ICD-10-CM

## 2021-02-04 DIAGNOSIS — Z12.31 VISIT FOR SCREENING MAMMOGRAM: ICD-10-CM

## 2021-02-04 PROCEDURE — 77067 SCR MAMMO BI INCL CAD: CPT | Mod: TC | Performed by: RADIOLOGY

## 2021-02-04 PROCEDURE — 77063 BREAST TOMOSYNTHESIS BI: CPT | Mod: TC | Performed by: RADIOLOGY

## 2021-02-10 ENCOUNTER — MYC MEDICAL ADVICE (OUTPATIENT)
Dept: FAMILY MEDICINE | Facility: OTHER | Age: 46
End: 2021-02-10

## 2021-02-11 NOTE — TELEPHONE ENCOUNTER
I wrote a letter for a benefit exception on 1/7/21. I have no idea where the process is from there.     Bassem Oneill PA-C  [facility]

## 2021-02-15 NOTE — TELEPHONE ENCOUNTER
They received the letter and now they sent in forms on Friday which we filled out the best we could  Please look at the highlighted areas to complete.  Forms placed in CDL- tray

## 2021-02-16 ENCOUNTER — MYC MEDICAL ADVICE (OUTPATIENT)
Dept: FAMILY MEDICINE | Facility: OTHER | Age: 46
End: 2021-02-16

## 2021-02-16 NOTE — TELEPHONE ENCOUNTER
Form completed and placed in MA task. Make sure that visit notes I printed get faxed with the form,    Bassem Oneill PA-C  [facility]

## 2021-02-16 NOTE — TELEPHONE ENCOUNTER
Forms were faxed back by Shanice (insurance) to ROSANA Bella's office and Consuelo from [employer] is helping to find the correct CPT Code for forms.  She will contact the  in [place] tomorrow with any information she can gather that may help.    Marline INFANTEO/

## 2021-02-16 NOTE — TELEPHONE ENCOUNTER
Insurance is calling and they need a CPT code for patient     [phone]  - Shanice   Ok to leave code on vm if you do not reach rep.     This has to be called to her by end of day or form will need to be redone and faxed to her again.

## 2021-02-16 NOTE — TELEPHONE ENCOUNTER
Need some information from patient, await reply     Bassem Oneill PA-C  [facility]

## 2021-02-22 ENCOUNTER — VIRTUAL VISIT (OUTPATIENT)
Dept: SURGERY | Facility: CLINIC | Age: 46
End: 2021-02-22
Payer: COMMERCIAL

## 2021-02-22 VITALS — BODY MASS INDEX: 38.02 KG/M2 | HEIGHT: 66 IN | WEIGHT: 236.6 LBS

## 2021-02-22 DIAGNOSIS — E66.812 CLASS 2 SEVERE OBESITY DUE TO EXCESS CALORIES WITH SERIOUS COMORBIDITY AND BODY MASS INDEX (BMI) OF 38.0 TO 38.9 IN ADULT (H): Primary | ICD-10-CM

## 2021-02-22 DIAGNOSIS — I10 HTN, GOAL BELOW 140/90: ICD-10-CM

## 2021-02-22 DIAGNOSIS — E66.01 CLASS 2 SEVERE OBESITY DUE TO EXCESS CALORIES WITH SERIOUS COMORBIDITY AND BODY MASS INDEX (BMI) OF 38.0 TO 38.9 IN ADULT (H): Primary | ICD-10-CM

## 2021-02-22 PROCEDURE — 99214 OFFICE O/P EST MOD 30 MIN: CPT | Mod: 95 | Performed by: FAMILY MEDICINE

## 2021-02-22 ASSESSMENT — MIFFLIN-ST. JEOR: SCORE: 1738.93

## 2021-02-22 NOTE — PATIENT INSTRUCTIONS

## 2021-02-22 NOTE — PROGRESS NOTES
Fabiana is a 45 year old who is being evaluated via a billable video visit.      If the video visit is dropped, the invitation should be resent by: Text to cell phone: [phone]  Will anyone else be joining your video visit? No      Video-Visit Details    Type of service:  Video Visit    Video Start Time: 10:30    Video End Time:10:48 AM        Originating Location (pt. Location): Home    Distant Location (provider location):  [facility] [place]     Platform used for Video Visit: [facility]    Bariatric Care Clinic Non Surgical Follow up Visit   Date of visit: 2/22/2021  Physician: JHONATAN Verma MD, MD  Primary Care is Letty Oneill  Andree Kennedy   45 year old  female     Initial Weight: 245#  Initial BMI: 38.37  Today's Weight:   Wt Readings from Last 1 Encounters:   02/22/21 107.3 kg (236 lb 9.6 oz)     Body mass index is 37.9 kg/m .           Assessment and Plan   Assessment: Andree is a 45 year old year old female who presents for medical weight management.      Plan:     Diagnosis Comments   1. Class 2 severe obesity due to excess calories with serious comorbidity and body mass index (BMI) of 38.0 to 38.9 in adult (H)  Patient was congratulated on her success thus far. Healthy habits to assist with further weight loss were discussed. She will start tracking her steps and try adding some resistance training. She will continue medifast. She does not want to start any medication today.    2. HTN, goal below 140/90  This may improve with healthy habits and weight loss.       Follow up in 1 month with Fernanda or myself           INTERIM HISTORY  She is doing medifast and is down 17# since 1/10/21.     DIETARY HISTORY  Meals Per Day: 3  Eating Protein First?: yes  Food Diary: 5 medifast products (bars, protein pancake, brownie) and 1 meal ( meat and vegetable and salad)  Snacks Per Day: 2  Typical Snack: medifast products  Fluid Intake: 1 gallon most  "days  Portion Control: yes per patinet  Calorie Containing Beverages: stopped soda  Typical Protein Food Choices: meat  Meals at Restaurant per week:occasional, will try to have lean and green meal    Positive Changes Since Last Visit: stopped soda, water intake  Struggling With: exercise    Knowledgeable in Reading Food Labels: not sure  Getting Adequate Protein: yes    PHYSICAL ACTIVITY PATTERNS:  Cardiovascular: has a membership to [employer] but hasn't been going with Covid, she is trying to walk more  Strength Training: none    REVIEW OF SYSTEMS  GENERAL/CONSTITUTIONAL:  Fatigue: not discussed  PSYCHIATRIC:  Moods: stable  ENDOCRINE:  Monitoring Blood Sugars: na  Sugars Well Controlled: na       Patient Profile   Social History     Social History Narrative     Not on file        Past Medical History   Past Medical History:   Diagnosis Date     Anxiety      Bipolar 1 disorder (H)      Depressive disorder      Papanicolaou smear of cervix with low grade squamous intraepithelial lesion (LGSIL) 11/3/15, 11/4/16    + HR HPV     Patient Active Problem List   Diagnosis     Anxiety state     Headache, post-traumatic     Neck pain     Headaches, tension     Cervical radiculopathy     Neural foraminal stenosis of cervical spine     Cervical spondylosis     Postpartum hypertension     HTN, goal below 140/90     Obesity     Papanicolaou smear of cervix with low grade squamous intraepithelial lesion (LGSIL)     Hyperlipidemia LDL goal <130     Mild episode of recurrent major depressive disorder (H)     Bipolar I disorder (H)     Seasonal affective disorder (H)     Morbid obesity (H)     Chronic seasonal allergic rhinitis     IUD (intrauterine device) in place         Past Surgical History  She has a past surgical history that includes no history of surgery.     Examination   Ht 1.683 m (5' 6.25\")   Wt 107.3 kg (236 lb 9.6 oz)   BMI 37.90 kg/m      GENERAL: Healthy, alert and no distress  EYES: Eyes grossly normal to " inspection.  No discharge or erythema, or obvious scleral/conjunctival abnormalities.  RESP: No audible wheeze, cough, or visible cyanosis.  No visible retractions or increased work of breathing.    SKIN: Visible skin clear. No significant rash, abnormal pigmentation or lesions.  NEURO: Cranial nerves grossly intact.  Mentation and speech appropriate for age.  PSYCH: Mentation appears normal, affect normal/bright, judgement and insight intact, normal speech and appearance well-groomed.         Counseling:   We reviewed the important post op bariatric recommendations:  -eating 3 meals daily  -eating protein first, getting >60gm protein daily  -eating slowly, chewing food well  -avoiding/limiting calorie containing beverages  -limiting starchy vegetables and carbohydrates, choosing wheat, not white with breads,   crackers, pastas, soy, bagels, tortillas, rice  -limiting restaurant or cafeteria eating to twice a week or less    We discussed the importance of restorative sleep and stress management in maintaining a healthy weight.  We discussed the National Weight Control Registry healthy weight maintenance strategies and ways to optimize metabolism.  We discussed the importance of physical activity including cardiovascular and strength training in maintaining a healthier weight.    Total time spent on the date of this encounter doing: chart review, review of test results, patient visit, physical exam, education, counseling, developing plan of care and documenting = 31 minutes.        JHONATAN Verma MD  [facility]

## 2021-02-26 ENCOUNTER — MYC MEDICAL ADVICE (OUTPATIENT)
Dept: FAMILY MEDICINE | Facility: OTHER | Age: 46
End: 2021-02-26

## 2021-02-26 NOTE — TELEPHONE ENCOUNTER
Please let her know our appeals to get it covered were denied.     Bassem Oneill PA-C  [facility]

## 2021-03-19 ENCOUNTER — TELEPHONE (OUTPATIENT)
Dept: SURGERY | Facility: CLINIC | Age: 46
End: 2021-03-19

## 2021-03-19 NOTE — TELEPHONE ENCOUNTER
Pt expressed interest in 24-wk HLP. F/u from [facility] 1/27/21 [facility]. Invited pt to call [facility] if is curious to learn more about this plan or health coaching services at [facility]. - GN

## 2021-03-26 ENCOUNTER — TELEPHONE (OUTPATIENT)
Dept: SURGERY | Facility: CLINIC | Age: 46
End: 2021-03-26

## 2021-03-26 NOTE — TELEPHONE ENCOUNTER
Quick vm follow up from 3/19 vm letting pt know new features of 24wk hlp.    Provided pt information on how to schedule Q&A if she is interested.    -GN

## 2021-04-22 ENCOUNTER — VIRTUAL VISIT (OUTPATIENT)
Dept: FAMILY MEDICINE | Facility: OTHER | Age: 46
End: 2021-04-22
Payer: COMMERCIAL

## 2021-04-22 DIAGNOSIS — L65.9 HAIR LOSS: Primary | ICD-10-CM

## 2021-04-22 DIAGNOSIS — E66.01 MORBID OBESITY (H): ICD-10-CM

## 2021-04-22 PROCEDURE — 99214 OFFICE O/P EST MOD 30 MIN: CPT | Mod: 95 | Performed by: PHYSICIAN ASSISTANT

## 2021-04-22 NOTE — PROGRESS NOTES
Fabiana is a 45 year old who is being evaluated via a billable telephone visit.      What phone number would you like to be contacted at? [phone]  How would you like to obtain your AVS? MyChart    Assessment & Plan     ICD-10-CM    1. Hair loss  L65.9      - Patient reporting several weeks of hair loss, diffuse, presenting today because she is worried about a thyroid issue   - Possible etiology endocrine (thyroid vs. Other) vs. Anemia vs. Stress vs. Weight loss vs vitamin/dietary deficiency   - Recommend labs to rule out reversible causes   - Results will be communicated to patient when available and appropriate medication changes made if necessary   - Recommend daily multi-vitamin in light of patient's recent 30 lbs weight loss   - Recommend if labs normal, continue to monitor, if persists, will refer to dermatology     Review of the result(s) of each unique test - See list       1/20/21 - TSH, T4   Diagnosis or treatment significantly limited by social determinants of health - Depression     30 minutes spent on the date of the encounter doing chart review, history and exam, documentation and further activities as noted above    The patient indicates understanding of these issues and agrees with the plan.    Follow up: pending labs     Letty Oneill PA-C  [facility]   Fabiana is a 45 year old who presents for the following health issues     HPI   Pt refused to answer questions.     She stated she made the appoinement at the time she made it because that's when she has time to talk. She stated she is extremely busy in the morning. With her history and medications I am unsure if they are accurate as she just continued to respond she already went through this on [employer]. She states she requested one be removed this has been flagged but wants to know why I dont just take it off now. Why does she have this option if I am still going call her 40  minutes before her appt to ask the same questions. I told her how our phone visits work and she said well that was never explained to be. The patient sounded winded and you could hear things banging around behind her. I said I had a some more questions, she stated she was busy right now and didn't expect to do this. I told her I would have the provider call instead at her appointment time, she hung up.      Concern - Hair loss, concerned about thyroid issue   Onset: couple weeks   Description: hair loss   Progression of Symptoms:  same  Accompanying Signs & Symptoms: weight loss (intentional)   Previous history of similar problem: no   Therapies tried and outcome: Decreasing her showers     - On Optiva   - Losing weight 30 lbs, working with pain clinic   - Hair loss when out of shower (showers 2x/day)   - No bald spots, diffuse thinning   - Stress - some   - Anxiety and depression, still working with specialist and taking her meds       Review of Systems   Constitutional, HEENT, cardiovascular, pulmonary, gi, psych, neuro, msk, and gu systems are negative, except as otherwise noted.      Objective       Vitals:  No vitals were obtained today due to virtual visit.    Physical Exam   healthy, alert and no distress  PSYCH: Alert and oriented times 3; coherent speech, normal   rate and volume, able to articulate logical thoughts, able   to abstract reason, no tangential thoughts, no hallucinations   or delusions  Her affect is normal  RESP: No cough, no audible wheezing, able to talk in full sentences  Remainder of exam unable to be completed due to telephone visits    Diagnostics  Reviewed in Epic  See orders pending in Epic       Phone call duration: 11 minutes and 20 seconds

## 2021-04-27 DIAGNOSIS — E66.01 MORBID OBESITY (H): ICD-10-CM

## 2021-04-27 DIAGNOSIS — E55.9 VITAMIN D DEFICIENCY: ICD-10-CM

## 2021-04-27 DIAGNOSIS — L65.9 HAIR LOSS: ICD-10-CM

## 2021-04-27 LAB
ERYTHROCYTE [DISTWIDTH] IN BLOOD BY AUTOMATED COUNT: 13.7 % (ref 10–15)
FERRITIN SERPL-MCNC: 54 NG/ML (ref 8–252)
FOLATE SERPL-MCNC: 18.1 NG/ML
HCT VFR BLD AUTO: 37.3 % (ref 35–47)
HGB BLD-MCNC: 12.6 G/DL (ref 11.7–15.7)
IRON SATN MFR SERPL: 21 % (ref 15–46)
IRON SERPL-MCNC: 53 UG/DL (ref 35–180)
MCH RBC QN AUTO: 31.8 PG (ref 26.5–33)
MCHC RBC AUTO-ENTMCNC: 33.8 G/DL (ref 31.5–36.5)
MCV RBC AUTO: 94 FL (ref 78–100)
PLATELET # BLD AUTO: 292 10E9/L (ref 150–450)
PTH-INTACT SERPL-MCNC: 50 PG/ML (ref 18–80)
RBC # BLD AUTO: 3.96 10E12/L (ref 3.8–5.2)
T3 SERPL-MCNC: 115 NG/DL (ref 60–181)
T3FREE SERPL-MCNC: 2.3 PG/ML (ref 2.3–4.2)
T4 FREE SERPL-MCNC: 1.12 NG/DL (ref 0.76–1.46)
TIBC SERPL-MCNC: 257 UG/DL (ref 240–430)
TSH SERPL DL<=0.005 MIU/L-ACNC: 0.34 MU/L (ref 0.4–4)
VIT B12 SERPL-MCNC: 513 PG/ML (ref 193–986)
WBC # BLD AUTO: 7.4 10E9/L (ref 4–11)

## 2021-04-27 PROCEDURE — 82306 VITAMIN D 25 HYDROXY: CPT | Performed by: PHYSICIAN ASSISTANT

## 2021-04-27 PROCEDURE — 84443 ASSAY THYROID STIM HORMONE: CPT | Performed by: PHYSICIAN ASSISTANT

## 2021-04-27 PROCEDURE — 85027 COMPLETE CBC AUTOMATED: CPT | Performed by: PHYSICIAN ASSISTANT

## 2021-04-27 PROCEDURE — 83540 ASSAY OF IRON: CPT | Performed by: PHYSICIAN ASSISTANT

## 2021-04-27 PROCEDURE — 82728 ASSAY OF FERRITIN: CPT | Performed by: PHYSICIAN ASSISTANT

## 2021-04-27 PROCEDURE — 84481 FREE ASSAY (FT-3): CPT | Performed by: PHYSICIAN ASSISTANT

## 2021-04-27 PROCEDURE — 84439 ASSAY OF FREE THYROXINE: CPT | Performed by: PHYSICIAN ASSISTANT

## 2021-04-27 PROCEDURE — 82746 ASSAY OF FOLIC ACID SERUM: CPT | Performed by: PHYSICIAN ASSISTANT

## 2021-04-27 PROCEDURE — 36415 COLL VENOUS BLD VENIPUNCTURE: CPT | Performed by: PHYSICIAN ASSISTANT

## 2021-04-27 PROCEDURE — 82607 VITAMIN B-12: CPT | Performed by: PHYSICIAN ASSISTANT

## 2021-04-27 PROCEDURE — 83970 ASSAY OF PARATHORMONE: CPT | Performed by: PHYSICIAN ASSISTANT

## 2021-04-27 PROCEDURE — 83550 IRON BINDING TEST: CPT | Performed by: PHYSICIAN ASSISTANT

## 2021-04-27 PROCEDURE — 86376 MICROSOMAL ANTIBODY EACH: CPT | Performed by: PHYSICIAN ASSISTANT

## 2021-04-27 PROCEDURE — 84480 ASSAY TRIIODOTHYRONINE (T3): CPT | Performed by: PHYSICIAN ASSISTANT

## 2021-04-28 LAB
DEPRECATED CALCIDIOL+CALCIFEROL SERPL-MC: 26 UG/L (ref 20–75)
THYROPEROXIDASE AB SERPL-ACNC: 41 IU/ML

## 2021-04-28 NOTE — RESULT ENCOUNTER NOTE
Shari Candelario    Your vitamin D results were at the low end of normal. I would recommend over the counter supplement of vitamin D 1,000 IU per day.     The results are attached for your review.       Bassem Oneill PA-C

## 2021-04-28 NOTE — RESULT ENCOUNTER NOTE
Shari Candelario    Your results were normal so far. A couple labs still pending. TSH (thyroid lab) has been just barely low consistently when we check, this is likely a normal variant since the more detailed thyroid labs are in normal range (T3/T4). Nothing yet that could be causing the hair loss.     The results are attached for your review.       Bassem Oneill PA-C

## 2021-05-10 ENCOUNTER — VIRTUAL VISIT (OUTPATIENT)
Dept: SURGERY | Facility: CLINIC | Age: 46
End: 2021-05-10
Payer: COMMERCIAL

## 2021-05-10 VITALS — WEIGHT: 223 LBS | HEIGHT: 66 IN | BODY MASS INDEX: 35.84 KG/M2

## 2021-05-10 DIAGNOSIS — E66.9 OBESITY (BMI 30-39.9): Primary | ICD-10-CM

## 2021-05-10 DIAGNOSIS — Z86.69 HISTORY OF MIGRAINE HEADACHES: ICD-10-CM

## 2021-05-10 DIAGNOSIS — I10 HTN, GOAL BELOW 140/90: ICD-10-CM

## 2021-05-10 PROCEDURE — 99214 OFFICE O/P EST MOD 30 MIN: CPT | Mod: 95 | Performed by: FAMILY MEDICINE

## 2021-05-10 RX ORDER — TOPIRAMATE 50 MG/1
TABLET, FILM COATED ORAL
Qty: 90 TABLET | Refills: 1 | Status: SHIPPED | OUTPATIENT
Start: 2021-05-10 | End: 2022-11-16

## 2021-05-10 ASSESSMENT — MIFFLIN-ST. JEOR: SCORE: 1677.24

## 2021-05-10 NOTE — PROGRESS NOTES
Fabiana is a 45 year old who is being evaluated via a billable video visit.      If the video visit is dropped, the invitation should be resent by: Text to cell phone: [phone]  Will anyone else be joining your video visit? No    Video-Visit Details    Type of service:  Video Visit    Video Start Time: 11:02 AM    Video End Time:11:27 AM        Originating Location (pt. Location): Home    Distant Location (provider location):  [facility] SURGICAL WEIGHT LOSS CLINIC [place]     Platform used for Video Visit: [facility]      Bariatric Care Clinic Non Surgical Follow up Visit   Date of visit: 5/10/2021  Physician: JHONATAN Verma MD, MD  Primary Care is Letty Oneill  Andree Kennedy   45 year old  female     Initial Weight: 245#  Initial BMI: 38.37  Today's Weight:   Wt Readings from Last 1 Encounters:   05/10/21 101.2 kg (223 lb)     Body mass index is 35.72 kg/m .           Assessment and Plan   Assessment: Andree is a 45 year old year old female who presents for medical weight management.      Plan:    1. Obesity (BMI 30-39.9)  Patient was congratulated on her success thus far. Healthy habits to assist with further weight loss were discussed. She will try to decrease her portions of meat at her lean and green meal. She will add some resistance training. She will be transitioning off of the medifast meal replacements soon. She will start topamax. Risks, benefits and possible side effects discussed.    2. HTN, goal below 140/90  This may improve with healthy habits and weight loss.    Follow up next available with our dietician and in 1 month with myself           INTERIM HISTORY  She feels her weight loss has plateau ed over the past few weeks. She continues to do optivia. She is having some hair loss. Recent TSH was normal. Vitamin D was low    DIETARY HISTORY  Eating Protein First?: yes  Food Diary: 5 protein bars D:small salad and vegetable or protein - 5-7 oz after  cooking  Snacks Per Day: occasionally will have a few fries or a few bites of something someone else is eating. Handful of almonds, sugar free jello  Fluid Intake: >100 oz  Portion Control: meat at dinner can get large  Calorie Containing Beverages: none  Typical Protein Food Choices: protein bars, meat  Choosing Whole Grains: na  Meals at Restaurant per week:none      Positive Changes Since Last Visit: rare snacking, increased activity, water intake, not drinking soda  Struggling With: sleep    Knowledgeable in Reading Food Labels: yes  Getting Adequate Protein: yes  Sleeping 7-8 hours/day trying  Stress management not discussed    PHYSICAL ACTIVITY PATTERNS:  Cardiovascular: walks, bike rides  Strength Training: [facility] [employer]    REVIEW OF SYSTEMS  GENERAL/CONSTITUTIONAL:  Fatigue: yes  HEENT:  Vision changes, glaucoma: no  NEUROLOGIC:  Paresthesias: no  PSYCHIATRIC:  Moods: stable  ENDOCRINE:  Monitoring Blood Sugars: na  Sugars Well Controlled: na       Patient Profile   Social History     Social History Narrative     Not on file        Past Medical History   Past Medical History:   Diagnosis Date     Anxiety      Bipolar 1 disorder (H)      Depressive disorder      Papanicolaou smear of cervix with low grade squamous intraepithelial lesion (LGSIL) 11/3/15, 11/4/16    + HR HPV     Patient Active Problem List   Diagnosis     Anxiety state     Headache, post-traumatic     Neck pain     Headaches, tension     Cervical radiculopathy     Neural foraminal stenosis of cervical spine     Cervical spondylosis     Postpartum hypertension     HTN, goal below 140/90     Obesity     Papanicolaou smear of cervix with low grade squamous intraepithelial lesion (LGSIL)     Hyperlipidemia LDL goal <130     Mild episode of recurrent major depressive disorder (H)     Bipolar I disorder (H)     Seasonal affective disorder (H)     Morbid obesity (H)     Chronic seasonal allergic rhinitis     IUD (intrauterine device) in  "place         Past Surgical History  She has a past surgical history that includes no history of surgery.     Examination   Ht 1.683 m (5' 6.25\")   Wt 101.2 kg (223 lb)   BMI 35.72 kg/m    GENERAL: Healthy, alert and no distress  EYES: Eyes grossly normal to inspection.  No discharge or erythema, or obvious scleral/conjunctival abnormalities.  RESP: No audible wheeze, cough, or visible cyanosis.  No visible retractions or increased work of breathing.    SKIN: Visible skin clear. No significant rash, abnormal pigmentation or lesions.  NEURO: Cranial nerves grossly intact.  Mentation and speech appropriate for age.  PSYCH: Mentation appears normal, affect normal/bright, judgement and insight intact, normal speech and appearance well-groomed.       Counseling:   We reviewed the important post op bariatric recommendations:  -eating 3 meals daily  -eating protein first, getting >60gm protein daily  -eating slowly, chewing food well  -avoiding/limiting calorie containing beverages  -limiting starchy vegetables and carbohydrates, choosing wheat, not white with breads,   crackers, pastas, soy, bagels, tortillas, rice  -limiting restaurant or cafeteria eating to twice a week or less    We discussed the importance of restorative sleep and stress management in maintaining a healthy weight.  We discussed the National Weight Control Registry healthy weight maintenance strategies and ways to optimize metabolism.  We discussed the importance of physical activity including cardiovascular and strength training in maintaining a healthier weight.    Total time spent on the date of this encounter doing: chart review, review of test results, patient visit, physical exam, education, counseling, developing plan of care and documenting = 35 minutes.         JHONATAN Verma MD  [facility]           "

## 2021-05-21 ENCOUNTER — TELEPHONE (OUTPATIENT)
Dept: SURGERY | Facility: CLINIC | Age: 46
End: 2021-05-21

## 2021-05-21 NOTE — PROGRESS NOTES
Sent link for scheduled appointment at 11:00 AM 5/21/2021.  Called patient also.  Patient returned RD phone call stating needed to cancel appointment due to emergency.  Patient instructed to call and reschedule appointment at her convenience.  Patient verbalized understanding of plan.    Frank Florence, RD, LD  [facility]patient Dietitian/[facility]  [phone] (office phone)

## 2021-06-09 NOTE — PROGRESS NOTES
Fabiana is a 45 year old who is being evaluated via a billable telephone visit.      What phone number would you like to be contacted at? See demographics  How would you like to obtain your AVS? MyChart    Assessment & Plan     ICD-10-CM    1. Chronic seasonal allergic rhinitis  J30.2        - Discussed allergic rhinitis at length including etiology and treatment options   - Recommend stopping Benadryl, as this is likely the cause of her fatigue   - Discussed daily antihistamine like Claritin, allegra, zyrtec, or zyzal      Discussed how these work and side effects  - Also recommend nasal anti-histamine like Flonase      Discussed use and side effects   - Try this for 2-3 weeks, if doesn't improve, then could add in Pataday drops and/or Singulair   - Hand outs given       Review of the result(s) of each unique test - None    Diagnosis or treatment significantly limited by social determinants of health - none     20 minutes spent on the date of the encounter doing chart review, history and exam, documentation and further activities as noted above    The patient indicates understanding of these issues and agrees with the plan.    Return in about 3 weeks (around 7/1/2021) for if not improving.    ARIA Navarrete [facility]   Fabiana is a 45 year old who presents for the following health issues     HPI   Allergies  Onset/Duration: few weeks   Symptoms:   Nasal congestion: YES  Sneezing: no  Red, itchy eyes: YES  Progression of Symptoms: same  Accompanying Signs & Symptoms:  Cough: YES, sometimes dry sometimes sputum   Wheezing: no  Rash: no  Sinus/facial pain: YES  History:   Is it seasonal: in the fall and in the summer   History of Asthma: no  Has allergy testing been done: no  Precipitating factors:   Pollens   Alleviating factors:  None  Therapies tried and outcome: Benadryl     - OTC medications - Benadryl - taking every 4 hours for the past  few weeks   - Really tired all the time             Review of Systems   Constitutional, HEENT, cardiovascular, pulmonary, gi and gu systems are negative, except as otherwise noted.      Objective     Vitals:  No vitals were obtained today due to virtual visit.    Physical Exam   healthy, alert and no distress  PSYCH: Alert and oriented times 3; coherent speech, normal   rate and volume, able to articulate logical thoughts, able   to abstract reason, no tangential thoughts, no hallucinations   or delusions  Her affect is normal  RESP: No cough, no audible wheezing, able to talk in full sentences  Remainder of exam unable to be completed due to telephone visits    Diagnostics: none         Phone call duration: 9 minutes

## 2021-06-10 ENCOUNTER — VIRTUAL VISIT (OUTPATIENT)
Dept: SURGERY | Facility: CLINIC | Age: 46
End: 2021-06-10
Payer: COMMERCIAL

## 2021-06-10 ENCOUNTER — VIRTUAL VISIT (OUTPATIENT)
Dept: FAMILY MEDICINE | Facility: OTHER | Age: 46
End: 2021-06-10
Payer: COMMERCIAL

## 2021-06-10 VITALS — HEIGHT: 66 IN | BODY MASS INDEX: 35.68 KG/M2 | WEIGHT: 222 LBS

## 2021-06-10 DIAGNOSIS — H10.13 ALLERGIC CONJUNCTIVITIS, BILATERAL: ICD-10-CM

## 2021-06-10 DIAGNOSIS — E66.9 OBESITY (BMI 30-39.9): Primary | ICD-10-CM

## 2021-06-10 DIAGNOSIS — J30.2 CHRONIC SEASONAL ALLERGIC RHINITIS: Primary | ICD-10-CM

## 2021-06-10 DIAGNOSIS — I10 HTN, GOAL BELOW 140/90: ICD-10-CM

## 2021-06-10 PROCEDURE — 99213 OFFICE O/P EST LOW 20 MIN: CPT | Mod: 95 | Performed by: FAMILY MEDICINE

## 2021-06-10 PROCEDURE — 99213 OFFICE O/P EST LOW 20 MIN: CPT | Mod: 95 | Performed by: PHYSICIAN ASSISTANT

## 2021-06-10 ASSESSMENT — MIFFLIN-ST. JEOR: SCORE: 1672.71

## 2021-06-10 NOTE — PATIENT INSTRUCTIONS
1. Start daily Zyrtec     2. Start Flonase - 1-2 sprays up each side first thing in AM     3. Nasal saline - every night before bed (Simply Saline)     4. Try for 2-3 weeks, if doesn't improve will add in Singulair        Patient Education     Understanding Nasal Allergies  Nasal allergies (also called allergic rhinitis) are a common health problem. They may be seasonal. This means they cause symptoms only at certain times of the year. Or they may be perennial. This means they cause symptoms all year long. Other health problems, such as asthma, often occur along with allergies as well.    What is an allergen?  An allergy is a reaction to a substance called an allergen. Common allergens include:    Wind-borne pollen (from grass, trees, ragweed)    Mold    Dust mites    Furry and feathered animals    Pests, such as cockroaches or rodents in a home or other building  Normally allergens are harmless. But when a person has allergies, the body thinks these allergens are harmful. The body then attacks allergens with antibodies. Allergy antibodies are attached to special cells called mast cells. Allergens stick to the antibodies. This makes the mast cells release histamine and other chemicals. This is an allergic reaction. The chemicals irritate nearby nasal tissue. This causes nasal allergy symptoms. When this happens in the breathing tubes of the lungs, it can cause asthma symptoms such as cough and wheeze.  Common nasal allergy symptoms  Allergies can cause nasal tissue to swell. This makes the air passages smaller. The nose may feel stuffed up or itchy. The nose may also make extra mucus. This can plug the nasal passages or drip out of the nose. Mucus can drip down the back of the throat (postnasal drip) as well. Sinus tissue can swell. This may cause pain and headache. Common allergy symptoms include:    Runny nose with clear, watery discharge    Stuffy nose (nasal congestion)    Drainage down your throat (postnasal  drip)    Sneezing    Red, watery eyes    Itchy nose, eyes, ears, and throat    Plugged-up ears (ear congestion)    Sore throat    Coughing    Sinus pain and swelling    Headache  It may not be allergies  Other health problems can cause symptoms like those of nasal allergies. These include:    Nonallergic rhinitis and viruses such as colds    Irritants and pollutants such as strong odors or smoke    Common home cleaning products, especially if they have strong odors.    Certain medicines    Changes in the weather  Treatment  Your healthcare provider will evaluate you to find the cause of your symptoms. Then he or she will advise treatment. If your symptoms are due to nasal allergies, your provider may prescribe nasal steroid sprays or antihistamines taken by mouth (oral) to help reduce symptoms. Staying away from the allergen will also be suggested. You may also be referred to an allergist.   Frantz last reviewed this educational content on 5/1/2019 2000-2021 [employer]. All rights reserved. This information is not intended as a substitute for professional medical care. Always follow your healthcare professional's instructions.           Patient Education     Allergic Rhinitis  Allergic rhinitis is an allergic reaction that affects the nose, and often the eyes. It s often known as nasal allergies. Nasal allergies are often due to things in the environment that are breathed in. Depending what you are sensitive to, nasal allergies may occur only during certain seasons, or they may occur year round. Common indoor allergens include house dust mites, mold, cockroaches, and pet dander. Outdoor allergens include pollen from trees, grasses, and weeds.    Symptoms include a drippy, stuffy, and itchy nose. They also include sneezing and red and itchy eyes. You may feel tired more often. Severe allergies may also affect your breathing and trigger a condition called asthma.    Tests can be done to see what  allergens are affecting you. You may be referred to an allergy specialist for testing and further evaluation.   Home care  Your healthcare provider may prescribe medicines to help relieve allergy symptoms. These may include oral medicines, nasal sprays, or eye drops.   Ask your provider for advice on how to stay away from substances that you are allergic to. Below are a few tips for each type of allergen.   Pet dander:    Do not have pets with fur and feathers.    If you have a pet, keep it out of your bedroom and off upholstered furniture.  Pollen:    When pollen counts are high, keep windows of your car and home closed. If possible, use an air conditioner instead.    Wear a filter mask when mowing or doing yard work.  House dust mites:    Wash bedding every week in warm water and detergent and dry on a hot setting.    Cover the mattress, box spring, and pillows with allergy covers.     If possible, sleep in a room with no carpet, curtains, or upholstered furniture.  Cockroaches:    Store food in sealed containers.    Remove garbage from the home promptly.    Fix water leaks.  Mold:    Keep humidity low by using a dehumidifier or air conditioner. Keep the dehumidifier and air conditioner clean and free of mold.    Clean moldy areas with bleach and water. Don't mix bleach with other .  In general:    Vacuum once or twice a week. If possible, use a vacuum with a high-efficiency particulate air (HEPA) filter.    Don't smoke. Stay away from cigarette smoke. Cigarette smoke is an irritant that can make symptoms worse.  Follow-up care  Follow up as advised by the healthcare provider or our staff. If you were referred to an allergy specialist, make this appointment promptly.   When to seek medical advice  Call your healthcare provider or get medical care right away if the following occur:     Coughing    Fever of 100.4 F (38 C) or higher, or as directed by your healthcare provider    Raised red bumps  (hives)    Continuing symptoms, new symptoms, or worsening symptoms  Call 911  Call 911 if you have:     Trouble breathing    Severe swelling of the face or severe itching of the eyes or mouth    Wheezing or shortness of breath    Chest tightness    Dizziness or lightheadedness    Feeling of doom    Stomach pain, bloating, vomiting, or diarrhea  Frantz last reviewed this educational content on 10/1/2019    3730-0370 [employer]. All rights reserved. This information is not intended as a substitute for professional medical care. Always follow your healthcare professional's instructions.

## 2021-06-10 NOTE — PROGRESS NOTES
Fabiana is a 45 year old who is being evaluated via a billable video visit.      If the video visit is dropped, the invitation should be resent by: Text to cell phone: [phone]  Will anyone else be joining your video visit? No      Video-Visit Details    Type of service:  Video Visit    Video Start Time: 11:25 AM    Video End Time:11:38 AM        Originating Location (pt. Location): Home    Distant Location (provider location):  [facility] [place]     Platform used for Video Visit: [facility]        Bariat[facility] Care Clinic Non Surgical Follow up Visit   Date of visit: 6/10/2021  Physician: JHONATAN Verma MD, MD  Primary Care is Letty Oneill  Andree Kennedy   45 year old  female     Initial Weight: 245#  Initial BMI: 38.37  Today's Weight:   Wt Readings from Last 1 Encounters:   06/10/21 222 lb (100.7 kg)     Body mass index is 35.56 kg/m .           Assessment and Plan   Assessment: Andree is a 45 year old year old female who presents for medical weight management.      Plan:    1. Obesity (BMI 30-39.9)  Patient was congratulated on her success thus far. Healthy habits to assist with further weight loss were discussed. She plans to continue the optivia product for another month or so. She will reschedule her visit with our dietician. She will try to swim more on the really hot days. Once her allergies have cleared up she will start the topamax    2. HTN, goal below 140/90  This may improve with healthy habits and weight loss.    Follow up in 1 month with myself           INTERIM HISTORY  Patient did not start the topamax prescribed because she went on vacation and then had difficulty with her allergies.     DIETARY HISTORY  Eating Protein First?: 5 optiva bars a day and one lean and green meal  Food Diary: B:protein bar (optivia) L:optiva product D:steak and salad or broccoli, sometimes with cheese  Snacks Per Day: 3-4  Typical Snack: watermelon,  optivia protein  Fluid Intake: 64 oz  Portion Control: yes  Calorie Containing Beverages: [employer] orange once a week  Typical Protein Food Choices: meat, optivia bar  Meals at Restaurant per week:0-1    Positive Changes Since Last Visit: She is much more mindful around eating  Struggling With: none    Knowledgeable in Reading Food Labels: yes  Getting Adequate Protein: yes  Sleeping 7-8 hours/day not discussed  Stress management not discussed    PHYSICAL ACTIVITY PATTERNS:  Walks every day for 1 - 1/2 hours or [place]    REVIEW OF SYSTEMS  GENERAL/CONSTITUTIONAL:  Fatigue: no  HEENT:  Vision changes, glaucoma: no  CARDIOVASCULAR:  Chest Pain with Exertion: no  PULMONARY:  Dyspnea on exertion: yes  NEUROLOGIC:  Paresthesias: no  PSYCHIATRIC:  Moods: stable  ENDOCRINE:  Monitoring Blood Sugars: na  Sugars Well Controlled: na  :  Birth control: mirena       Patient Profile   Social History     Social History Narrative     Not on file        Past Medical History   Past Medical History:   Diagnosis Date     Anxiety      Bipolar 1 disorder (H)      Depressive disorder      Papanicolaou smear of cervix with low grade squamous intraepithelial lesion (LGSIL) 11/3/15, 11/4/16    + HR HPV     Patient Active Problem List   Diagnosis     Anxiety state     Headache, post-traumatic     Neck pain     Headaches, tension     Cervical radiculopathy     Neural foraminal stenosis of cervical spine     Cervical spondylosis     Postpartum hypertension     HTN, goal below 140/90     Obesity     Papanicolaou smear of cervix with low grade squamous intraepithelial lesion (LGSIL)     Hyperlipidemia LDL goal <130     Mild episode of recurrent major depressive disorder (H)     Bipolar I disorder (H)     Seasonal affective disorder (H)     Morbid obesity (H)     Chronic seasonal allergic rhinitis     IUD (intrauterine device) in place       Past Surgical History  She has a past surgical history that includes no history of  "surgery.     Examination   Ht 5' 6.25\" (1.683 m)   Wt 222 lb (100.7 kg)   BMI 35.56 kg/m    GENERAL: Healthy, alert and no distress  EYES: Eyes grossly normal to inspection.  No discharge or erythema, or obvious scleral/conjunctival abnormalities.  RESP: No audible wheeze, cough, or visible cyanosis.  No visible retractions or increased work of breathing.    SKIN: Visible skin clear. No significant rash, abnormal pigmentation or lesions.  NEURO: Cranial nerves grossly intact.  Mentation and speech appropriate for age.  PSYCH: Mentation appears normal, affect normal/bright, judgement and insight intact, normal speech and appearance well-groomed.       Counseling:   We reviewed the important post op bariatric recommendations:  -eating 3 meals daily  -eating protein first, getting >60gm protein daily  -eating slowly, chewing food well  -avoiding/limiting calorie containing beverages  -limiting starchy vegetables and carbohydrates, choosing wheat, not white with breads,   crackers, pastas, soy, bagels, tortillas, rice  -limiting restaurant or cafeteria eating to twice a week or less    We discussed the importance of restorative sleep and stress management in maintaining a healthy weight.  We discussed the National Weight Control Registry healthy weight maintenance strategies and ways to optimize metabolism.  We discussed the importance of physical activity including cardiovascular and strength training in maintaining a healthier weight.    Total time spent on the date of this encounter doing: chart review, review of test results, patient visit, physical exam, education, counseling, developing plan of care and documenting = 20 minutes.         JHONATAN Verma MD  [facility]           "

## 2021-07-14 ENCOUNTER — TELEPHONE (OUTPATIENT)
Dept: OBGYN | Facility: OTHER | Age: 46
End: 2021-07-14

## 2021-07-14 NOTE — TELEPHONE ENCOUNTER
Patient is calling to see if she can be worked into [facility] schedule for a cyst that needs to be drained.     Routing to [facility]'s team to call and inform patient if she can be worked in.     TONY LeeN, RN  [facility]  July 14, 2021

## 2021-07-14 NOTE — TELEPHONE ENCOUNTER
Called and spoke with pt. [facility] has an 11am opening. Offered it to pt but declined. States she has another appointment at 1030 and can't make it in. Pt scheduled with agbeh tomorrow

## 2021-09-19 ENCOUNTER — HEALTH MAINTENANCE LETTER (OUTPATIENT)
Age: 46
End: 2021-09-19

## 2022-01-24 ENCOUNTER — VIRTUAL VISIT (OUTPATIENT)
Dept: FAMILY MEDICINE | Facility: OTHER | Age: 47
End: 2022-01-24
Payer: COMMERCIAL

## 2022-01-24 DIAGNOSIS — Z20.822 SUSPECTED 2019 NOVEL CORONAVIRUS INFECTION: ICD-10-CM

## 2022-01-24 DIAGNOSIS — Z20.822 EXPOSURE TO 2019 NOVEL CORONAVIRUS: Primary | ICD-10-CM

## 2022-01-24 PROCEDURE — 99212 OFFICE O/P EST SF 10 MIN: CPT | Mod: 95 | Performed by: PHYSICIAN ASSISTANT

## 2022-01-24 RX ORDER — OLANZAPINE 5 MG/1
5 TABLET ORAL
COMMUNITY
Start: 2021-04-14 | End: 2022-11-16

## 2022-01-24 RX ORDER — FLUOXETINE 40 MG/1
1 CAPSULE ORAL DAILY
COMMUNITY
Start: 2022-01-04 | End: 2022-11-16

## 2022-01-24 RX ORDER — METOPROLOL SUCCINATE 50 MG/1
50 TABLET, EXTENDED RELEASE ORAL DAILY
COMMUNITY
Start: 2021-12-06 | End: 2022-11-16

## 2022-01-24 RX ORDER — ALPRAZOLAM 2 MG
2 TABLET ORAL DAILY
COMMUNITY
Start: 2021-04-14 | End: 2022-11-16

## 2022-01-24 RX ORDER — ROSUVASTATIN CALCIUM 10 MG/1
10 TABLET, COATED ORAL DAILY
COMMUNITY
Start: 2021-12-06

## 2022-01-24 ASSESSMENT — PAIN SCALES - GENERAL: PAINLEVEL: NO PAIN (0)

## 2022-01-24 NOTE — PROGRESS NOTES
Fabiana is a 46 year old who is being evaluated via a billable telephone visit.      What phone number would you like to be contacted at? [phone]  How would you like to obtain your AVS? MyChart    Assessment & Plan     Exposure to 2019 novel coronavirus  Suspected 2019 novel coronavirus infection  Suspect she is positive for COVID based on symptoms and timing after exposure to son who was positive despite limited exposure.  Recommended nasal steroids, nasal saline rinses, decongestants, tylenol/ibuprofen, rest, and hydration in the meantime.  Order placed for testing. Discussed isolation recommendations at this time.  Discussed symptoms to monitor for and expected course of illness.  Encouraged booster vaccination once feeling better, she is planning to.   - Symptomatic; Yes; 1/22/2022 COVID-19 Virus (Coronavirus) by PCR; Future    Return in about 1 week (around 1/31/2022) for If not improving, sooner if worse or new concerns.    Options for treatment and follow-up care were reviewed with the patient and/or guardian. Patient and/or guardian engaged in the decision making process and verbalized understanding of the options discussed and agreed with the final plan.    Barbara Zamudio PA-C  [facility]   Fabiana is a 46 year old who presents for the following health issues     HPI       Concern for COVID-19  About how many days ago did these symptoms start? 2 days   Is this your first visit for this illness? Yes - coughing and congested and then yesterday just wasn't functioning well.   In the 14 days before your symptoms started, have you had close contact with someone with COVID-19 (Coronavirus)? Yes, I have been in contact with someone who has COVID-19/Coronavirus (confirmed by lab test).- son tested positive on 1/14/2022 but he wasn't really around he was at his Dad's.    Do you have a fever or chills? Yes, I felt feverish or had chills.  Are you having new or  worsening difficulty breathing? No  Do you have new or worsening cough? Yes, I am coughing up mucus.  Have you had any new or unexplained body aches? unsure     Have you experienced any of the following NEW symptoms?    Headache: YES    Sore throat: No    Loss of taste or smell: YES    Chest pain: YES    Diarrhea: No    Rash: No  What treatments have you tried? OTC- tylenol and Robitussin DM   Who do you live with? 3 children   Are you, or a household member, a healthcare worker or a ? No  Do you live in a nursing home, group home, or shelter? No  Do you have a way to get food/medications if quarantined? Yes, I have a friend or family member who can help me.    - Robitussin DM, Acetaminophen, Mucinex.       Review of Systems   Constitutional, HEENT, cardiovascular, pulmonary, gi and gu systems are negative, except as otherwise noted.      Objective    Vitals - Patient Reported  Pain Score: No Pain (0)      Vitals:  No vitals were obtained today due to virtual visit.    Physical Exam   alert and no distress  PSYCH: Alert and oriented times 3; coherent speech, normal   rate and volume, able to articulate logical thoughts, able   to abstract reason, no tangential thoughts, no hallucinations   or delusions  Her affect is normal  RESP: No cough, no audible wheezing, able to talk in full sentences  Remainder of exam unable to be completed due to telephone visits                Phone call duration: 6:32 minutes

## 2022-01-24 NOTE — PATIENT INSTRUCTIONS
"1. Continue with Robitussin Delsym or Mucinex.     2. Nasal Saline: At the pharmacy you can find a \"Nettipot\" or NeilMed Nasal Rinse.  This is a salt solution that you mix with warm water.  I want you to perform nasal saline washes at least twice daily while you are sick.  You can do this anytime you feel like you are developing nasal congestion.  To properly utilize be sure you are leaning forward as far as you can and either tilt or squeeze slowly.  In the beginning this can be difficult to get to work properly but as the congestion is improved it will work easier.  If you don't use these properly you can feel as if you're drowning.     3. Nasal Steroid: Fluticasone/Flonase or Nasacort, These are OTC medications for allergies.  They are steroid sprays that are localized to the nose so no systemic effects. Two sprays each nostril once daily - allergist noted it is most effective if used before bed.  Ok to continue to use nasal saline as well.  When you spray it in the nose it should be up and out towards the eye on the side you are spraying the medication.  You should not taste the medication and it should not drip out the nose. This will decrease inflammation and also nasal mucous production.  You can also use this anytime you are developing nasal congestion.  Ok in the future to start these as soon as you feel you are developing nasal congestion, sinus pressure, increased nasal drainage.    4. IMPORTANT: When using other nasal inhaled products especially 12 hour sprays such as Afrin you should only use for a max of approximately 3 days, longer use could result in rebound congestion (congestion that develops because you're off the medication).    5. Heat pack on sinuses.     6. Tylenol and Ibuprofen as needed.       Follow-up if symptoms worsen or do not resolve.  Feel free to call with any questions or concerns.      "

## 2022-03-06 ENCOUNTER — HEALTH MAINTENANCE LETTER (OUTPATIENT)
Age: 47
End: 2022-03-06

## 2022-05-31 ENCOUNTER — NURSE TRIAGE (OUTPATIENT)
Dept: NURSING | Facility: CLINIC | Age: 47
End: 2022-05-31

## 2022-05-31 ENCOUNTER — VIRTUAL VISIT (OUTPATIENT)
Dept: FAMILY MEDICINE | Facility: CLINIC | Age: 47
End: 2022-05-31
Payer: COMMERCIAL

## 2022-05-31 DIAGNOSIS — L03.116 CELLULITIS OF LEFT LOWER EXTREMITY: Primary | ICD-10-CM

## 2022-05-31 DIAGNOSIS — F31.9 BIPOLAR I DISORDER (H): ICD-10-CM

## 2022-05-31 PROCEDURE — 99213 OFFICE O/P EST LOW 20 MIN: CPT | Mod: 95 | Performed by: FAMILY MEDICINE

## 2022-05-31 RX ORDER — CEPHALEXIN 500 MG/1
500 CAPSULE ORAL 4 TIMES DAILY
Qty: 40 CAPSULE | Refills: 0 | Status: SHIPPED | OUTPATIENT
Start: 2022-05-31 | End: 2022-06-10

## 2022-05-31 ASSESSMENT — PATIENT HEALTH QUESTIONNAIRE - PHQ9
SUM OF ALL RESPONSES TO PHQ QUESTIONS 1-9: 16
SUM OF ALL RESPONSES TO PHQ QUESTIONS 1-9: 16
10. IF YOU CHECKED OFF ANY PROBLEMS, HOW DIFFICULT HAVE THESE PROBLEMS MADE IT FOR YOU TO DO YOUR WORK, TAKE CARE OF THINGS AT HOME, OR GET ALONG WITH OTHER PEOPLE: EXTREMELY DIFFICULT

## 2022-05-31 NOTE — PROGRESS NOTES
Fabiana is a 46 year old who is being evaluated via a billable video visit.      How would you like to obtain your AVS? MyChart  If the video visit is dropped, the invitation should be resent by: Text to cell phone: [phone]  Will anyone else be joining your video visit? No   Pt got a Bug bite and now her Foot is swollen and red  Has pain  No fever or chills  Had a bug Bite that she kept Itching    Video Start Time: 12.19 PM    Assessment & Plan     Cellulitis of left Foot  Advised not to scratch  Keep area clean  Use Bacitracin  SEE EPIC care orders  The potential side effects of this medication have been discussed with the patient.  Call if any significant problems with these are experienced.    - cephALEXin (KEFLEX) 500 MG capsule; Take 1 capsule (500 mg) by mouth 4 times daily for 10 days    Bipolar I disorder (H)  Pt says she sees a psychiatrist and in touch with them weekly  She will not do anything to harm herself  Has Good support  Advised r see MD if any symptoms are worse  Follow up PMD 1 week recheck cellulitis        PHQ 5/31/2022   PHQ-9 Total Score 16   Q9: Thoughts of better off dead/self-harm past 2 weeks Several days   F/U: Thoughts of suicide or self-harm No   F/U: Safety concerns No     Samantha Vieira MD  [facility]   Fabiana is a 46 year old who presents for the following health issues     History of Present Illness       Reason for visit:  Infected foot  Symptom onset:  3-7 days ago  Symptom intensity:  Moderate  Symptom progression:  Staying the same  Had these symptoms before:  No    She eats 2-3 servings of fruits and vegetables daily.She consumes 1 sweetened beverage(s) daily.She exercises with enough effort to increase her heart rate 20 to 29 minutes per day.  She exercises with enough effort to increase her heart rate 3 or less days per week.   She is taking medications regularly.    Today's PHQ-9         PHQ-9 Total Score: 16    PHQ-9 Q9  Thoughts of better off dead/self-harm past 2 weeks :   Several days  Thoughts of suicide or self harm: (P) No  Self-harm Plan:     Self-harm Action:       Safety concerns for self or others: (P) No    How difficult have these problems made it for you to do your work, take care of things at home, or get along with other people: Extremely difficult   Review of Systems   Rest of the ROS is Negative except see above and Problem list [stable]        Objective           Vitals:  No vitals were obtained today due to virtual visit.    Physical Exam   GENERAL: Healthy, alert and no distress  EYES: Eyes grossly normal to inspection.  No discharge or erythema, or obvious scleral/conjunctival abnormalities.  RESP: No audible wheeze, cough, or visible cyanosis.  No visible retractions or increased work of breathing.    SKIN: Visible skin clear. No significant rash, abnormal pigmentation or lesions.  NEURO: Cranial nerves grossly intact.  Mentation and speech appropriate for age.  PSYCH: Mentation appears normal, affect normal/bright, judgement and insight intact, normal speech and appearance well-groomed.  Left Foot-dorsal aspect   erythema with a superficial laceration and surrounding erythema  Mild swelling          Video-Visit Details    Type of service:  Video Visit    Video End Time:12:25 PM    Originating Location (pt. Location): Home    Distant Location (provider location):  [facility]     Platform used for Video Visit: LiveRSVP   12:30 PM  Visit complete

## 2022-05-31 NOTE — TELEPHONE ENCOUNTER
S-(situation): infected wound, left foot    B-(background):   Had an insect bite a few days ago, scratched it and has been appearing worse the past 2 days      A-(assessment):   Red, swollen and warm scratch on left foot  Painful to touch  No fever. No SOB. No cough    R-(recommendations): virtual visit today is appropriate.  Pt scheduled an evisit, but would prefer telephone/video visit.    Transferred to , advised to cancel evisit once VV is scheduled.    She Whatley RN/[facility] Nurse Advisor          Reason for Disposition    Red or very tender (to touch) area, getting larger over 48 hours after the bite    Additional Information    Negative: Passed out (i.e., fainted, collapsed and was not responding)    Negative: Wheezing or difficulty breathing    Negative: Hoarseness, cough or tightness in the throat or chest    Negative: Swollen tongue or difficulty swallowing    Negative: Life-threatening reaction (anaphylaxis) in the past to same insect bite and < 2 hours since bite    Negative: Sounds like a life-threatening emergency to the triager    Negative: Patient sounds very sick or weak to the triager    Negative: SEVERE bite pain and not improved after 2 hours of pain medicine    Negative: Fever and area is red    Negative: Fever and area is very tender to touch    Negative: Red streak or red line and length > 2 inches (5 cm)    Negative: Red or very tender (to touch) area, and started over 24 hours after the bite    Protocols used: INSECT BITE-A-OH

## 2022-11-21 ENCOUNTER — HEALTH MAINTENANCE LETTER (OUTPATIENT)
Age: 47
End: 2022-11-21

## 2022-12-25 ENCOUNTER — HEALTH MAINTENANCE LETTER (OUTPATIENT)
Age: 47
End: 2022-12-25

## 2023-06-02 ENCOUNTER — HEALTH MAINTENANCE LETTER (OUTPATIENT)
Age: 48
End: 2023-06-02

## 2024-06-23 ENCOUNTER — HEALTH MAINTENANCE LETTER (OUTPATIENT)
Age: 49
End: 2024-06-23

## 2025-02-12 NOTE — TELEPHONE ENCOUNTER
Please submit DILEEP Oneill PA-C  [facility]      REVIEW OF CARDIOVASCULAR SYSTEMS:  Cardiovascular problem(s): Negative    Patient does not smoke.    Patient does not take aspirin.